# Patient Record
Sex: FEMALE | Race: WHITE | Employment: OTHER | URBAN - METROPOLITAN AREA
[De-identification: names, ages, dates, MRNs, and addresses within clinical notes are randomized per-mention and may not be internally consistent; named-entity substitution may affect disease eponyms.]

---

## 2017-01-06 ENCOUNTER — OFFICE VISIT (OUTPATIENT)
Dept: FAMILY MEDICINE CLINIC | Age: 82
End: 2017-01-06

## 2017-01-06 VITALS
WEIGHT: 171.4 LBS | HEIGHT: 66 IN | BODY MASS INDEX: 27.55 KG/M2 | OXYGEN SATURATION: 98 % | DIASTOLIC BLOOD PRESSURE: 80 MMHG | HEART RATE: 92 BPM | SYSTOLIC BLOOD PRESSURE: 120 MMHG

## 2017-01-06 DIAGNOSIS — Z00.00 WELL ADULT EXAM: Primary | ICD-10-CM

## 2017-01-06 DIAGNOSIS — E03.9 HYPOTHYROIDISM, UNSPECIFIED TYPE: ICD-10-CM

## 2017-01-06 DIAGNOSIS — H35.30 MACULAR DEGENERATION: ICD-10-CM

## 2017-01-06 DIAGNOSIS — R32 URINARY INCONTINENCE, UNSPECIFIED TYPE: ICD-10-CM

## 2017-01-06 DIAGNOSIS — M85.80 OSTEOPENIA: ICD-10-CM

## 2017-01-06 PROCEDURE — 3288F FALL RISK ASSESSMENT DOCD: CPT | Performed by: FAMILY MEDICINE

## 2017-01-06 PROCEDURE — 99397 PER PM REEVAL EST PAT 65+ YR: CPT | Performed by: FAMILY MEDICINE

## 2017-01-06 RX ORDER — LEVOTHYROXINE SODIUM 0.07 MG/1
75 TABLET ORAL DAILY
COMMUNITY
End: 2017-01-06 | Stop reason: SDUPTHER

## 2017-01-06 RX ORDER — DOCUSATE SODIUM 100 MG/1
100 CAPSULE, LIQUID FILLED ORAL DAILY
COMMUNITY

## 2017-01-06 RX ORDER — LEVOTHYROXINE SODIUM 0.07 MG/1
75 TABLET ORAL DAILY
Qty: 90 TABLET | Refills: 3 | Status: SHIPPED | OUTPATIENT
Start: 2017-01-06 | End: 2018-02-21 | Stop reason: SDUPTHER

## 2017-01-23 ENCOUNTER — HOSPITAL ENCOUNTER (OUTPATIENT)
Dept: OTHER | Age: 82
Discharge: HOME | End: 2017-01-23
Attending: FAMILY MEDICINE | Admitting: FAMILY MEDICINE

## 2017-01-23 LAB
A/G RATIO: 1.4 (ref 1.1–2.2)
ALBUMIN SERPL-MCNC: 3.7 G/DL (ref 3.4–5)
ALP BLD-CCNC: 72 U/L (ref 40–129)
ALT SERPL-CCNC: 11 U/L (ref 10–40)
ANION GAP SERPL CALCULATED.3IONS-SCNC: 14 MMOL/L (ref 3–16)
AST SERPL-CCNC: 18 U/L (ref 15–37)
BILIRUB SERPL-MCNC: 0.6 MG/DL (ref 0–1)
BUN BLDV-MCNC: 16 MG/DL (ref 7–20)
CALCIUM SERPL-MCNC: 9.1 MG/DL (ref 8.3–10.6)
CHLORIDE BLD-SCNC: 104 MMOL/L (ref 99–110)
CHOLESTEROL, TOTAL: 207 MG/DL (ref 0–199)
CO2: 24 MMOL/L (ref 21–32)
CREAT SERPL-MCNC: 0.8 MG/DL (ref 0.6–1.2)
GFR AFRICAN AMERICAN: >60
GFR NON-AFRICAN AMERICAN: >60
GLOBULIN: 2.7 G/DL
GLUCOSE BLD-MCNC: 86 MG/DL (ref 70–99)
HCT VFR BLD CALC: 40.6 % (ref 36–48)
HDLC SERPL-MCNC: 56 MG/DL (ref 40–60)
HEMOGLOBIN: 13.3 G/DL (ref 12–16)
LDL CHOLESTEROL CALCULATED: 129 MG/DL
MCH RBC QN AUTO: 28.9 PG (ref 26–34)
MCHC RBC AUTO-ENTMCNC: 32.7 G/DL (ref 31–36)
MCV RBC AUTO: 88.3 FL (ref 80–100)
PDW BLD-RTO: 14 % (ref 12.4–15.4)
PLATELET # BLD: 169 K/UL (ref 135–450)
PMV BLD AUTO: 9.3 FL (ref 5–10.5)
POTASSIUM SERPL-SCNC: 3.9 MMOL/L (ref 3.5–5.1)
RBC # BLD: 4.59 M/UL (ref 4–5.2)
SODIUM BLD-SCNC: 142 MMOL/L (ref 136–145)
TOTAL PROTEIN: 6.4 G/DL (ref 6.4–8.2)
TRIGL SERPL-MCNC: 108 MG/DL (ref 0–150)
TSH SERPL DL<=0.05 MIU/L-ACNC: 3.12 UIU/ML (ref 0.27–4.2)
VLDLC SERPL CALC-MCNC: 22 MG/DL
WBC # BLD: 4.4 K/UL (ref 4–11)

## 2017-06-07 ENCOUNTER — HOSPITAL ENCOUNTER (OUTPATIENT)
Dept: MAMMOGRAPHY | Age: 82
Discharge: HOME | End: 2017-06-07
Attending: OBSTETRICS & GYNECOLOGY | Admitting: OBSTETRICS & GYNECOLOGY

## 2017-06-07 DIAGNOSIS — Z12.31 VISIT FOR SCREENING MAMMOGRAM: ICD-10-CM

## 2018-02-13 ENCOUNTER — OFFICE VISIT (OUTPATIENT)
Dept: FAMILY MEDICINE CLINIC | Age: 83
End: 2018-02-13

## 2018-02-13 VITALS
DIASTOLIC BLOOD PRESSURE: 80 MMHG | TEMPERATURE: 97.6 F | HEART RATE: 108 BPM | SYSTOLIC BLOOD PRESSURE: 140 MMHG | OXYGEN SATURATION: 98 % | WEIGHT: 171.6 LBS | BODY MASS INDEX: 27.7 KG/M2

## 2018-02-13 DIAGNOSIS — J06.9 UPPER RESPIRATORY TRACT INFECTION, UNSPECIFIED TYPE: Primary | ICD-10-CM

## 2018-02-13 PROCEDURE — 99213 OFFICE O/P EST LOW 20 MIN: CPT | Performed by: PHYSICIAN ASSISTANT

## 2018-02-13 RX ORDER — CEPHALEXIN 500 MG/1
500 CAPSULE ORAL 3 TIMES DAILY
Qty: 30 CAPSULE | Refills: 0 | Status: SHIPPED | OUTPATIENT
Start: 2018-02-13 | End: 2019-02-21

## 2018-02-13 ASSESSMENT — ENCOUNTER SYMPTOMS
VOMITING: 0
TROUBLE SWALLOWING: 0
EYE PAIN: 0
COUGH: 1
WHEEZING: 0
SORE THROAT: 1
DIARRHEA: 0
RHINORRHEA: 1
SINUS PRESSURE: 0
SINUS PAIN: 0
SHORTNESS OF BREATH: 0
NAUSEA: 0

## 2018-02-13 NOTE — PROGRESS NOTES
visit:    Upper respiratory tract infection, unspecified type  -     cephALEXin (KEFLEX) 500 MG capsule; Take 1 capsule by mouth 3 times daily             Plan:      Treat symptomatically for a few more days and if not resolving, start keflex.

## 2018-02-21 ENCOUNTER — HOSPITAL ENCOUNTER (OUTPATIENT)
Dept: OTHER | Age: 83
Discharge: HOME | End: 2018-02-21
Attending: FAMILY MEDICINE | Admitting: FAMILY MEDICINE

## 2018-02-21 ENCOUNTER — OFFICE VISIT (OUTPATIENT)
Dept: FAMILY MEDICINE CLINIC | Age: 83
End: 2018-02-21

## 2018-02-21 VITALS
WEIGHT: 169.38 LBS | TEMPERATURE: 97.3 F | OXYGEN SATURATION: 98 % | DIASTOLIC BLOOD PRESSURE: 80 MMHG | HEART RATE: 78 BPM | BODY MASS INDEX: 27.22 KG/M2 | HEIGHT: 66 IN | SYSTOLIC BLOOD PRESSURE: 130 MMHG | RESPIRATION RATE: 12 BRPM

## 2018-02-21 DIAGNOSIS — J01.90 ACUTE BACTERIAL SINUSITIS: ICD-10-CM

## 2018-02-21 DIAGNOSIS — H35.30 MACULAR DEGENERATION: ICD-10-CM

## 2018-02-21 DIAGNOSIS — E03.9 HYPOTHYROIDISM, UNSPECIFIED TYPE: ICD-10-CM

## 2018-02-21 DIAGNOSIS — Z00.00 WELL ADULT EXAM: Primary | ICD-10-CM

## 2018-02-21 DIAGNOSIS — B96.89 ACUTE BACTERIAL SINUSITIS: ICD-10-CM

## 2018-02-21 LAB — TSH SERPL DL<=0.05 MIU/L-ACNC: 2.35 UIU/ML (ref 0.27–4.2)

## 2018-02-21 PROCEDURE — G0439 PPPS, SUBSEQ VISIT: HCPCS | Performed by: FAMILY MEDICINE

## 2018-02-21 RX ORDER — LEVOTHYROXINE SODIUM 0.07 MG/1
75 TABLET ORAL DAILY
Qty: 90 TABLET | Refills: 3 | Status: SHIPPED | OUTPATIENT
Start: 2018-02-21 | End: 2019-02-21 | Stop reason: SDUPTHER

## 2018-02-21 ASSESSMENT — PATIENT HEALTH QUESTIONNAIRE - PHQ9
SUM OF ALL RESPONSES TO PHQ QUESTIONS 1-9: 0
2. FEELING DOWN, DEPRESSED OR HOPELESS: 0
1. LITTLE INTEREST OR PLEASURE IN DOING THINGS: 0
SUM OF ALL RESPONSES TO PHQ9 QUESTIONS 1 & 2: 0

## 2018-07-02 ENCOUNTER — HOSPITAL ENCOUNTER (OUTPATIENT)
Dept: MAMMOGRAPHY | Age: 83
Discharge: HOME | End: 2018-07-02
Attending: FAMILY MEDICINE | Admitting: FAMILY MEDICINE

## 2018-07-02 DIAGNOSIS — Z12.39 BREAST CANCER SCREENING: ICD-10-CM

## 2019-02-21 ENCOUNTER — OFFICE VISIT (OUTPATIENT)
Dept: FAMILY MEDICINE CLINIC | Age: 84
End: 2019-02-21
Payer: COMMERCIAL

## 2019-02-21 VITALS
HEIGHT: 66 IN | OXYGEN SATURATION: 95 % | SYSTOLIC BLOOD PRESSURE: 136 MMHG | HEART RATE: 85 BPM | WEIGHT: 168.5 LBS | RESPIRATION RATE: 12 BRPM | DIASTOLIC BLOOD PRESSURE: 82 MMHG | BODY MASS INDEX: 27.08 KG/M2

## 2019-02-21 DIAGNOSIS — H35.30 MACULAR DEGENERATION OF BOTH EYES, UNSPECIFIED TYPE: ICD-10-CM

## 2019-02-21 DIAGNOSIS — M85.80 OSTEOPENIA, UNSPECIFIED LOCATION: ICD-10-CM

## 2019-02-21 DIAGNOSIS — E03.9 HYPOTHYROIDISM, UNSPECIFIED TYPE: ICD-10-CM

## 2019-02-21 DIAGNOSIS — Z00.00 WELL ADULT EXAM: Primary | ICD-10-CM

## 2019-02-21 PROCEDURE — G0439 PPPS, SUBSEQ VISIT: HCPCS | Performed by: FAMILY MEDICINE

## 2019-02-21 RX ORDER — LEVOTHYROXINE SODIUM 0.07 MG/1
75 TABLET ORAL DAILY
Qty: 90 TABLET | Refills: 3 | Status: SHIPPED | OUTPATIENT
Start: 2019-02-21 | End: 2020-02-26 | Stop reason: SDUPTHER

## 2019-02-21 ASSESSMENT — PATIENT HEALTH QUESTIONNAIRE - PHQ9
SUM OF ALL RESPONSES TO PHQ QUESTIONS 1-9: 0
2. FEELING DOWN, DEPRESSED OR HOPELESS: 0
SUM OF ALL RESPONSES TO PHQ QUESTIONS 1-9: 0
1. LITTLE INTEREST OR PLEASURE IN DOING THINGS: 0
SUM OF ALL RESPONSES TO PHQ9 QUESTIONS 1 & 2: 0

## 2019-03-02 ENCOUNTER — HOSPITAL ENCOUNTER (OUTPATIENT)
Age: 84
Discharge: HOME | End: 2019-03-02
Payer: COMMERCIAL

## 2019-03-02 LAB
A/G RATIO: 1.3 (ref 1.1–2.2)
ALBUMIN SERPL-MCNC: 3.9 G/DL (ref 3.4–5)
ALP BLD-CCNC: 77 U/L (ref 40–129)
ALT SERPL-CCNC: 15 U/L (ref 10–40)
ANION GAP SERPL CALCULATED.3IONS-SCNC: 12 MMOL/L (ref 3–16)
AST SERPL-CCNC: 21 U/L (ref 15–37)
BILIRUB SERPL-MCNC: 0.6 MG/DL (ref 0–1)
BUN BLDV-MCNC: 13 MG/DL (ref 7–20)
CALCIUM SERPL-MCNC: 9.3 MG/DL (ref 8.3–10.6)
CHLORIDE BLD-SCNC: 105 MMOL/L (ref 99–110)
CHOLESTEROL, TOTAL: 220 MG/DL (ref 0–199)
CO2: 26 MMOL/L (ref 21–32)
CREAT SERPL-MCNC: 0.7 MG/DL (ref 0.6–1.2)
GFR AFRICAN AMERICAN: >60
GFR NON-AFRICAN AMERICAN: >60
GLOBULIN: 3 G/DL
GLUCOSE BLD-MCNC: 99 MG/DL (ref 70–99)
HDLC SERPL-MCNC: 59 MG/DL (ref 40–60)
LDL CHOLESTEROL CALCULATED: 139 MG/DL
POTASSIUM SERPL-SCNC: 4.6 MMOL/L (ref 3.5–5.1)
SODIUM BLD-SCNC: 143 MMOL/L (ref 136–145)
TOTAL PROTEIN: 6.9 G/DL (ref 6.4–8.2)
TRIGL SERPL-MCNC: 112 MG/DL (ref 0–150)
TSH SERPL DL<=0.05 MIU/L-ACNC: 3.05 UIU/ML (ref 0.27–4.2)
VLDLC SERPL CALC-MCNC: 22 MG/DL

## 2019-03-02 PROCEDURE — 80061 LIPID PANEL: CPT

## 2019-03-02 PROCEDURE — 80053 COMPREHEN METABOLIC PANEL: CPT

## 2019-03-02 PROCEDURE — 36415 COLL VENOUS BLD VENIPUNCTURE: CPT

## 2019-03-02 PROCEDURE — 84443 ASSAY THYROID STIM HORMONE: CPT

## 2019-03-04 ENCOUNTER — TELEPHONE (OUTPATIENT)
Dept: FAMILY MEDICINE CLINIC | Age: 84
End: 2019-03-04

## 2019-08-19 ENCOUNTER — HOSPITAL ENCOUNTER (OUTPATIENT)
Dept: WOMENS IMAGING | Age: 84
Discharge: HOME | End: 2019-08-19
Payer: COMMERCIAL

## 2019-08-19 DIAGNOSIS — Z12.31 BREAST CANCER SCREENING BY MAMMOGRAM: ICD-10-CM

## 2019-08-19 PROCEDURE — 77067 SCR MAMMO BI INCL CAD: CPT

## 2020-02-26 RX ORDER — LEVOTHYROXINE SODIUM 0.07 MG/1
75 TABLET ORAL DAILY
Qty: 90 TABLET | Refills: 0 | Status: SHIPPED | OUTPATIENT
Start: 2020-02-26 | End: 2020-03-11 | Stop reason: SDUPTHER

## 2020-03-11 ENCOUNTER — OFFICE VISIT (OUTPATIENT)
Dept: FAMILY MEDICINE CLINIC | Age: 85
End: 2020-03-11
Payer: COMMERCIAL

## 2020-03-11 VITALS
HEIGHT: 66 IN | WEIGHT: 159.6 LBS | BODY MASS INDEX: 25.65 KG/M2 | DIASTOLIC BLOOD PRESSURE: 70 MMHG | OXYGEN SATURATION: 97 % | SYSTOLIC BLOOD PRESSURE: 124 MMHG | HEART RATE: 76 BPM

## 2020-03-11 PROCEDURE — G0439 PPPS, SUBSEQ VISIT: HCPCS | Performed by: FAMILY MEDICINE

## 2020-03-11 PROCEDURE — 99214 OFFICE O/P EST MOD 30 MIN: CPT | Performed by: FAMILY MEDICINE

## 2020-03-11 RX ORDER — BUSPIRONE HYDROCHLORIDE 5 MG/1
5 TABLET ORAL 3 TIMES DAILY PRN
Qty: 90 TABLET | Refills: 2 | Status: SHIPPED | OUTPATIENT
Start: 2020-03-11 | End: 2021-01-08

## 2020-03-11 RX ORDER — LEVOTHYROXINE SODIUM 0.07 MG/1
75 TABLET ORAL DAILY
Qty: 90 TABLET | Refills: 3 | Status: SHIPPED | OUTPATIENT
Start: 2020-03-11 | End: 2020-03-20 | Stop reason: SDUPTHER

## 2020-03-11 ASSESSMENT — PATIENT HEALTH QUESTIONNAIRE - PHQ9
1. LITTLE INTEREST OR PLEASURE IN DOING THINGS: 0
2. FEELING DOWN, DEPRESSED OR HOPELESS: 1
SUM OF ALL RESPONSES TO PHQ QUESTIONS 1-9: 1
SUM OF ALL RESPONSES TO PHQ QUESTIONS 1-9: 1
SUM OF ALL RESPONSES TO PHQ9 QUESTIONS 1 & 2: 1

## 2020-03-11 ASSESSMENT — LIFESTYLE VARIABLES: HOW OFTEN DO YOU HAVE A DRINK CONTAINING ALCOHOL: 0

## 2020-03-11 NOTE — PROGRESS NOTES
Medicare Annual Wellness Visit  Name: Sandra Farrell Date: 3/11/2020   MRN: [id number] Sex: Female   Age: 82 y.o. Ethnicity: Non-/Non    : 1935 Race: Chelle Gloria is here for Medicare AWV      CC: Patient resents for annual Medicare visit and follow-up of chronic health problems. S: Patient states she just has a lot of stress in her life at this time. Her sister has had progressive dementia and is requiring a lot of assistance on a daily basis. She states her and her brother were both sharing his responsibilities door brother's health decline recently. Patient recently has talked her brother and they are going to decide in the near future how to handle her sister. She herself has not been out with friends and eating as she has been in the past.  She is lost weight over the last year. She is requesting medication to use on a as needed basis for anxiety. She does continue her ophthalmology care for macular degeneration. Screenings for behavioral, psychosocial and functional/safety risks, and cognitive dysfunction are all negative except as indicated below. These results, as well as other patient data from the [facility] form, are documented in Flowsheets linked to this Encounter. No Known Allergies    Prior to Visit Medications    Medication Sig Taking?  Authorizing Provider   levothyroxine (SYNTHROID) 75 MCG tablet Take 1 tablet by mouth Daily Yes Chito Ward MD   aspirin 81 MG tablet Take 81 mg by mouth daily Yes Historical Provider, MD   docusate sodium (DULCOLAX) 100 MG capsule Take 100 mg by mouth daily Yes Historical Provider, MD   Ibuprofen-diphenhydrAMINE HCl (ADVIL PM) 200-25 MG CAPS Take 1 tablet by mouth nightly  Yes Historical Provider, MD       Past Medical History:   Diagnosis Date    Adhesive capsulitis of shoulder     Hypothyroidism     Osteoporosis, unspecified     Routine general medical examination at a health care facility normal bowel sounds, no masses or organomegaly  Extremities: no edema  Musculoskeletal: normal range of motion, no joint swelling, deformity or tenderness  Neurologic: no cranial nerve deficit, gait and coordination normal and speech normal    Patient's complete Health Risk Assessment and screening values have been reviewed and are found in Flowsheets. The following problems were reviewed today and where indicated follow up appointments were made and/or referrals ordered. Positive Risk Factor Screenings with Interventions:     General Health:  General  In general, how would you say your health is?: Very Good  In the past 7 days, have you experienced any of the following? New or Increased Pain, New or Increased Fatigue, Loneliness, Social Isolation, Stress or Anger?: (!) Stress  Do you get the social and emotional support that you need?: Yes  Do you have a Living Will?: (!) No  General Health Risk Interventions:  · Stress: patient's comments regarding reasons for stress and/or anger: Involving her sister with dementia. Patient already has a plan in place in conjunction with her brother to deal with this problem. Health Habits/Nutrition:  Health Habits/Nutrition  Do you exercise for at least 20 minutes 2-3 times per week?: Yes  Have you lost any weight without trying in the past 3 months?: (!) Yes  Do you eat fewer than 2 meals per day?: No  Have you seen a dentist within the past year?: Yes  Body mass index is 25.76 kg/m².   Health Habits/Nutrition Interventions:  · Nutritional issues:  nutritional supplements (Ensure) recommended to help prevent further weight loss    Safety:  Safety  Do you have working smoke detectors?: (!) No  Have all throw rugs been removed or fastened?: Yes  Do you have non-slip mats or surfaces in all bathtubs/showers?: Yes  Do all of your stairways have a railing or banister?: Yes  Are your doorways, halls and stairs free of clutter?: Yes  Do you always fasten your seatbelt when you are in a car?: (!) No(most of the time, sometimes no)  Safety Interventions:  · Home safety tips provided  · Patient declines any further evaluation/treatment for this issue    Personalized Preventive Plan   Current Health Maintenance Status  Immunization History   Administered Date(s) Administered    Influenza Vaccine, unspecified formulation 10/10/2016    Influenza Virus Vaccine 10/24/2007, 10/03/2014, 10/10/2016    Influenza Whole 10/24/2007, 10/03/2014    Influenza, High Dose (Fluzone 65 yrs and older) 09/29/2015, 09/21/2017, 09/21/2018, 10/16/2018, 09/24/2019    Influenza, Quadv, IM, (6 mo and older Fluzone, Flulaval, Fluarix and 3 yrs and older Afluria) 09/29/2015    PPD Test 10/24/2007    Pneumococcal Conjugate 13-valent (Prphokc80) 11/09/2016    Pneumococcal Polysaccharide ([id number]) 10/08/2009    Tdap (Boostrix, Adacel) 11/09/2016        Health Maintenance   Topic Date Due    Annual Wellness Visit (AWV)  05/29/2019    TSH testing  03/02/2020    Shingles Vaccine (1 of 2) 03/11/2021 (Originally 12/28/1987)    DTaP/Tdap/Td vaccine (2 - Td) 11/09/2026    DEXA (modify frequency per FRAX score)  Completed    Flu vaccine  Completed    Pneumococcal 65+ years Vaccine  Completed    Hepatitis A vaccine  Aged Out    Hepatitis B vaccine  Aged Out    Hib vaccine  Aged Out    Meningococcal (ACWY) vaccine  Aged Out     Recommendations for [employer] Due: see orders and patient instructions/AVS.  . Recommended screening schedule for the next 5-10 years is provided to the patient in written form: see Patient Chance Hopson was seen today for medicare awv. Diagnoses and all orders for this visit:    Routine general medical examination at a health care facility  -     Comprehensive Metabolic Panel; Future  -     Lipid Panel; Future    Hypothyroidism, unspecified type  -     TSH without Reflex; Future    Hypercholesteremia  -     Comprehensive Metabolic Panel;  Future  -     Lipid Panel; Future    Anxiety    Macular degeneration of both eyes, unspecified type    Other orders  -     levothyroxine (SYNTHROID) 75 MCG tablet; Take 1 tablet by mouth Daily  -     busPIRone (BUSPAR) 5 MG tablet; Take 1 tablet by mouth 3 times daily as needed (anxiety)        Pt appears stable & labs ordered. Adjustments of medication will be done after laboratory testing results available. In regards to her situational stress patient was prescribed BuSpar medication use on a as needed basis. I certainly encourage her to discuss with her brother long-term care plans in regards to her sister. Nutritionally she needs to start eating better and certainly encourage her to drink protein supplements. Continue with ophthalmology care for macular degeneration    Patient received counseling on the following healthy behaviors: nutrition and exercise     Patient given educational materials     Health maintenance updated    Discussed use, benefit, and side effects of prescribed medications. Barriers to medication compliance addressed. All patient questions answered. Pt voiced understanding. Patient needs RTC in one year for annual Medicare visit and RTC in 3 months if needed for the anxiety symptoms. Please note that this chart was generated using Dragon dictation software. Although every effort was made to ensure the accuracy of this automated transcription, some errors in transcription may have occurred.

## 2020-03-11 NOTE — PATIENT INSTRUCTIONS
Personalized Preventive Plan for Kalen Fail - 3/11/2020  Medicare offers a range of preventive health benefits. Some of the tests and screenings are paid in full while other may be subject to a deductible, co-insurance, and/or copay. Some of these benefits include a comprehensive review of your medical history including lifestyle, illnesses that may run in your family, and various assessments and screenings as appropriate. After reviewing your medical record and screening and assessments performed today your provider may have ordered immunizations, labs, imaging, and/or referrals for you. A list of these orders (if applicable) as well as your Preventive Care list are included within your After Visit Summary for your review. Other Preventive Recommendations:    · A preventive eye exam performed by an eye specialist is recommended every 1-2 years to screen for glaucoma; cataracts, macular degeneration, and other eye disorders. · A preventive dental visit is recommended every 6 months. · Try to get at least 150 minutes of exercise per week or 10,000 steps per day on a pedometer . · Order or download the FREE \"Exercise & Physical Activity: Your Everyday Guide\" from [employer]. Call 4-559.910.8276 or search [employer] online. · You need 2021-5573 mg of calcium and 2093-0368 IU of vitamin D per day. It is possible to meet your calcium requirement with diet alone, but a vitamin D supplement is usually necessary to meet this goal.  · When exposed to the sun, use a sunscreen that protects against both UVA and UVB radiation with an SPF of 30 or greater. Reapply every 2 to 3 hours or after sweating, drying off with a towel, or swimming. · Always wear a seat belt when traveling in a car. Always wear a helmet when riding a bicycle or motorcycle.

## 2020-03-13 PROBLEM — F41.9 ANXIETY: Status: ACTIVE | Noted: 2020-03-13

## 2020-03-13 PROBLEM — E78.00 HYPERCHOLESTEREMIA: Status: ACTIVE | Noted: 2020-03-13

## 2020-03-20 ENCOUNTER — HOSPITAL ENCOUNTER (OUTPATIENT)
Age: 85
Discharge: HOME | End: 2020-03-20
Payer: COMMERCIAL

## 2020-03-20 LAB
A/G RATIO: 1.5 (ref 1.1–2.2)
ALBUMIN SERPL-MCNC: 4 G/DL (ref 3.4–5)
ALP BLD-CCNC: 70 U/L (ref 40–129)
ALT SERPL-CCNC: 12 U/L (ref 10–40)
ANION GAP SERPL CALCULATED.3IONS-SCNC: 13 MMOL/L (ref 3–16)
AST SERPL-CCNC: 16 U/L (ref 15–37)
BILIRUB SERPL-MCNC: 0.7 MG/DL (ref 0–1)
BUN BLDV-MCNC: 15 MG/DL (ref 7–20)
CALCIUM SERPL-MCNC: 9.4 MG/DL (ref 8.3–10.6)
CHLORIDE BLD-SCNC: 107 MMOL/L (ref 99–110)
CHOLESTEROL, TOTAL: 208 MG/DL (ref 0–199)
CO2: 24 MMOL/L (ref 21–32)
CREAT SERPL-MCNC: 0.8 MG/DL (ref 0.6–1.2)
GFR AFRICAN AMERICAN: >60
GFR NON-AFRICAN AMERICAN: >60
GLOBULIN: 2.7 G/DL
GLUCOSE BLD-MCNC: 96 MG/DL (ref 70–99)
HDLC SERPL-MCNC: 59 MG/DL (ref 40–60)
LDL CHOLESTEROL CALCULATED: 127 MG/DL
POTASSIUM SERPL-SCNC: 4.1 MMOL/L (ref 3.5–5.1)
SODIUM BLD-SCNC: 144 MMOL/L (ref 136–145)
TOTAL PROTEIN: 6.7 G/DL (ref 6.4–8.2)
TRIGL SERPL-MCNC: 108 MG/DL (ref 0–150)
TSH SERPL DL<=0.05 MIU/L-ACNC: 4.34 UIU/ML (ref 0.27–4.2)
VLDLC SERPL CALC-MCNC: 22 MG/DL

## 2020-03-20 PROCEDURE — 84443 ASSAY THYROID STIM HORMONE: CPT

## 2020-03-20 PROCEDURE — 36415 COLL VENOUS BLD VENIPUNCTURE: CPT

## 2020-03-20 PROCEDURE — 80061 LIPID PANEL: CPT

## 2020-03-20 PROCEDURE — 80053 COMPREHEN METABOLIC PANEL: CPT

## 2020-03-20 RX ORDER — LEVOTHYROXINE SODIUM 0.1 MG/1
100 TABLET ORAL DAILY
Qty: 90 TABLET | Refills: 0 | Status: SHIPPED | OUTPATIENT
Start: 2020-03-20 | End: 2020-06-23 | Stop reason: SDUPTHER

## 2020-03-20 NOTE — TELEPHONE ENCOUNTER
----- Message from Luciana Barry MD sent at 3/20/2020 12:54 PM EDT -----  Increase thyroid medication to 100 mcg daily.   Recheck TSH in the next 2 months

## 2020-06-19 ENCOUNTER — HOSPITAL ENCOUNTER (OUTPATIENT)
Age: 85
Discharge: HOME | End: 2020-06-19
Payer: COMMERCIAL

## 2020-06-19 LAB — TSH SERPL DL<=0.05 MIU/L-ACNC: 1.27 UIU/ML (ref 0.27–4.2)

## 2020-06-19 PROCEDURE — 84443 ASSAY THYROID STIM HORMONE: CPT

## 2020-06-19 PROCEDURE — 36415 COLL VENOUS BLD VENIPUNCTURE: CPT

## 2020-06-23 RX ORDER — LEVOTHYROXINE SODIUM 0.1 MG/1
100 TABLET ORAL DAILY
Qty: 90 TABLET | Refills: 2 | Status: SHIPPED | OUTPATIENT
Start: 2020-06-23 | End: 2021-03-12

## 2020-06-23 NOTE — TELEPHONE ENCOUNTER
levothyroxine (SYNTHROID) 100 MCG tablet 90 tablet 0 3/20/2020     Pharmacy:  [facility] [address], [address]  [phone] - F [phone]

## 2020-09-15 ENCOUNTER — APPOINTMENT (OUTPATIENT)
Dept: GENERAL RADIOLOGY | Age: 85
End: 2020-09-15
Payer: COMMERCIAL

## 2020-09-15 ENCOUNTER — NURSE TRIAGE (OUTPATIENT)
Dept: OTHER | Facility: CLINIC | Age: 85
End: 2020-09-15

## 2020-09-15 ENCOUNTER — HOSPITAL ENCOUNTER (EMERGENCY)
Age: 85
Discharge: HOME | End: 2020-09-15
Payer: COMMERCIAL

## 2020-09-15 ENCOUNTER — APPOINTMENT (OUTPATIENT)
Dept: CT IMAGING | Age: 85
End: 2020-09-15
Payer: COMMERCIAL

## 2020-09-15 VITALS
RESPIRATION RATE: 21 BRPM | SYSTOLIC BLOOD PRESSURE: 149 MMHG | DIASTOLIC BLOOD PRESSURE: 52 MMHG | OXYGEN SATURATION: 96 % | TEMPERATURE: 98 F | WEIGHT: 160 LBS | HEART RATE: 64 BPM | HEIGHT: 67 IN | BODY MASS INDEX: 25.11 KG/M2

## 2020-09-15 PROCEDURE — 73030 X-RAY EXAM OF SHOULDER: CPT

## 2020-09-15 PROCEDURE — 99284 EMERGENCY DEPT VISIT MOD MDM: CPT

## 2020-09-15 PROCEDURE — 72125 CT NECK SPINE W/O DYE: CPT

## 2020-09-15 PROCEDURE — 6370000000 HC RX 637 (ALT 250 FOR IP)

## 2020-09-15 PROCEDURE — 70450 CT HEAD/BRAIN W/O DYE: CPT

## 2020-09-15 PROCEDURE — 6360000002 HC RX W HCPCS: Performed by: NURSE PRACTITIONER

## 2020-09-15 PROCEDURE — 12011 RPR F/E/E/N/L/M 2.5 CM/<: CPT

## 2020-09-15 PROCEDURE — 90715 TDAP VACCINE 7 YRS/> IM: CPT | Performed by: NURSE PRACTITIONER

## 2020-09-15 PROCEDURE — 90471 IMMUNIZATION ADMIN: CPT | Performed by: NURSE PRACTITIONER

## 2020-09-15 RX ADMIN — Medication 3 ML: at 13:18

## 2020-09-15 RX ADMIN — TETANUS TOXOID, REDUCED DIPHTHERIA TOXOID AND ACELLULAR PERTUSSIS VACCINE, ADSORBED 0.5 ML: 5; 2.5; 8; 8; 2.5 SUSPENSION INTRAMUSCULAR at 12:41

## 2020-09-15 ASSESSMENT — ENCOUNTER SYMPTOMS
CONSTIPATION: 0
VOMITING: 0
ABDOMINAL PAIN: 0
NAUSEA: 0
DIARRHEA: 0
RHINORRHEA: 0
SHORTNESS OF BREATH: 0
BLOOD IN STOOL: 0
SORE THROAT: 0

## 2020-09-15 NOTE — TELEPHONE ENCOUNTER
Reason for Disposition   Can't remember what happened (amnesia)    Answer Assessment - Initial Assessment Questions  1. MECHANISM: \"How did the injury happen? \" For falls, ask: \"What height did you fall from? \" and \"What surface did you fall against?\"       Pt fell down 8 steps   2. ONSET: \"When did the injury happen? \" (Minutes or hours ago)       7 am  3. NEUROLOGIC SYMPTOMS: \"Was there any loss of consciousness? \" \"Are there any other neurological symptoms? \"       *No Answer*  4. MENTAL STATUS: \"Does the person know who he is, who you are, and where he is? \"       *No Answer*  5. LOCATION: \"What part of the head was hit? \"       *No Answer*  6. SCALP APPEARANCE: \"What does the scalp look like? Is it bleeding now? \" If so, ask: \"Is it difficult to stop? \"       *No Answer*  7. SIZE: For cuts, bruises, or swelling, ask: \"How large is it? \" (e.g., inches or centimeters)       *No Answer*  8. PAIN: \"Is there any pain? \" If so, ask: \"How bad is it? \"  (e.g., Scale 1-10; or mild, moderate, severe)      *No Answer*  9. TETANUS: For any breaks in the skin, ask: \"When was the last tetanus booster? \"      *No Answer*  10. OTHER SYMPTOMS: \"Do you have any other symptoms? \" (e.g., neck pain, vomiting)        *No Answer*  11. PREGNANCY: \"Is there any chance you are pregnant? \" \"When was your last menstrual period? \"        *No Answer*    Protocols used: HEAD INJURY-ADULT-[place]    Advised pt to go to the ER for treatment and stitches.  Pt fell down 8 steps, pt has goose eye on cheek and laceration behind ear

## 2020-09-15 NOTE — ED PROVIDER NOTES
[address] [facility]        Pt Name: Warren Escobar  MRN: [id number]  [place] 1935  Date of evaluation: 9/15/2020  Provider: MARSHALL Hill - [place]  PCP: Laura Briscoe MD    This patient was not seen and evaluated by the attending physician No att. providers found. CHIEF COMPLAINT       Chief Complaint   Patient presents with    Fall     Pt. comes in today after she fell down the stairs. Pt. reports that she was carrying a box of cereal up the stairs and thinks that it was 6-8 steps. Pt. reports she is on a baby aspirin. HISTORY OF PRESENT ILLNESS   (Location/Symptom, Timing/Onset,Context/Setting, Quality, Duration, Modifying Factors, Severity)  Note limiting factors. Warren Escobar is a 82 y.o. female who presents emergency department with concern for evaluation after a fall. Patient fell down about 8 steps and has a contusion noted to the right orbit. No LOC. She reports that it was a mechanical fall. She is also concerned about a laceration around her right ear. She denies fever, rash, headaches, dizziness, chest pain, shortness of breath, cough, congestion, abdominal pain, nausea, vomiting, diarrhea, constipation, blood in the stool, or painful urination. No family at bedside. Nursing Notes triage note reviewed and agreed with or any disagreements were addressed  in the HPI. REVIEW OF SYSTEMS    (2-9 systems for level 4, 10 or more for level 5)     Review of Systems   Constitutional: Negative for chills and fever. HENT: Negative for postnasal drip, rhinorrhea and sore throat. Eyes: Negative for visual disturbance. Respiratory: Negative for shortness of breath. Cardiovascular: Negative for chest pain. Gastrointestinal: Negative for abdominal pain, blood in stool, constipation, diarrhea, nausea and vomiting. Genitourinary: Negative for dysuria, flank pain and hematuria.    Skin: Positive for wound. Negative for rash. Neurological: Negative for weakness and headaches. All other systems reviewed and are negative. Positives and Pertinent negatives as per HPI. Except as noted above in the ROS, all other systems were reviewed and negative. PAST MEDICAL HISTORY     Past Medical History:   Diagnosis Date    Adhesive capsulitis of shoulder     Hypothyroidism     Osteoporosis, unspecified     Routine general medical examination at a health care facility     Seborrheic dermatitis, unspecified     Unspecified disorder of skin and subcutaneous tissue          SURGICAL HISTORY       Past Surgical History:   Procedure Laterality Date    CATARACT REMOVAL      left    CATARACT REMOVAL      right    COLONOSCOPY      OTHER SURGICAL HISTORY      anal fissure         CURRENT MEDICATIONS       Previous Medications    ASPIRIN 81 MG TABLET    Take 81 mg by mouth daily    BUSPIRONE (BUSPAR) 5 MG TABLET    Take 1 tablet by mouth 3 times daily as needed (anxiety)    DOCUSATE SODIUM (DULCOLAX) 100 MG CAPSULE    Take 100 mg by mouth daily    IBUPROFEN-DIPHENHYDRAMINE HCL (ADVIL PM) 200-25 MG CAPS    Take 1 tablet by mouth nightly     LEVOTHYROXINE (SYNTHROID) 100 MCG TABLET    Take 1 tablet by mouth Daily         ALLERGIES     Patient has no known allergies. FAMILY HISTORY       Family History   Problem Relation Age of Onset    Hypertension Sister     Diabetes Sister     Stroke Father 76    High Blood Pressure Father     Heart Disease Mother [de-identified]    Cancer Paternal Aunt         breast          SOCIAL HISTORY       Social History     Socioeconomic History    Marital status:       Spouse name: None    Number of children: None    Years of education: None    Highest education level: None   Occupational History    None   Social Needs    Financial resource strain: None    Food insecurity     Worry: None     Inability: None    Transportation needs     Medical: None     Non-medical: None Tobacco Use    Smoking status: Never Smoker    Smokeless tobacco: Never Used   Substance and Sexual Activity    Alcohol use: Yes     Comment: occasional    Drug use: No    Sexual activity: None   Lifestyle    Physical activity     Days per week: None     Minutes per session: None    Stress: None   Relationships    Social connections     Talks on phone: None     Gets together: None     Attends Anabaptism service: None     Active member of club or organization: None     Attends meetings of clubs or organizations: None     Relationship status: None    Intimate partner violence     Fear of current or ex partner: None     Emotionally abused: None     Physically abused: None     Forced sexual activity: None   Other Topics Concern    None   Social History Narrative    None       SCREENINGS             PHYSICAL EXAM  (up to 7 for level 4, 8 or more for level 5)     ED Triage Vitals [09/15/20 1144]   BP Temp Temp Source Pulse Resp SpO2 Height Weight   (!) 179/84 98 °F (36.7 °C) Oral 88 18 96 % 5' 7\" (1.702 m) 160 lb (72.6 kg)       Physical Exam  Vitals signs and nursing note reviewed. Constitutional:       Appearance: She is well-developed. She is not diaphoretic. HENT:      Head: Normocephalic. Contusion and laceration present. Eyes:      General: No scleral icterus. Right eye: No discharge. Left eye: No discharge. Extraocular Movements: Extraocular movements intact. Right eye: Normal extraocular motion and no nystagmus. Left eye: Normal extraocular motion and no nystagmus. Pupils: Pupils are equal, round, and reactive to light. Neck:      Musculoskeletal: Full passive range of motion without pain, normal range of motion and neck supple. Normal range of motion. No neck rigidity. Meningeal: Brudzinski's sign and Kernig's sign absent. Cardiovascular:      Rate and Rhythm: Normal rate and regular rhythm. Heart sounds: Normal heart sounds. No murmur.  No friction rub. No gallop. Pulmonary:      Effort: Pulmonary effort is normal. No respiratory distress. Breath sounds: Normal breath sounds. No stridor. No wheezing or rales. Chest:      Chest wall: No tenderness. Abdominal:      General: Bowel sounds are normal. There is no distension. Palpations: Abdomen is soft. There is no mass. Tenderness: There is no abdominal tenderness. There is no guarding or rebound. Musculoskeletal: Normal range of motion. General: No tenderness. Right hand: Normal sensation noted. Normal strength noted. Left hand: Normal sensation noted. Normal strength noted. Skin:     General: Skin is warm and dry. Coloration: Skin is not pale. Neurological:      General: No focal deficit present. Mental Status: She is alert and oriented to person, place, and time. She is not disoriented. GCS: GCS eye subscore is 4. GCS verbal subscore is 5. GCS motor subscore is 6. Cranial Nerves: Cranial nerves are intact. Sensory: No sensory deficit. Motor: Motor function is intact. No abnormal muscle tone. Coordination: Coordination is intact. Coordination normal.      Deep Tendon Reflexes: Reflexes are normal and symmetric. Psychiatric:         Behavior: Behavior normal.         DIAGNOSTIC RESULTS   LABS:    Labs Reviewed - No data to display    All other labs werewithin normal range or not returned as of this dictation. EKG: All EKG's are interpreted by the Emergency Department Physician who either signs or Co-signs this chart in the absence of acardiologist.  Please see their note for interpretation of EKG. RADIOLOGY:   Interpretation per the Radiologist below, if available at the time of this note:    CT Cervical Spine WO Contrast   Final Result   No acute abnormality of the cervical spine. CT Head WO Contrast   Final Result   1. No acute intracranial abnormality.          XR SHOULDER RIGHT (MIN 2 VIEWS)   Final Result   No acute findings           No results found. PROCEDURES   Unless otherwise noted below, none     Lac Repair    Date/Time: 9/15/2020 1:53 PM  Performed by: MARSHALL Alfredo CNP  Authorized by: MARSHALL Alfredo CNP     Consent:     Consent obtained:  Verbal    Consent given by:  Patient    Risks discussed:  Infection, need for additional repair, nerve damage, poor wound healing, poor cosmetic result, pain, retained foreign body, tendon damage and vascular damage    Alternatives discussed:  No treatment and referral  Anesthesia (see MAR for exact dosages): Anesthesia method:  Topical application    Topical anesthetic:  LET  Laceration details:     Location:  Ear    Ear location:  R ear    Length (cm):  1  Repair type:     Repair type:  Complex  Pre-procedure details:     Preparation:  Imaging obtained to evaluate for foreign bodies  Exploration:     Hemostasis achieved with:  Direct pressure and LET    Wound exploration: entire depth of wound probed and visualized      Wound extent: areolar tissue violated      Wound extent: no fascia violation noted, no foreign bodies/material noted, no muscle damage noted, no nerve damage noted, no tendon damage noted, no underlying fracture noted and no vascular damage noted    Treatment:     Area cleansed with:  Hibiclens    Amount of cleaning:  Standard    Irrigation solution:  Sterile water  Skin repair:     Repair method:  Sutures    Suture size:  4-0    Suture material:  Nylon    Suture technique:  Simple interrupted    Number of sutures:  1  Approximation:     Approximation:  Close  Post-procedure details:     Dressing:  Open (no dressing)    Patient tolerance of procedure: Tolerated well, no immediate complications        CRITICAL CARE TIME     There was a high probability of life-threatening clinical deterioration in the patient's condition requiring my urgent intervention.  The total critical care time spent while evaluating and treating this patient was at least 0 minutes. This excludes time spent doing separately billable procedures. This includes time at the bedside, data interpretation, medication management, obtaining critical history from collateral sources if the patient is unable to provide it directly, and physician consultation. Specifics of interventions taken and potentially life-threatening diagnostic considerations are listed in the medical decision making. CONSULTS:  None      EMERGENCY DEPARTMENT COURSE and DIFFERENTIAL DIAGNOSIS/MDM:   Vitals:    Vitals:    09/15/20 1310 09/15/20 1330 09/15/20 1400 09/15/20 1430   BP: (!) 157/53 139/69 (!) 149/52    Pulse: 63 62 63 64   Resp: 25 22 20 21   Temp:       TempSrc:       SpO2: 99% 98% 97% 96%   Weight:       Height:           Jake Gonzalez was given the following medications:  Medications   Tetanus-Diphth-Acell Pertussis (BOOSTRIX) injection 0.5 mL (0.5 mLs Intramuscular Given 9/15/20 1241)   lidocaine-EPINEPHrine-tetracaine (LET) topical solution 3 mL syringe (3 mLs Topical Given 9/15/20 1318)       Jake Gonzalez was evaluated in the emergency department with concern for evaluation after a fall. She did fall down a couple steps and did not injure her head. No LOC. CT imaging of the head and cervical spine are negative. Laceration noted to the right ear. Repaired per the procedure note above. She tolerated this well with no immediate complications. Neurovascularly intact distal to the injury. This lowers my suspicion for nerve or vascular injury. No fracture or bony abnormality noted. In addition there is full range of motion and sensation with no tendon injury noted. I estimate there is LOW risk for COMPARTMENT SYNDROME, TENDON OR NEUROVASCULAR INJURY, OR FOREIGN BODY, thus I consider the discharge disposition reasonable. Also, there is no evidence or peritonitis, sepsis, or toxicity. Wound care instructions were provided.   My suspicion is low for serious injury including fracture, dislocation, compartment syndrome, or intracranial hemorrhage. The injury sounds consistent with mechanical fall. My suspicion is low for syncopy, arrhythmia, TIA, stroke, seizure, abuse, or PE. Merlin All is stable in the ER and safe to follow as an outpatient. The patient is discharged on the following medications. They were counseled on how to take the newly prescribed medications:  New Prescriptions    No medications on file    . Instructed to follow-up with: MD Barbara Brothers  [phone]    Schedule an appointment as soon as possible for a visit in 1 week  For a recheck, For suture removal in 7-10 days    Return to the ER for new or worsening symptoms. I evaluated the patient. The physician was available for consultation as needed. The patient and / or the family were informed of the results of any tests, a time was given to answer questions, a plan was proposed and they agreed with plan. FINAL IMPRESSION      1. Closed head injury, initial encounter    2. Fall on steps, initial encounter    3. Complex laceration of right ear, initial encounter    4.  Contusion of right orbit, initial encounter          DISPOSITION/PLAN   DISPOSITION Decision To Discharge 09/15/2020 02:20:03 PM        DISCONTINUED MEDICATIONS:  Discontinued Medications    No medications on file                (Please note that portions of this note were completed with a voice recognition program.  Efforts were made to edit the dictations but occasionally words are mis-transcribed.)    Cecille Lefort, APRN - CNP (electronically signed)        Cecille Lefort, APRN - CNP  09/15/20 MARSHALL Jimenez CNP  10/06/20 1143

## 2020-09-21 ENCOUNTER — OFFICE VISIT (OUTPATIENT)
Dept: FAMILY MEDICINE CLINIC | Age: 85
End: 2020-09-21
Payer: COMMERCIAL

## 2020-09-21 VITALS
OXYGEN SATURATION: 98 % | RESPIRATION RATE: 12 BRPM | BODY MASS INDEX: 26.02 KG/M2 | SYSTOLIC BLOOD PRESSURE: 126 MMHG | TEMPERATURE: 97.1 F | HEART RATE: 79 BPM | DIASTOLIC BLOOD PRESSURE: 70 MMHG | WEIGHT: 166.13 LBS

## 2020-09-21 PROCEDURE — 99214 OFFICE O/P EST MOD 30 MIN: CPT | Performed by: FAMILY MEDICINE

## 2020-09-21 NOTE — PROGRESS NOTES
Subjective:      Patient ID: Ana Fuentes is a 82 y.o. female. CC: Patient presents emergency room follow-up    HPI pt is here for a ER follow up. Pt was seen there on 9/15 due to a fall. Patient states she was simply walking up her basement steps carrying some cereal and suddenly she slipped and fell down 8 steps. She did not have any loss of consciousness. Pt has a laceration to her right ear and needs stiches removed. In the emergency room she had a CT scan done of her head and neck x-rays which did not show any fractures. Pt states she is feeling much better. Patient is not even take any Tylenol for discomfort. Review of Systems  Patient Active Problem List   Diagnosis    Hypothyroidism    Osteopenia    Urinary incontinence    Macular degeneration    Hypercholesteremia    Anxiety       Outpatient Medications Marked as Taking for the 9/21/20 encounter (Office Visit) with Marianna Leventhal, MD   Medication Sig Dispense Refill    levothyroxine (SYNTHROID) 100 MCG tablet Take 1 tablet by mouth Daily 90 tablet 2    busPIRone (BUSPAR) 5 MG tablet Take 1 tablet by mouth 3 times daily as needed (anxiety) 90 tablet 2    aspirin 81 MG tablet Take 81 mg by mouth daily      docusate sodium (DULCOLAX) 100 MG capsule Take 100 mg by mouth daily      Ibuprofen-diphenhydrAMINE HCl (ADVIL PM) 200-25 MG CAPS Take 1 tablet by mouth nightly          No Known Allergies    Social History     Tobacco Use    Smoking status: Never Smoker    Smokeless tobacco: Never Used   Substance Use Topics    Alcohol use: Yes     Comment: occasional       /70 (Site: Left Upper Arm, Position: Sitting, Cuff Size: Medium Adult)   Pulse 79   Temp 97.1 °F (36.2 °C) (Tympanic)   Resp 12   Wt 166 lb 2 oz (75.4 kg)   SpO2 98%   BMI 26.02 kg/m²     Objective:   Physical Exam  Vitals signs and nursing note reviewed. Constitutional:       General: She is not in acute distress. Appearance: She is well-developed.    HENT: Head: Laceration (Above the right ear with a laceration and one suture in place) present. No raccoon eyes. Jaw: There is normal jaw occlusion. No tenderness, pain on movement or malocclusion. Right Ear: Tympanic membrane and ear canal normal.      Left Ear: Tympanic membrane and ear canal normal.      Mouth/Throat:      Mouth: Mucous membranes are moist.      Pharynx: Oropharynx is clear. Neck:      Musculoskeletal: Normal range of motion. Musculoskeletal:      Left hand: She exhibits normal range of motion, no tenderness and no swelling. Lymphadenopathy:      Cervical: No cervical adenopathy. Skin:     General: Skin is warm. Findings: Bruising and laceration present. No rash. Comments: Laceration of the right face as noted above. Left index finger with a superficial laceration/abrasion on the dorsal aspect   Neurological:      General: No focal deficit present. Mental Status: She is alert and oriented to person, place, and time. Cranial Nerves: No cranial nerve deficit. Motor: No weakness. Gait: Gait normal.   Psychiatric:         Behavior: Behavior is cooperative. Assessment:      Max Gipson was seen today for follow-up from hospital.    Diagnoses and all orders for this visit:    Laceration of right ear, initial encounter    Contusion of face, initial encounter    Abrasion of left index finger, initial encounter            Plan:      Hospital information reviewed with patient      Sutures removed from the right ear    Continue with symptomatic treatment    Watch for signs of infection    RTC at regular point in time    Please note that this chart was generated using Dragon dictation software. Although every effort was made to ensure the accuracy of this automated transcription, some errors in transcription may have occurred.

## 2020-10-02 ENCOUNTER — TELEPHONE (OUTPATIENT)
Dept: FAMILY MEDICINE CLINIC | Age: 85
End: 2020-10-02

## 2020-10-02 NOTE — TELEPHONE ENCOUNTER
----- Message from Jose Goldman sent at 10/2/2020  9:27 AM EDT -----  Subject: Exposure to Contagious Disease    QUESTIONS  Which Disease is the patient concerned about? Other - Covid  When was the patient exposed? 2020-09-21  Where was the patient exposed? Other - Emergency room   daughter was with her  Is the patient experiencing any symptoms? No  Has there been recent travel? Yes  If Yes   where? To the emergency room  Additional Information for Provider? Was in the emergency room because she   had fallen two weeks ago. Daughter was with her and has since tested   positive for Covid and was told yesterday.   ---------------------------------------------------------------------------  --------------  CALL BACK INFO  What is the best way for the office to contact you? OK to leave message on   voicemail  Preferred Call Back Phone Number?  [phone]

## 2020-11-23 ENCOUNTER — OFFICE VISIT (OUTPATIENT)
Dept: PRIMARY CARE CLINIC | Age: 85
End: 2020-11-23
Payer: COMMERCIAL

## 2020-11-23 ENCOUNTER — TELEPHONE (OUTPATIENT)
Dept: FAMILY MEDICINE CLINIC | Age: 85
End: 2020-11-23

## 2020-11-23 PROCEDURE — 99211 OFF/OP EST MAY X REQ PHY/QHP: CPT | Performed by: NURSE PRACTITIONER

## 2020-11-23 NOTE — PATIENT INSTRUCTIONS

## 2020-11-23 NOTE — TELEPHONE ENCOUNTER
Patient's cough started yesterday but thinks it is from her using Clorox spray from cleaning. She doesn't think she has been exposed to covid but her daughter told her to call Dr Catracho Cheng. Patient's Daughter had covid 3 weeks ago (she was not around her) but she has been around her granddaughter who had it weeks ago and just finished her quarantine. 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       Please advise

## 2020-11-23 NOTE — PROGRESS NOTES
Corinna Philippe received a viral test for COVID-19. They were educated on isolation and quarantine as appropriate. For any symptoms, they were directed to seek care from their PCP, given contact information to establish with a doctor, directed to an urgent care or the emergency room.

## 2020-11-24 LAB — SARS-COV-2, NAA: DETECTED

## 2020-11-25 ENCOUNTER — TELEPHONE (OUTPATIENT)
Dept: FAMILY MEDICINE CLINIC | Age: 85
End: 2020-11-25

## 2020-11-25 NOTE — RESULT ENCOUNTER NOTE
Your test came back detected/positive for Covid-19. What happens if I have a positive test?  If you have symptoms:  Isolate until all three of these things are true: 1) your symptoms are better, 2) it has been 10 days since you first felt sick, and 3) you have had no fever for at least 24 hours without using medicine that lowers fever. Drink plenty of fluids and eat when you can. You may take medicine for pain or fever if you need to. Rest as much as you can. If you do not have symptoms:  Stay home for 10 days after the date you were tested. If you develop symptoms during those 10 days, stay home until all three of these things are true: 1) your symptoms are better, 2) it has been 10 days since you first felt sick, and 3) you have had no fever for at least 24 hours without using medicine that lowers fever. Follow care instructions from your doctor or other healthcare provider. Seek emergency medical care immediately if you have trouble breathing, persistent pain or pressure in the chest, new confusion, inability to wake or stay awake, or bluish lips or face. Someone from the health department (case investigator or contact tracer) may reach out to you to check on your health and ask about other people you have been around or where you've spent time while you may have been able to spread COVID-19 to others. This person's role is strictly to map the virus to help identify people who may have been exposed to the virus and prevent its spread. The local health department will also provide guidance on how to stay safely at home to avoid spreading illness. Detected results can happen for months and you are not considered contagious. No retest is necessary.

## 2020-12-22 ENCOUNTER — OFFICE VISIT (OUTPATIENT)
Dept: FAMILY MEDICINE CLINIC | Age: 85
End: 2020-12-22
Payer: COMMERCIAL

## 2020-12-22 ENCOUNTER — APPOINTMENT (OUTPATIENT)
Dept: GENERAL RADIOLOGY | Age: 85
DRG: 177 | End: 2020-12-22
Payer: COMMERCIAL

## 2020-12-22 ENCOUNTER — NURSE TRIAGE (OUTPATIENT)
Dept: OTHER | Facility: CLINIC | Age: 85
End: 2020-12-22

## 2020-12-22 ENCOUNTER — APPOINTMENT (OUTPATIENT)
Dept: CT IMAGING | Age: 85
DRG: 177 | End: 2020-12-22
Payer: COMMERCIAL

## 2020-12-22 ENCOUNTER — HOSPITAL ENCOUNTER (INPATIENT)
Age: 85
LOS: 9 days | Discharge: HOME HEALTH | DRG: 177 | End: 2020-12-31
Attending: EMERGENCY MEDICINE | Admitting: INTERNAL MEDICINE
Payer: COMMERCIAL

## 2020-12-22 VITALS — HEART RATE: 98 BPM | OXYGEN SATURATION: 92 % | TEMPERATURE: 97 F

## 2020-12-22 DIAGNOSIS — J96.01 ACUTE RESPIRATORY FAILURE WITH HYPOXIA (HCC): ICD-10-CM

## 2020-12-22 DIAGNOSIS — J12.82 PNEUMONIA DUE TO COVID-19 VIRUS: Primary | ICD-10-CM

## 2020-12-22 DIAGNOSIS — U07.1 PNEUMONIA DUE TO COVID-19 VIRUS: Primary | ICD-10-CM

## 2020-12-22 PROBLEM — R06.02 SHORTNESS OF BREATH: Status: ACTIVE | Noted: 2020-12-22

## 2020-12-22 LAB
A/G RATIO: 1 (ref 1.1–2.2)
ALBUMIN SERPL-MCNC: 3.5 G/DL (ref 3.4–5)
ALP BLD-CCNC: 80 U/L (ref 40–129)
ALT SERPL-CCNC: 7 U/L (ref 10–40)
ANION GAP SERPL CALCULATED.3IONS-SCNC: 10 MMOL/L (ref 3–16)
AST SERPL-CCNC: 18 U/L (ref 15–37)
BASOPHILS ABSOLUTE: 0 K/UL (ref 0–0.2)
BASOPHILS RELATIVE PERCENT: 0.6 %
BILIRUB SERPL-MCNC: 0.5 MG/DL (ref 0–1)
BUN BLDV-MCNC: 13 MG/DL (ref 7–20)
CALCIUM SERPL-MCNC: 9.6 MG/DL (ref 8.3–10.6)
CHLORIDE BLD-SCNC: 100 MMOL/L (ref 99–110)
CO2: 25 MMOL/L (ref 21–32)
CREAT SERPL-MCNC: 0.7 MG/DL (ref 0.6–1.2)
EOSINOPHILS ABSOLUTE: 0.2 K/UL (ref 0–0.6)
EOSINOPHILS RELATIVE PERCENT: 3.4 %
GFR AFRICAN AMERICAN: >60
GFR NON-AFRICAN AMERICAN: >60
GLOBULIN: 3.6 G/DL
GLUCOSE BLD-MCNC: 130 MG/DL (ref 70–99)
HCT VFR BLD CALC: 40.1 % (ref 36–48)
HEMOGLOBIN: 13.1 G/DL (ref 12–16)
LYMPHOCYTES ABSOLUTE: 0.7 K/UL (ref 1–5.1)
LYMPHOCYTES RELATIVE PERCENT: 12.1 %
MCH RBC QN AUTO: 29.3 PG (ref 26–34)
MCHC RBC AUTO-ENTMCNC: 32.7 G/DL (ref 31–36)
MCV RBC AUTO: 89.4 FL (ref 80–100)
MONOCYTES ABSOLUTE: 0.6 K/UL (ref 0–1.3)
MONOCYTES RELATIVE PERCENT: 9.2 %
NEUTROPHILS ABSOLUTE: 4.5 K/UL (ref 1.7–7.7)
NEUTROPHILS RELATIVE PERCENT: 74.7 %
PDW BLD-RTO: 14.1 % (ref 12.4–15.4)
PLATELET # BLD: 213 K/UL (ref 135–450)
PMV BLD AUTO: 8.3 FL (ref 5–10.5)
POTASSIUM SERPL-SCNC: 4.4 MMOL/L (ref 3.5–5.1)
PRO-BNP: 477 PG/ML (ref 0–449)
RBC # BLD: 4.49 M/UL (ref 4–5.2)
SODIUM BLD-SCNC: 135 MMOL/L (ref 136–145)
TOTAL PROTEIN: 7.1 G/DL (ref 6.4–8.2)
TROPONIN: <0.01 NG/ML
WBC # BLD: 6 K/UL (ref 4–11)

## 2020-12-22 PROCEDURE — 36415 COLL VENOUS BLD VENIPUNCTURE: CPT

## 2020-12-22 PROCEDURE — 6360000004 HC RX CONTRAST MEDICATION: Performed by: INTERNAL MEDICINE

## 2020-12-22 PROCEDURE — 80053 COMPREHEN METABOLIC PANEL: CPT

## 2020-12-22 PROCEDURE — U0003 INFECTIOUS AGENT DETECTION BY NUCLEIC ACID (DNA OR RNA); SEVERE ACUTE RESPIRATORY SYNDROME CORONAVIRUS 2 (SARS-COV-2) (CORONAVIRUS DISEASE [COVID-19]), AMPLIFIED PROBE TECHNIQUE, MAKING USE OF HIGH THROUGHPUT TECHNOLOGIES AS DESCRIBED BY CMS-2020-01-R: HCPCS

## 2020-12-22 PROCEDURE — 71045 X-RAY EXAM CHEST 1 VIEW: CPT

## 2020-12-22 PROCEDURE — 6360000002 HC RX W HCPCS: Performed by: PHYSICIAN ASSISTANT

## 2020-12-22 PROCEDURE — 83880 ASSAY OF NATRIURETIC PEPTIDE: CPT

## 2020-12-22 PROCEDURE — 85025 COMPLETE CBC W/AUTO DIFF WBC: CPT

## 2020-12-22 PROCEDURE — 71260 CT THORAX DX C+: CPT

## 2020-12-22 PROCEDURE — 99283 EMERGENCY DEPT VISIT LOW MDM: CPT

## 2020-12-22 PROCEDURE — 2580000003 HC RX 258: Performed by: PHYSICIAN ASSISTANT

## 2020-12-22 PROCEDURE — 99213 OFFICE O/P EST LOW 20 MIN: CPT | Performed by: FAMILY MEDICINE

## 2020-12-22 PROCEDURE — 84484 ASSAY OF TROPONIN QUANT: CPT

## 2020-12-22 PROCEDURE — 1200000000 HC SEMI PRIVATE

## 2020-12-22 PROCEDURE — 87040 BLOOD CULTURE FOR BACTERIA: CPT

## 2020-12-22 PROCEDURE — 93005 ELECTROCARDIOGRAM TRACING: CPT | Performed by: EMERGENCY MEDICINE

## 2020-12-22 RX ORDER — DEXAMETHASONE SODIUM PHOSPHATE 10 MG/ML
6 INJECTION, SOLUTION INTRAMUSCULAR; INTRAVENOUS ONCE
Status: DISCONTINUED | OUTPATIENT
Start: 2020-12-22 | End: 2020-12-22

## 2020-12-22 RX ADMIN — IOPAMIDOL 75 ML: 755 INJECTION, SOLUTION INTRAVENOUS at 22:40

## 2020-12-22 RX ADMIN — AZITHROMYCIN MONOHYDRATE 500 MG: 500 INJECTION, POWDER, LYOPHILIZED, FOR SOLUTION INTRAVENOUS at 22:27

## 2020-12-22 RX ADMIN — Medication 1 G: at 22:26

## 2020-12-22 ASSESSMENT — ENCOUNTER SYMPTOMS
DIARRHEA: 0
WHEEZING: 0
COUGH: 1
VOMITING: 0
ABDOMINAL PAIN: 0
NAUSEA: 0
RHINORRHEA: 0
EYES NEGATIVE: 1
GASTROINTESTINAL NEGATIVE: 1
SHORTNESS OF BREATH: 1

## 2020-12-22 NOTE — PROGRESS NOTES
2020  [address] [facility] [place] (:  1935)    Allergies: No Known Allergies  (review in Epic)      FLU/RESPIRATORY/COVID-19 CLINIC EVALUATION    HPI:   Chief Complaint   Patient presents with    Cough    Shortness of Breath        SYMPTOMS:    INSTRUCTIONS:  \"[x]\" Indicates a positive item  \"[]\" Indicates a negative item      [] Denies Fever, Cough, SOB, Loss of Taste or Smell, Body Aches, Diarrhea      Symptom duration, days:  [] 1   [] 2   [] 3   [] 4 - 7   [] 8 - 10   [] 11 - 13   [x] >14    [] Fevers    [] Symptom (not measured)  [] Measured (Result:  degrees)  [] Chills  [x] Cough [x] Dry [] Productive - when takes deep breath   []Loss of Taste  [] Loss of Smell  []Decreased Appetite  [] Coughing up blood  }  [] Chest Congestion  [] Nasal Congestion  [] Runny  Nose  [] Sneezing  [x] Feeling short of breath   [x]Sometimes (with activity)    [] Frequently    [] All the time     [] Chest pain     [] Headaches  []Tolerable  [] Severe     [x] Fatigue  [] Sore throat  [x] Muscle aches  [] Nausea  [] Vomiting  []Unable to keep fluids down     [] Diarrhea  [] Mild  []Severe         [] OTHER SYMPTOMS: Dx with Covid in the week of . Started to feel better, but last week she started feeling worse. No ear pain or sore throat. Symptom course:   [x] Worsening     [] Stable     [] Improving      RISK FACTORS:1INSTRUCTIONS:  \"[x]\" Indicates a positive item. Negative  for risk factors if not checked.     [] Close contact with a lab confirmed COVID-19 patient within 14 days of symptom onset  [] History of travel from affected geographical areas within 14 days of symptom onset        PHYSICAL EXAMINATION:    Vitals:    20 1547   Pulse: 98   Temp: 97 °F (36.1 °C)   TempSrc: Tympanic   SpO2: 92%      Sats 90-92%    [x] Alert  [x] Oriented to person/place/time    [x] No apparent distress   [] Toxic appearing  [] Face flushed appearing     [x] Normal Mood  [] Anxious appearing      [] Sclera clear [x] Pinna, TMs,  Canals normal bilaterally  [] TM Red  [] Right [] Left [] Bilateral  [] TM Bulging [] Right [] Left []  Billateral    [x] Oropharynx [x] Clear [] Red [] Exudate [] Swollen    [x] No adenopathy [] Adenopathy __________    [x] Lungs clear with good movement and effort  [x] Breathing appears normal     [x] Speaks in complete sentences  [] Appears tachypneic   [] Wheezing           [] Rhonchi   [] Decreased    [] CV RRR  [x] No Murmur  [] Murmur  [] Irregular  [x] Tachycardic    [] OTHER:  1}      TESTS ORDERED:    [] POCT FLU  [] POCT STREP  [] COVID-19 Test sent  [] Appointment made at testing clinic for patient to get a COVID test.       TEST RESULTS:    POCT FLU test:  [] Positive  [] Negative  POCT STREP test:  [] Positive  [] Negative    ASSESSMENT:  [] Allergic Rhinitis  [] Asthma Exacerbation  [] Bronchitis  [] COPD Exacerbation  [] Gastroenteritis  [] Influenza  [] Sinusitis  [] Strep Throat [] Sore Throat  [] Viral URI   [] Possible COVID-19   [] Exposure to COVID -19  [x] Positive for COVID  [] Screening for Viral Disease (COVID test no sx)        Olive was seen today for cough and shortness of breath. Diagnoses and all orders for this visit:    COVID-19    Other orders  -     Droplet Plus Isolation - (Use only for COVID-19);  Standing  -     Droplet Plus Isolation - (Use only for COVID-19)                PLAN:    [x] Discharge home with written instructions for:  [] Flu management  [] Strep throat management  [] Viral respiratory illness management  [] Sinusitis management  [] Bronchitis Management  [] Possible COVID-19 infection with self-quarantine and management of symptoms  [] Follow-up with primary care physician or emergency department if worsens  [x] Referred to emergency department for evaluation Scribe attestation: Abel Ta ([facility]), am scribing for and in the presence of Marilou Devries MD. Electronically signed by Leonardo Gutierres ([address] [place] [address]) on 12/22/20 at 3:51 PM EST         Portions of Note per  BARRY Durham with corrections and edits per Marilou Devries MD.  I agree with entirety of note and was present and performed history and physical.  I also confirm that the note above accurately reflects all work, treatment, procedures, and medical decision making performed by me, Marilou Devries MD

## 2020-12-23 ENCOUNTER — TELEPHONE (OUTPATIENT)
Dept: FAMILY MEDICINE CLINIC | Age: 85
End: 2020-12-23

## 2020-12-23 LAB
ALBUMIN SERPL-MCNC: 3.5 G/DL (ref 3.4–5)
ANION GAP SERPL CALCULATED.3IONS-SCNC: 10 MMOL/L (ref 3–16)
BASOPHILS ABSOLUTE: 0 K/UL (ref 0–0.2)
BASOPHILS RELATIVE PERCENT: 0.4 %
BUN BLDV-MCNC: 10 MG/DL (ref 7–20)
CALCIUM SERPL-MCNC: 9.4 MG/DL (ref 8.3–10.6)
CHLORIDE BLD-SCNC: 104 MMOL/L (ref 99–110)
CO2: 24 MMOL/L (ref 21–32)
CREAT SERPL-MCNC: 0.5 MG/DL (ref 0.6–1.2)
D DIMER: 994 NG/ML DDU (ref 0–229)
EKG ATRIAL RATE: 99 BPM
EKG DIAGNOSIS: NORMAL
EKG P-R INTERVAL: 154 MS
EKG Q-T INTERVAL: 332 MS
EKG QRS DURATION: 72 MS
EKG QTC CALCULATION (BAZETT): 426 MS
EKG R AXIS: -7 DEGREES
EKG T AXIS: 45 DEGREES
EKG VENTRICULAR RATE: 99 BPM
EOSINOPHILS ABSOLUTE: 0 K/UL (ref 0–0.6)
EOSINOPHILS RELATIVE PERCENT: 0.2 %
GFR AFRICAN AMERICAN: >60
GFR NON-AFRICAN AMERICAN: >60
GLUCOSE BLD-MCNC: 158 MG/DL (ref 70–99)
HCT VFR BLD CALC: 40.1 % (ref 36–48)
HEMOGLOBIN: 13.1 G/DL (ref 12–16)
LYMPHOCYTES ABSOLUTE: 0.6 K/UL (ref 1–5.1)
LYMPHOCYTES RELATIVE PERCENT: 15.2 %
MAGNESIUM: 2.1 MG/DL (ref 1.8–2.4)
MCH RBC QN AUTO: 28.8 PG (ref 26–34)
MCHC RBC AUTO-ENTMCNC: 32.7 G/DL (ref 31–36)
MCV RBC AUTO: 88.2 FL (ref 80–100)
MONOCYTES ABSOLUTE: 0.2 K/UL (ref 0–1.3)
MONOCYTES RELATIVE PERCENT: 4.4 %
NEUTROPHILS ABSOLUTE: 3.4 K/UL (ref 1.7–7.7)
NEUTROPHILS RELATIVE PERCENT: 79.8 %
PDW BLD-RTO: 14.1 % (ref 12.4–15.4)
PHOSPHORUS: 3.7 MG/DL (ref 2.5–4.9)
PLATELET # BLD: 233 K/UL (ref 135–450)
PMV BLD AUTO: 8.3 FL (ref 5–10.5)
POTASSIUM SERPL-SCNC: 4.5 MMOL/L (ref 3.5–5.1)
PROCALCITONIN: 0.07 NG/ML (ref 0–0.15)
PROCALCITONIN: 0.07 NG/ML (ref 0–0.15)
RBC # BLD: 4.54 M/UL (ref 4–5.2)
SARS-COV-2, PCR: DETECTED
SODIUM BLD-SCNC: 138 MMOL/L (ref 136–145)
WBC # BLD: 4.2 K/UL (ref 4–11)

## 2020-12-23 PROCEDURE — 85379 FIBRIN DEGRADATION QUANT: CPT

## 2020-12-23 PROCEDURE — 80069 RENAL FUNCTION PANEL: CPT

## 2020-12-23 PROCEDURE — 6370000000 HC RX 637 (ALT 250 FOR IP): Performed by: INTERNAL MEDICINE

## 2020-12-23 PROCEDURE — 83735 ASSAY OF MAGNESIUM: CPT

## 2020-12-23 PROCEDURE — 6360000002 HC RX W HCPCS: Performed by: INTERNAL MEDICINE

## 2020-12-23 PROCEDURE — 1200000000 HC SEMI PRIVATE

## 2020-12-23 PROCEDURE — 94761 N-INVAS EAR/PLS OXIMETRY MLT: CPT

## 2020-12-23 PROCEDURE — 87449 NOS EACH ORGANISM AG IA: CPT

## 2020-12-23 PROCEDURE — 84145 PROCALCITONIN (PCT): CPT

## 2020-12-23 PROCEDURE — 93010 ELECTROCARDIOGRAM REPORT: CPT | Performed by: INTERNAL MEDICINE

## 2020-12-23 PROCEDURE — 92526 ORAL FUNCTION THERAPY: CPT

## 2020-12-23 PROCEDURE — 92610 EVALUATE SWALLOWING FUNCTION: CPT

## 2020-12-23 PROCEDURE — 36415 COLL VENOUS BLD VENIPUNCTURE: CPT

## 2020-12-23 PROCEDURE — 2700000000 HC OXYGEN THERAPY PER DAY

## 2020-12-23 PROCEDURE — 2580000003 HC RX 258: Performed by: INTERNAL MEDICINE

## 2020-12-23 PROCEDURE — 85025 COMPLETE CBC W/AUTO DIFF WBC: CPT

## 2020-12-23 RX ORDER — LEVOTHYROXINE SODIUM 0.1 MG/1
100 TABLET ORAL DAILY
Status: DISCONTINUED | OUTPATIENT
Start: 2020-12-23 | End: 2020-12-31 | Stop reason: HOSPADM

## 2020-12-23 RX ORDER — BUSPIRONE HYDROCHLORIDE 5 MG/1
5 TABLET ORAL 3 TIMES DAILY PRN
Status: DISCONTINUED | OUTPATIENT
Start: 2020-12-23 | End: 2020-12-31 | Stop reason: HOSPADM

## 2020-12-23 RX ORDER — ALBUTEROL SULFATE 90 UG/1
2 AEROSOL, METERED RESPIRATORY (INHALATION) EVERY 6 HOURS PRN
Status: DISCONTINUED | OUTPATIENT
Start: 2020-12-23 | End: 2020-12-31 | Stop reason: HOSPADM

## 2020-12-23 RX ORDER — DEXAMETHASONE SODIUM PHOSPHATE 10 MG/ML
6 INJECTION, SOLUTION INTRAMUSCULAR; INTRAVENOUS EVERY 24 HOURS
Status: DISCONTINUED | OUTPATIENT
Start: 2020-12-23 | End: 2020-12-25

## 2020-12-23 RX ORDER — POLYETHYLENE GLYCOL 3350 17 G/17G
17 POWDER, FOR SOLUTION ORAL DAILY PRN
Status: DISCONTINUED | OUTPATIENT
Start: 2020-12-23 | End: 2020-12-31 | Stop reason: HOSPADM

## 2020-12-23 RX ORDER — SODIUM CHLORIDE 0.9 % (FLUSH) 0.9 %
10 SYRINGE (ML) INJECTION EVERY 12 HOURS SCHEDULED
Status: DISCONTINUED | OUTPATIENT
Start: 2020-12-23 | End: 2020-12-31 | Stop reason: HOSPADM

## 2020-12-23 RX ORDER — PROMETHAZINE HYDROCHLORIDE 25 MG/1
12.5 TABLET ORAL EVERY 6 HOURS PRN
Status: DISCONTINUED | OUTPATIENT
Start: 2020-12-23 | End: 2020-12-31 | Stop reason: HOSPADM

## 2020-12-23 RX ORDER — ACETAMINOPHEN 650 MG/1
650 SUPPOSITORY RECTAL EVERY 6 HOURS PRN
Status: DISCONTINUED | OUTPATIENT
Start: 2020-12-23 | End: 2020-12-31 | Stop reason: HOSPADM

## 2020-12-23 RX ORDER — SODIUM CHLORIDE 0.9 % (FLUSH) 0.9 %
10 SYRINGE (ML) INJECTION PRN
Status: DISCONTINUED | OUTPATIENT
Start: 2020-12-23 | End: 2020-12-31 | Stop reason: HOSPADM

## 2020-12-23 RX ORDER — ONDANSETRON 2 MG/ML
4 INJECTION INTRAMUSCULAR; INTRAVENOUS EVERY 6 HOURS PRN
Status: DISCONTINUED | OUTPATIENT
Start: 2020-12-23 | End: 2020-12-31 | Stop reason: HOSPADM

## 2020-12-23 RX ORDER — DOCUSATE SODIUM 100 MG/1
100 CAPSULE, LIQUID FILLED ORAL DAILY
Status: DISCONTINUED | OUTPATIENT
Start: 2020-12-23 | End: 2020-12-31 | Stop reason: HOSPADM

## 2020-12-23 RX ORDER — ACETAMINOPHEN 325 MG/1
650 TABLET ORAL EVERY 6 HOURS PRN
Status: DISCONTINUED | OUTPATIENT
Start: 2020-12-23 | End: 2020-12-31 | Stop reason: HOSPADM

## 2020-12-23 RX ADMIN — Medication 1 G: at 21:29

## 2020-12-23 RX ADMIN — Medication 10 ML: at 10:25

## 2020-12-23 RX ADMIN — ENOXAPARIN SODIUM 30 MG: 40 INJECTION SUBCUTANEOUS at 21:29

## 2020-12-23 RX ADMIN — DEXAMETHASONE SODIUM PHOSPHATE 6 MG: 10 INJECTION, SOLUTION INTRAMUSCULAR; INTRAVENOUS at 10:07

## 2020-12-23 RX ADMIN — DOCUSATE SODIUM 100 MG: 100 CAPSULE ORAL at 08:52

## 2020-12-23 RX ADMIN — Medication 10 ML: at 21:40

## 2020-12-23 RX ADMIN — LEVOTHYROXINE SODIUM 100 MCG: 0.1 TABLET ORAL at 06:19

## 2020-12-23 RX ADMIN — AZITHROMYCIN MONOHYDRATE 500 MG: 500 INJECTION, POWDER, LYOPHILIZED, FOR SOLUTION INTRAVENOUS at 21:36

## 2020-12-23 RX ADMIN — ENOXAPARIN SODIUM 30 MG: 40 INJECTION SUBCUTANEOUS at 08:53

## 2020-12-23 NOTE — PROGRESS NOTES
4 Eyes Skin Assessment     The patient is being assess for  Admission    I agree that 2 RN's have performed a thorough Head to Toe Skin Assessment on the patient. ALL assessment sites listed below have been assessed. Areas assessed by both nurses:   [x]   Head, Face, and Ears   [x]   Shoulders, Back, and Chest  [x]   Arms, Elbows, and Hands   [x]   Coccyx, Sacrum, and IschIum  [x]   Legs, Feet, and Heels        Does the Patient have Skin Breakdown?   No         Star Prevention initiated:  Yes   Wound Care Orders initiated:  NA      [facility] nurse consulted for Pressure Injury (Stage 3,4, Unstageable, DTI, NWPT, and Complex wounds), New and Established Ostomies:  NA      Nurse 1 eSignature: Electronically signed by Kavin Vazquez RN on 12/23/20 at 5:55 AM EST    **SHARE this note so that the co-signing nurse is able to place an eSignature**    Nurse 2 eSignature: Electronically signed by Ronit Parker on 12/23/20 at 5:58 AM EST

## 2020-12-23 NOTE — ED NOTES
Bed: 15  Expected date:   Expected time:   Means of arrival:   Comments:  otilio Dove, RN  12/22/20 2105

## 2020-12-23 NOTE — PROGRESS NOTES
Pt positioned upright in bed upon arrival.  Pt agreeable to various texture trials to evaluate overall swallow function. Pt exhibits mild oropharyngeal dysphagia characterized by prolonged, but effective mastication and slightly delayed swallow initiation. Thin liquids tolerated with no overt clinical s/s of aspiration/penetration. Regular textures revealed prolonged, but eventually effective mastication with adequate oral clearance due to nature of dentition. Pt with improve mastication timing with soft solids. Recommend downgrade to a Dysphagia III Soft & Bite-Sized vs dental soft due to nature of dentition with thin liquids at this time. Pt verbalized understanding and agreement. Dietary Recommendations:  Dysphagia III Soft & Bite-Sized with thin liquids; Meds as tolerated    Strategies: 90 degree positioning with all p.o. intake; small bites/sips; alternate textures through meal; reduce rate of intake    Treatment/Goals: Speech therapy for dysphagia tx 1-2x follow-up to ensure diet tolerance    ST.) Pt will tolerate recommended diet without s/s of aspiration   2.) If clinical symptoms of penetration/aspiration continue to be noted,Pt will tolerate MBS to r/o aspiration and determine appropriate diet/liquid level.      Oral motor Exam:  Dentition: Upper natural/lower dentures; pt not wearing dentures on bottom during assessment as she does not wear them to eat at home  Labial/Facial: grossly WFL  Lingual: grossly WFL  Voice: grossly WFL    Oral Phase:   Prolonged/Reduced mastication  Reduced A-P propulsion  Apparent premature bolus loss to pharynx    Pharyngeal Phase:  Apparent pharyngeal pooling  Delayed swallow initiation      Patient/Family Education:Education, results and recommendations given to the Pt and nurse, who verbalized understanding    Timed Code Treatment:0 minutes    Total Treatment Time:23 minutes Discharge Recommendations: Speech Therapy for Speech/Dysphagia treatment at discharge. If patient discharges prior to next session this note will serve as a discharge summary. Signature:     Tushar PIÑA  CCC-SLP S.P. [id number]  Speech-Language Pathologist

## 2020-12-23 NOTE — H&P
HOSPITALISTS HISTORY AND PHYSICAL    2020 10:22 PM    Patient Information:  Reed Rueda is a 82 y.o. female [id number]  PCP:  Rocio Omalley MD (Tel: [phone] )    Chief complaint:    Chief Complaint   Patient presents with    Shortness of Breath     SENT IN BY MD FOR LOW 02. 84% ON RA DURING TRIAGE. HAD COVID IN NOVEMBER. THINKS SISTER  FROM IT. 100% ON 2L VIA NASAL CANNULA        History of Present Illness: Barbara Bernal is a 82 y.o. female presents to ER for body aches and shortness of breath. She states started feeling ill a couple days prior to [place] with body aches, sore throat, cough, and shortness of breath. She tested positive for COVID at that time. After about 10 days she started feeling better and went to stay with her sister who was ill. She cared for her sister for about 2 weeks while sister was on Hospice. Her sister passed away last week and patient reports the day after her sister  she started feeling ill again. She reports she felt weak and was short of breath. She denies any cough or fevers. She is experiencing chest discomfort with deep inspiration. She saw her primary physician today and was sent to ER because SpO2 were 84% on RA. History obtained from patient       REVIEW OF SYSTEMS:   Review of Systems   Constitutional: Positive for activity change and fatigue. Negative for fever. HENT: Negative. Eyes: Negative. Respiratory: Positive for cough and shortness of breath. Cardiovascular: Negative. Gastrointestinal: Negative. Endocrine: Negative. Genitourinary: Negative. Musculoskeletal: Positive for myalgias. Skin: Negative. Neurological: Negative. Hematological: Negative. Psychiatric/Behavioral: Negative.         Past Medical History: has a past medical history of Adhesive capsulitis of shoulder, Hypothyroidism, Osteoporosis, unspecified, Routine general medical examination at a health care facility, Seborrheic dermatitis, unspecified, and Unspecified disorder of skin and subcutaneous tissue. Past Surgical History:   has a past surgical history that includes Cataract removal; Cataract removal; other surgical history; and Colonoscopy. Medications:  No current facility-administered medications on file prior to encounter. Current Outpatient Medications on File Prior to Encounter   Medication Sig Dispense Refill    levothyroxine (SYNTHROID) 100 MCG tablet Take 1 tablet by mouth Daily 90 tablet 2    busPIRone (BUSPAR) 5 MG tablet Take 1 tablet by mouth 3 times daily as needed (anxiety) 90 tablet 2    aspirin 81 MG tablet Take 81 mg by mouth daily      docusate sodium (DULCOLAX) 100 MG capsule Take 100 mg by mouth daily      Ibuprofen-diphenhydrAMINE HCl (ADVIL PM) 200-25 MG CAPS Take 1 tablet by mouth nightly          Allergies:  No Known Allergies     Social History:  Patient Lives alone   reports that she has never smoked. She has never used smokeless tobacco. She reports current alcohol use. She reports that she does not use drugs. Family History:  family history includes Cancer in her paternal aunt; Diabetes in her sister; Heart Disease (age of onset: [de-identified]) in her mother; High Blood Pressure in her father; Hypertension in her sister; Stroke (age of onset: 76) in her father. ,     Physical Exam:  BP (!) 146/66   Pulse 100   Temp 97.1 °F (36.2 °C) (Temporal)   Resp 20   Ht 5' 7\" (1.702 m)   Wt 160 lb (72.6 kg)   SpO2 100%   BMI 25.06 kg/m²   Physical Exam  Vitals signs and nursing note reviewed. Constitutional:       Appearance: Normal appearance. She is not ill-appearing. HENT:      Head: Normocephalic and atraumatic.       Nose: Nose normal.      Mouth/Throat:      Mouth: Mucous membranes are moist.   Eyes: Pupils: Pupils are equal, round, and reactive to light. Neck:      Musculoskeletal: Normal range of motion. Cardiovascular:      Rate and Rhythm: Normal rate and regular rhythm. Pulses: Normal pulses. Heart sounds: No murmur. Pulmonary:      Effort: Pulmonary effort is normal. No respiratory distress. Breath sounds: Wheezing and rales present. Abdominal:      General: Abdomen is flat. Bowel sounds are normal. There is no distension. Palpations: Abdomen is soft. Tenderness: There is no abdominal tenderness. Musculoskeletal: Normal range of motion. Right lower leg: No edema. Left lower leg: No edema. Skin:     General: Skin is warm and dry. Neurological:      General: No focal deficit present. Mental Status: She is alert and oriented to person, place, and time. Gait: Gait normal.   Psychiatric:         Mood and Affect: Mood normal.         Behavior: Behavior normal.         Thought Content: Thought content normal.         Judgment: Judgment normal.         Labs:  CBC:   Lab Results   Component Value Date    WBC 6.0 12/22/2020    RBC 4.49 12/22/2020    HGB 13.1 12/22/2020    HCT 40.1 12/22/2020    MCV 89.4 12/22/2020    MCH 29.3 12/22/2020    MCHC 32.7 12/22/2020    RDW 14.1 12/22/2020     12/22/2020    MPV 8.3 12/22/2020     BMP:    Lab Results   Component Value Date     12/22/2020    K 4.4 12/22/2020     12/22/2020    CO2 25 12/22/2020    BUN 13 12/22/2020    CREATININE 0.7 12/22/2020    CALCIUM 9.6 12/22/2020    GFRAA >60 12/22/2020    GFRAA >60 11/20/2012    LABGLOM >60 12/22/2020    GLUCOSE 130 12/22/2020     XR CHEST PORTABLE   Final Result   Patchy areas of airspace disease within both lungs, most compatible with   pneumonia. Given the features, atypical pneumonia would be favored.          CT CHEST PULMONARY EMBOLISM W CONTRAST    (Results Pending)     Chest Xray:   EKG:    I visualized CXR images and EKG strips    Problem List Active Problems:    Shortness of breath  Resolved Problems:    * No resolved hospital problems. *        Assessment/Plan:   1. Acute Respiratory Failure with Hypoxia secondary to Pneumonia   Pt will be admitted to medical floor, continue oxygen. Will give IV antibiotics. Suspect she recovered from [place] and now has bacterial pneumonia. Check  pro calcitonin. CTA of chest given hypoxia and pleuritic pain. Blood cultures,    Oxygen,   Urine strep and legionella. Recheck labs in am.     2. Hypothyroid   Continue home meds        DVT prophylaxis Lovenox  Code status Full   Diet Regular   IV access peripheral     Admit as inpatient. I anticipate hospitalization spanning more than two midnights for investigation and treatment of the above medically necessary diagnoses. Please note that some part of this chart was generated using Dragon dictation software. Although every effort was made to ensure the accuracy of this automated transcription, some errors in transcription may have occurred inadvertently. If you may need any clarification, please do not hesitate to contact me through [facility].        MARSHALL Quinn CNP    12/22/2020 10:22 PM

## 2020-12-23 NOTE — ACP (ADVANCE CARE PLANNING)
Advanced Care Planning Note.     Purpose of Encounter: Advanced care planning in light of hospitalization  Parties In Attendance: Patient,    Decisional Capacity: Yes  Subjective: Patient  understand that this conversation is to address long term care goal  Objective: o2 1.5l  Goals of Care Determination: Patient would pursue CPR intubation PEG tube and dialysis if required no tracheostomy or long-term ventilation  Code Status: full code  Time spent on Advanced care Plannin minutes  Advanced Care Planning Documents: documented patient's wishes, would like daughter Paolo Muhammad to make medication decisions if unable to make decisions    Luis Manuel Sheets MD  2020 11:00 AM

## 2020-12-23 NOTE — ED NOTES
Report called to 5T, RN verbalized understanding and denied any need for further information, patient visible on tele monitor on unit, patient to be transported to unit at this time      Arnie Mccrary RN  12/23/20 0101

## 2020-12-23 NOTE — ED PROVIDER NOTES
As physician-in-triage, I performed a medical screening history and physical exam on [facility]. I also cared for and evaluated the patient in conjunction with the ED Advanced Practice Provider. All diagnostic, treatment, and disposition decisions were made by myself in conjunction with the advanced practice provider. For all further details of the patient's emergency department visit, please see the advanced practice provider's documentation. Patient presents the ER for evaluation of underlying hypoxia she is afebrile, she was COVID-19 diagnosis on 20. Her sister just  of COVID-23 within the last week. She had marked increase in dyspnea and cough in the last 48 hours. She is afebrile, she has no hemoptysis positive increasing hypoxia and shortness of breath. No hematemesis. Normal mental status. Chest x-ray shows bilateral infiltrates, patient's white count is normal she still has lymphopenia she will undergo CT of the chest to rule out PE procalcitonin is pending. CT of the chest will be performed to rule out PE. Differential diagnosis includes protracted COVID-19 course versus bacterial pneumonia subacute. Positive hypoxia. Supplemental oxygen and treatment proceeding noted disease.         Impression: COVID-19, hypoxia, pneumonia     Analilia Vaughan MD   2514

## 2020-12-23 NOTE — PROGRESS NOTES
[facility]    12/23/2020 10:54 AM        Name: Matt Dennis .               Admitted: 12/22/2020  Primary Care Provider: Beulah Guerra MD (Tel: [phone])            Subjective:    Patient seen in bed feels better than yesterday no chest pain no nausea no vomiting    Reviewed interval ancillary notes    Current Medications      busPIRone (BUSPAR) tablet 5 mg, TID PRN      docusate sodium (COLACE) capsule 100 mg, Daily      levothyroxine (SYNTHROID) tablet 100 mcg, Daily      sodium chloride flush 0.9 % injection 10 mL, 2 times per day      sodium chloride flush 0.9 % injection 10 mL, PRN      promethazine (PHENERGAN) tablet 12.5 mg, Q6H PRN    Or      ondansetron (ZOFRAN) injection 4 mg, Q6H PRN      polyethylene glycol (GLYCOLAX) packet 17 g, Daily PRN      acetaminophen (TYLENOL) tablet 650 mg, Q6H PRN    Or      acetaminophen (TYLENOL) suppository 650 mg, Q6H PRN      azithromycin (ZITHROMAX) 500 mg in D5W 250ml Vial Mate, Q24H    And      cefTRIAXone (ROCEPHIN) 1 g in sterile water 10 mL IV syringe, Q24H      albuterol sulfate  (90 Base) MCG/ACT inhaler 2 puff, Q6H PRN      enoxaparin (LOVENOX) injection 30 mg, BID      dexamethasone (PF) (DECADRON) injection 6 mg, Q24H        Objective:  /79   Pulse 77   Temp 97.6 °F (36.4 °C) (Oral)   Resp 18   Ht 5' 7\" (1.702 m)   Wt 149 lb 3.2 oz (67.7 kg)   SpO2 94%   BMI 23.37 kg/m²     Intake/Output Summary (Last 24 hours) at 12/23/2020 1054  Last data filed at 12/23/2020 0855  Gross per 24 hour   Intake 460 ml   Output 680 ml   Net -220 ml      Wt Readings from Last 3 Encounters:   12/23/20 149 lb 3.2 oz (67.7 kg)   09/21/20 166 lb 2 oz (75.4 kg)   09/15/20 160 lb (72.6 kg)       Patient examined from window as covid 19 pna  aaox3 not tachypnic  No accesory muscle usage able to talk in full sentances no gross focal deficts Labs and Tests:  CBC:   Recent Labs     12/22/20  1644 12/23/20  0701   WBC 6.0 4.2   HGB 13.1 13.1    233     BMP:    Recent Labs     12/22/20  1644 12/23/20  0701   * 138   K 4.4 4.5    104   CO2 25 24   BUN 13 10   CREATININE 0.7 0.5*   GLUCOSE 130* 158*     Hepatic:   Recent Labs     12/22/20  1644   AST 18   ALT 7*   BILITOT 0.5   ALKPHOS 80     CT CHEST PULMONARY EMBOLISM W CONTRAST   Final Result   No evidence of pulmonary embolism. Scattered areas of patchy and ground-glass opacities are noted throughout the   bilateral lungs. These are nonspecific but could indicate an atypical/viral   pneumonia, inclusive of COVID-19. Mild anterior compression of the T5 vertebral body with about 25% anterior   height loss, this is age indeterminate but is probably chronic in nature. Small hiatal hernia. XR CHEST PORTABLE   Final Result   Patchy areas of airspace disease within both lungs, most compatible with   pneumonia. Given the features, atypical pneumonia would be favored. Recent imaging reviewed    Problem List  Active Problems:    Shortness of breath  Resolved Problems:    * No resolved hospital problems.  *       Assessment & Plan:   Acute Hypoxic respiratory failure secondary to COVID-19 pneumonia and possible bacterial pneumonia  -procalcitonin, cultures pending, continue atbx  - continue decadron  - wean o2 as toelrated    Hypothyroidism: home meds    Diet: DIET GENERAL;  Code:Full Code  DVT PPXlovenox  Disposition home hopefully in 2-3 days      Hector Duarte MD   12/23/2020 10:54 AM

## 2020-12-23 NOTE — CARE COORDINATION
Chart reviewed for d/c planning. Pt is from home and alert and oriented. Had Covid in November, Covid test still pending at this time. Pt currently on 1.5 Liters of oxygen. No needs anticipated at this time. CM will monitor for d/c planning.     Yeyo Gamez RN, BSN  [phone]

## 2020-12-23 NOTE — ED PROVIDER NOTES
[facility]        Pt Name: Negro Devries  MRN: [id number]  [place] 1935  Date of evaluation: 2020  Provider: Ryan Heredia PA-C  PCP: Brittni Sanchez MD     I have seen and evaluated this patient with my supervising physician Oneyda Youssef MD.    [facility]       Chief Complaint   Patient presents with    Shortness of Breath     SENT IN BY MD FOR LOW 02. 84% ON RA DURING TRIAGE. HAD COVID IN NOVEMBER. THINKS SISTER  FROM IT. 100% ON 2L VIA NASAL CANNULA       HISTORY OF PRESENT ILLNESS   (Location, Timing/Onset, Context/Setting, Quality, Duration, Modifying Factors, Severity, Associated Signs and Symptoms)  Note limiting factors. Negro Devries is a 82 y.o. female with recent diagnosis of COVID-19 the Tuesday before  presents for evaluation of increasing symptoms including generalized weakness, fatigue and shortness of breath. Patient has no history of CHF, COPD or prior oxygen requirement. She states that symptoms started to get worse about 7 days ago. She denies fevers or chills. States the cough seems to have improved. She denies abdominal pain nausea vomiting or diarrhea. No urinary complaints. States that her Covid has gone through her entire family and her sister just passed away last week from this. She states that everybody else seems to be getting better but she has not is concerned for this. She has no other complaints or concerns at this time. Nursing Notes were all reviewed and agreed with or any disagreements were addressed in the HPI. REVIEW OF SYSTEMS    (2-9 systems for level 4, 10 or more for level 5)     Review of Systems   Constitutional: Positive for fatigue. Negative for appetite change, chills and fever. HENT: Negative for congestion and rhinorrhea. Respiratory: Positive for cough and shortness of breath. Negative for wheezing. Cardiovascular: Negative for chest pain. Gastrointestinal: Negative for abdominal pain, diarrhea, nausea and vomiting. Genitourinary: Negative for difficulty urinating, dysuria and hematuria. Musculoskeletal: Negative for neck pain and neck stiffness. Skin: Negative for rash. Neurological: Positive for weakness. Negative for headaches. Positives and Pertinent negatives as per HPI. Except as noted above in the ROS, all other systems were reviewed and negative. PAST MEDICAL HISTORY     Past Medical History:   Diagnosis Date    Adhesive capsulitis of shoulder     Hypothyroidism     Osteoporosis, unspecified     Routine general medical examination at a health care facility     Seborrheic dermatitis, unspecified     Unspecified disorder of skin and subcutaneous tissue          SURGICAL HISTORY     Past Surgical History:   Procedure Laterality Date    CATARACT REMOVAL      left    CATARACT REMOVAL      right    COLONOSCOPY      OTHER SURGICAL HISTORY      anal fissure         CURRENTMEDICATIONS       Previous Medications    ASPIRIN 81 MG TABLET    Take 81 mg by mouth daily    BUSPIRONE (BUSPAR) 5 MG TABLET    Take 1 tablet by mouth 3 times daily as needed (anxiety)    DOCUSATE SODIUM (DULCOLAX) 100 MG CAPSULE    Take 100 mg by mouth daily    IBUPROFEN-DIPHENHYDRAMINE HCL (ADVIL PM) 200-25 MG CAPS    Take 1 tablet by mouth nightly     LEVOTHYROXINE (SYNTHROID) 100 MCG TABLET    Take 1 tablet by mouth Daily         ALLERGIES     Patient has no known allergies.     FAMILYHISTORY       Family History   Problem Relation Age of Onset    Hypertension Sister     Diabetes Sister     Stroke Father 76    High Blood Pressure Father     Heart Disease Mother [de-identified]    Cancer Paternal Aunt         breast          SOCIAL HISTORY       Social History     Tobacco Use    Smoking status: Never Smoker    Smokeless tobacco: Never Used   Substance Use Topics    Alcohol use: Yes     Comment: occasional  Drug use: No       SCREENINGS             PHYSICAL EXAM    (up to 7 for level 4, 8 or more for level 5)     ED Triage Vitals   BP Temp Temp Source Pulse Resp SpO2 Height Weight   12/22/20 1608 12/22/20 1608 12/22/20 1608 12/22/20 1608 12/22/20 1608 12/22/20 1607 12/22/20 1608 12/22/20 1608   (!) 146/66 97.1 °F (36.2 °C) Temporal 100 20 (!) 84 % 5' 7\" (1.702 m) 160 lb (72.6 kg)       Physical Exam  Vitals signs and nursing note reviewed. Constitutional:       General: She is not in acute distress. Appearance: She is well-developed. She is not ill-appearing, toxic-appearing or diaphoretic. HENT:      Head: Normocephalic and atraumatic. Right Ear: External ear normal.      Left Ear: External ear normal.      Nose: Nose normal.      Mouth/Throat:      Mouth: Mucous membranes are moist.      Pharynx: Oropharynx is clear. No oropharyngeal exudate or posterior oropharyngeal erythema. Eyes:      General:         Right eye: No discharge. Left eye: No discharge. Extraocular Movements: Extraocular movements intact. Conjunctiva/sclera: Conjunctivae normal.      Pupils: Pupils are equal, round, and reactive to light. Neck:      Musculoskeletal: Normal range of motion and neck supple. Cardiovascular:      Rate and Rhythm: Normal rate and regular rhythm. Heart sounds: Normal heart sounds. Pulmonary:      Effort: Pulmonary effort is normal. No respiratory distress. Breath sounds: Normal breath sounds. Chest:      Chest wall: No tenderness. Abdominal:      General: There is no distension. Palpations: Abdomen is soft. Tenderness: There is no abdominal tenderness. Musculoskeletal: Normal range of motion. Skin:     General: Skin is warm and dry. Neurological:      Mental Status: She is alert and oriented to person, place, and time.    Psychiatric:         Behavior: Behavior normal.         DIAGNOSTIC RESULTS   LABS:    Labs Reviewed Result Date: 2020  EXAMINATION: ONE XRAY VIEW OF THE CHEST 2020 4:09 pm COMPARISON: None. HISTORY: ORDERING SYSTEM PROVIDED HISTORY: SOB TECHNOLOGIST PROVIDED HISTORY: Reason for exam:->SOB Reason for Exam: Shortness of Breath (SENT IN BY MD FOR LOW 02. 84% ON RA DURING TRIAGE. HAD COVID IN NOVEMBER. THINKS SISTER  FROM IT. 100% ON 2L VIA NASAL CANNULA) Acuity: Unknown Type of Exam: Unknown FINDINGS: Patchy areas of airspace disease are identified within both lungs, with both an interstitial and alveolar quality, seen in a peribronchovascular distribution, as well as within the periphery of the lungs. The cardial pericardial silhouette is unremarkable in appearance. No pneumothorax is seen. No free air. No acute bony abnormality. Patchy areas of airspace disease within both lungs, most compatible with pneumonia. Given the features, atypical pneumonia would be favored.            PROCEDURES   Unless otherwise noted below, none     Procedures    CRITICAL CARE TIME   N/A    CONSULTS:  IP CONSULT TO HOSPITALIST      EMERGENCY DEPARTMENT COURSE and DIFFERENTIAL DIAGNOSIS/MDM:   Vitals:    Vitals:    20 1607 20 1608   BP:  (!) 146/66   Pulse:  100   Resp:  20   Temp:  97.1 °F (36.2 °C)   TempSrc:  Temporal   SpO2: (!) 84% 100%   Weight:  160 lb (72.6 kg)   Height:  5' 7\" (1.702 m)       Patient was given the following medications:  Medications   dexamethasone (PF) (DECADRON) injection 6 mg (has no administration in time range)   azithromycin (ZITHROMAX) 500 mg in D5W 250ml Vial Mate (has no administration in time range)   cefTRIAXone (ROCEPHIN) 1 g in sterile water 10 mL IV syringe (has no administration in time range) Patient presents for evaluation of weakness and shortness of breath with recent diagnosis of Covid. On exam, she is resting comfortably in bed in no acute distress and nontoxic. She is hypoxic 84% on room air, placed on 2 L nasal cannula oxygen. Vitals otherwise stable and she is afebrile. Lungs are pretty clear with no obvious wheezing rales or rhonchi. Chest is nontender and abdomen is benign. Please see attending note for EKG interpretation. CBC and CMP are unremarkable. No leukocytosis. She is lymphopenic. Troponin is negative. . Chest x-ray shows patchy areas of airspace disease compatible with pneumonia. She was given dose of Decadron as well as first dose of azithromycin and Rocephin in the ED. procalcitonin and D-dimer pending. Patient be admitted for further evaluation and management of bilateral pneumonia likely secondary to COVID-19 though cannot rule out bacterial, no process at this time. She has new oxygen requirement. Will defer anticoagulation to hospitalist.  He will reason for the patient at this time. Patient informed and agreeable. She is stable for admission. Critical Care  There was a high probability of life-threatening clinical deterioration in the patient's condition requiring my urgent intervention. Total critical care time with the patient was 35 minutes excluding separately reportable procedures. Critical care required due to patients presentation, comorbidities and concern for acute life-threatening disease process, respiratory failure with hypoxia, Covid and decompensation. FINAL IMPRESSION      1. Pneumonia due to COVID-19 virus    2. Acute respiratory failure with hypoxia [facility])          DISPOSITION/PLAN   DISPOSITION Decision To Admit 12/22/2020 09:39:36 PM      PATIENT REFERREDTO:  No follow-up provider specified.     DISCHARGE MEDICATIONS:  New Prescriptions    No medications on file       DISCONTINUED MEDICATIONS: Discontinued Medications    No medications on file              (Please note that portions of this note were completed with a voice recognition program.  Efforts were made to edit the dictations but occasionally words are mis-transcribed.)    Kg Short PA-C (electronically signed)           [place], [place]  12/22/20 8618

## 2020-12-24 LAB
ALBUMIN SERPL-MCNC: 3.3 G/DL (ref 3.4–5)
ANION GAP SERPL CALCULATED.3IONS-SCNC: 11 MMOL/L (ref 3–16)
BASOPHILS ABSOLUTE: 0 K/UL (ref 0–0.2)
BASOPHILS RELATIVE PERCENT: 0.4 %
BUN BLDV-MCNC: 15 MG/DL (ref 7–20)
CALCIUM SERPL-MCNC: 9.4 MG/DL (ref 8.3–10.6)
CHLORIDE BLD-SCNC: 105 MMOL/L (ref 99–110)
CO2: 22 MMOL/L (ref 21–32)
CREAT SERPL-MCNC: 0.5 MG/DL (ref 0.6–1.2)
EOSINOPHILS ABSOLUTE: 0.1 K/UL (ref 0–0.6)
EOSINOPHILS RELATIVE PERCENT: 0.8 %
GFR AFRICAN AMERICAN: >60
GFR NON-AFRICAN AMERICAN: >60
GLUCOSE BLD-MCNC: 118 MG/DL (ref 70–99)
HCT VFR BLD CALC: 40.6 % (ref 36–48)
HEMOGLOBIN: 13.3 G/DL (ref 12–16)
L. PNEUMOPHILA SEROGP 1 UR AG: NORMAL
LYMPHOCYTES ABSOLUTE: 1.4 K/UL (ref 1–5.1)
LYMPHOCYTES RELATIVE PERCENT: 16.4 %
MAGNESIUM: 2.2 MG/DL (ref 1.8–2.4)
MCH RBC QN AUTO: 29.2 PG (ref 26–34)
MCHC RBC AUTO-ENTMCNC: 32.8 G/DL (ref 31–36)
MCV RBC AUTO: 88.9 FL (ref 80–100)
MONOCYTES ABSOLUTE: 0.8 K/UL (ref 0–1.3)
MONOCYTES RELATIVE PERCENT: 8.7 %
NEUTROPHILS ABSOLUTE: 6.5 K/UL (ref 1.7–7.7)
NEUTROPHILS RELATIVE PERCENT: 73.7 %
PDW BLD-RTO: 14.1 % (ref 12.4–15.4)
PHOSPHORUS: 3.1 MG/DL (ref 2.5–4.9)
PLATELET # BLD: 254 K/UL (ref 135–450)
PMV BLD AUTO: 8.4 FL (ref 5–10.5)
POTASSIUM SERPL-SCNC: 4 MMOL/L (ref 3.5–5.1)
RBC # BLD: 4.57 M/UL (ref 4–5.2)
SODIUM BLD-SCNC: 138 MMOL/L (ref 136–145)
STREP PNEUMONIAE ANTIGEN, URINE: NORMAL
WBC # BLD: 8.8 K/UL (ref 4–11)

## 2020-12-24 PROCEDURE — 2580000003 HC RX 258: Performed by: INTERNAL MEDICINE

## 2020-12-24 PROCEDURE — 93005 ELECTROCARDIOGRAM TRACING: CPT | Performed by: INTERNAL MEDICINE

## 2020-12-24 PROCEDURE — 80069 RENAL FUNCTION PANEL: CPT

## 2020-12-24 PROCEDURE — 1200000000 HC SEMI PRIVATE

## 2020-12-24 PROCEDURE — 94761 N-INVAS EAR/PLS OXIMETRY MLT: CPT

## 2020-12-24 PROCEDURE — 6370000000 HC RX 637 (ALT 250 FOR IP): Performed by: INTERNAL MEDICINE

## 2020-12-24 PROCEDURE — 36415 COLL VENOUS BLD VENIPUNCTURE: CPT

## 2020-12-24 PROCEDURE — 83735 ASSAY OF MAGNESIUM: CPT

## 2020-12-24 PROCEDURE — 85025 COMPLETE CBC W/AUTO DIFF WBC: CPT

## 2020-12-24 PROCEDURE — 6360000002 HC RX W HCPCS: Performed by: INTERNAL MEDICINE

## 2020-12-24 RX ADMIN — ENOXAPARIN SODIUM 30 MG: 40 INJECTION SUBCUTANEOUS at 21:30

## 2020-12-24 RX ADMIN — Medication 10 ML: at 21:31

## 2020-12-24 RX ADMIN — ENOXAPARIN SODIUM 30 MG: 40 INJECTION SUBCUTANEOUS at 08:08

## 2020-12-24 RX ADMIN — Medication 10 ML: at 08:09

## 2020-12-24 RX ADMIN — DOCUSATE SODIUM 100 MG: 100 CAPSULE ORAL at 08:08

## 2020-12-24 RX ADMIN — LEVOTHYROXINE SODIUM 100 MCG: 0.1 TABLET ORAL at 05:54

## 2020-12-24 RX ADMIN — DEXAMETHASONE SODIUM PHOSPHATE 6 MG: 10 INJECTION, SOLUTION INTRAMUSCULAR; INTRAVENOUS at 08:08

## 2020-12-24 ASSESSMENT — PAIN SCALES - GENERAL
PAINLEVEL_OUTOF10: 0
PAINLEVEL_OUTOF10: 0

## 2020-12-24 NOTE — PROGRESS NOTES
[facility]    12/24/2020 9:44 AM        Name: Alden Jimenez .               Admitted: 12/22/2020  Primary Care Provider: Cristi Sands MD (Tel: [phone])            Subjective:    Patient lying in bed states shortness of breath about the same drops down to about 85% on room air denies any chest pain nausea vomiting    Reviewed interval ancillary notes    Current Medications      busPIRone (BUSPAR) tablet 5 mg, TID PRN      docusate sodium (COLACE) capsule 100 mg, Daily      levothyroxine (SYNTHROID) tablet 100 mcg, Daily      sodium chloride flush 0.9 % injection 10 mL, 2 times per day      sodium chloride flush 0.9 % injection 10 mL, PRN      promethazine (PHENERGAN) tablet 12.5 mg, Q6H PRN    Or      ondansetron (ZOFRAN) injection 4 mg, Q6H PRN      polyethylene glycol (GLYCOLAX) packet 17 g, Daily PRN      acetaminophen (TYLENOL) tablet 650 mg, Q6H PRN    Or      acetaminophen (TYLENOL) suppository 650 mg, Q6H PRN      albuterol sulfate  (90 Base) MCG/ACT inhaler 2 puff, Q6H PRN      enoxaparin (LOVENOX) injection 30 mg, BID      dexamethasone (PF) (DECADRON) injection 6 mg, Q24H        Objective:  BP (!) 148/90   Pulse 82   Temp 97.6 °F (36.4 °C) (Oral)   Resp 18   Ht 5' 7\" (1.702 m)   Wt 149 lb 3.2 oz (67.7 kg)   SpO2 (!) 84%   BMI 23.37 kg/m²     Intake/Output Summary (Last 24 hours) at 12/24/2020 0944  Last data filed at 12/24/2020 0084  Gross per 24 hour   Intake 640 ml   Output    Net 640 ml      Wt Readings from Last 3 Encounters:   12/23/20 149 lb 3.2 oz (67.7 kg)   09/21/20 166 lb 2 oz (75.4 kg)   09/15/20 160 lb (72.6 kg)       Patient examined from window as covid 19 pna  aaox3 not tachypnic  No accesory muscle usage able to talk in full sentances no gross focal deficts      Labs and Tests:  CBC:   Recent Labs     12/22/20  1644 12/23/20  0701 12/24/20  8481 WBC 6.0 4.2 8.8   HGB 13.1 13.1 13.3    233 254     BMP:    Recent Labs     12/22/20  1644 12/23/20  0701 12/24/20  0508   * 138 138   K 4.4 4.5 4.0    104 105   CO2 25 24 22   BUN 13 10 15   CREATININE 0.7 0.5* 0.5*   GLUCOSE 130* 158* 118*     Hepatic:   Recent Labs     12/22/20  1644   AST 18   ALT 7*   BILITOT 0.5   ALKPHOS 80     CT CHEST PULMONARY EMBOLISM W CONTRAST   Final Result   No evidence of pulmonary embolism. Scattered areas of patchy and ground-glass opacities are noted throughout the   bilateral lungs. These are nonspecific but could indicate an atypical/viral   pneumonia, inclusive of COVID-19. Mild anterior compression of the T5 vertebral body with about 25% anterior   height loss, this is age indeterminate but is probably chronic in nature. Small hiatal hernia. XR CHEST PORTABLE   Final Result   Patchy areas of airspace disease within both lungs, most compatible with   pneumonia. Given the features, atypical pneumonia would be favored. Recent imaging reviewed    Problem List  Active Problems:    Shortness of breath  Resolved Problems:    * No resolved hospital problems. *       Assessment & Plan:   Acute Hypoxic respiratory failure secondary to COVID-19 pneumonia and possible bacterial pneumonia  -Calcitonin normal culture results were negative except for one blood culture with gram-positive cocci likely be contaminated given no fever normal white blood cell count we will hold off on further antibiotics for now follow-up culture  - continue decadron  - wean o2 as toelrated    Hypothyroidism: home meds    Diet: DIET DYSPHAGIA SOFT AND BITE-SIZED;   Code:Full Code  DVT PPXlovenox  Disposition home hopefully in 2-3 days      Negro Britt MD   12/24/2020 9:44 AM

## 2020-12-24 NOTE — PROGRESS NOTES
Message sent to hosp.   FYI; Microbiology called with this result about patient blood cultures  Gram stain Anaerobic bottle:   Gram positive cocci in clusters   resembling Staphylococcus   Information to follow

## 2020-12-24 NOTE — PROGRESS NOTES
Physician Progress Note      Dilia Urrutia  [facility] #:                  [id number]  :                       1935  ADMIT DATE:       2020 9:05 PM  [address] [place] DATE:  RESPONDING  PROVIDER #:        Paulino Tejeda MD          QUERY TEXT:    Patient admitted with Covid-19. Noted documentation of acute respiratory   failure in progress notes. Please indicate one of the following and document   in the medical record: The medical record reflects the following:  Risk Factors: Covid-19 Pneumonia  Clinical Indicators: Patient was placed on 2 LNC for saturations of 93%,   previously 84% on room air. Respirations 16-22, with one count of RR 27. Nursing assessment of unlabored, normal breathing. Patient was positive for   cough and shortness of breath in the emergency department. Per ED assessment:   Rspirations 16-22, with one count of RR 27. Nursing assessm  Treatment: oxygen at 1.5 LNC, ABT    Acute Respiratory Failure Clinical Indicators per 3M MS-DRG Training Guide and   Quick Reference Guide:  pO2 < 60 mmHg or SpO2 (pulse oximetry) < 91% breathing room air. pCO2 > 50 and   pH < 7.35  P/F ratio (pO2 / FIO2) < 300  pO2 decrease or pCO2 increase by 10 mmHg from baseline (if known)  Supplemental oxygen of 40% or more  Presence of respiratory distress, tachypnea, dyspnea, shortness of breath,   wheezing  Unable to speak in complete sentences  Use of accessory muscles to breathe  Extreme anxiety and feeling of impending doom  Tripod position  Confusion/altered mental status/obtunded  Options provided:  -- Acute Respiratory Failure currently as evidenced by, Please document   evidence.   -- Currently resolved Acute Respiratory Failure was evidenced by, Please   document evidence  -- Acute Respiratory Failure ruled out after study  -- Other - I will add my own diagnosis  -- Disagree - Not applicable / Not valid  -- Disagree - Clinically unable to determine / Unknown -- Refer to Clinical Documentation Reviewer    PROVIDER RESPONSE TEXT:    This patient is in acute respiratory failure as evidenced by hypoxia on room   air of 84    Query created by: Sharita Montilla on 12/24/2020 7:01 AM      Electronically signed by:  Kourtney Warner MD 12/24/2020 7:22 AM

## 2020-12-25 ENCOUNTER — APPOINTMENT (OUTPATIENT)
Dept: GENERAL RADIOLOGY | Age: 85
DRG: 177 | End: 2020-12-25
Payer: COMMERCIAL

## 2020-12-25 ENCOUNTER — APPOINTMENT (OUTPATIENT)
Dept: CT IMAGING | Age: 85
DRG: 177 | End: 2020-12-25
Payer: COMMERCIAL

## 2020-12-25 LAB
ALBUMIN SERPL-MCNC: 3.2 G/DL (ref 3.4–5)
ANION GAP SERPL CALCULATED.3IONS-SCNC: 8 MMOL/L (ref 3–16)
BASOPHILS ABSOLUTE: 0 K/UL (ref 0–0.2)
BASOPHILS RELATIVE PERCENT: 0.6 %
BUN BLDV-MCNC: 16 MG/DL (ref 7–20)
CALCIUM SERPL-MCNC: 9.4 MG/DL (ref 8.3–10.6)
CHLORIDE BLD-SCNC: 103 MMOL/L (ref 99–110)
CO2: 27 MMOL/L (ref 21–32)
CREAT SERPL-MCNC: 0.6 MG/DL (ref 0.6–1.2)
EKG ATRIAL RATE: 64 BPM
EKG DIAGNOSIS: NORMAL
EKG P AXIS: 37 DEGREES
EKG P-R INTERVAL: 156 MS
EKG Q-T INTERVAL: 420 MS
EKG QRS DURATION: 80 MS
EKG QTC CALCULATION (BAZETT): 433 MS
EKG R AXIS: -14 DEGREES
EKG T AXIS: 38 DEGREES
EKG VENTRICULAR RATE: 64 BPM
EOSINOPHILS ABSOLUTE: 0.3 K/UL (ref 0–0.6)
EOSINOPHILS RELATIVE PERCENT: 3.5 %
GFR AFRICAN AMERICAN: >60
GFR NON-AFRICAN AMERICAN: >60
GLUCOSE BLD-MCNC: 112 MG/DL (ref 70–99)
GLUCOSE BLD-MCNC: 96 MG/DL (ref 70–99)
HCT VFR BLD CALC: 41.6 % (ref 36–48)
HEMOGLOBIN: 13.3 G/DL (ref 12–16)
LYMPHOCYTES ABSOLUTE: 1.5 K/UL (ref 1–5.1)
LYMPHOCYTES RELATIVE PERCENT: 20.5 %
MAGNESIUM: 2.1 MG/DL (ref 1.8–2.4)
MCH RBC QN AUTO: 28.4 PG (ref 26–34)
MCHC RBC AUTO-ENTMCNC: 32 G/DL (ref 31–36)
MCV RBC AUTO: 88.9 FL (ref 80–100)
MONOCYTES ABSOLUTE: 0.8 K/UL (ref 0–1.3)
MONOCYTES RELATIVE PERCENT: 11.3 %
NEUTROPHILS ABSOLUTE: 4.7 K/UL (ref 1.7–7.7)
NEUTROPHILS RELATIVE PERCENT: 64.1 %
PDW BLD-RTO: 14.2 % (ref 12.4–15.4)
PERFORMED ON: ABNORMAL
PHOSPHORUS: 3.2 MG/DL (ref 2.5–4.9)
PLATELET # BLD: 254 K/UL (ref 135–450)
PMV BLD AUTO: 8.9 FL (ref 5–10.5)
POTASSIUM SERPL-SCNC: 3.9 MMOL/L (ref 3.5–5.1)
RBC # BLD: 4.69 M/UL (ref 4–5.2)
SODIUM BLD-SCNC: 138 MMOL/L (ref 136–145)
WBC # BLD: 7.4 K/UL (ref 4–11)

## 2020-12-25 PROCEDURE — 70450 CT HEAD/BRAIN W/O DYE: CPT

## 2020-12-25 PROCEDURE — 73030 X-RAY EXAM OF SHOULDER: CPT

## 2020-12-25 PROCEDURE — 94761 N-INVAS EAR/PLS OXIMETRY MLT: CPT

## 2020-12-25 PROCEDURE — 93010 ELECTROCARDIOGRAM REPORT: CPT | Performed by: INTERNAL MEDICINE

## 2020-12-25 PROCEDURE — 85025 COMPLETE CBC W/AUTO DIFF WBC: CPT

## 2020-12-25 PROCEDURE — 83735 ASSAY OF MAGNESIUM: CPT

## 2020-12-25 PROCEDURE — 1200000000 HC SEMI PRIVATE

## 2020-12-25 PROCEDURE — 6370000000 HC RX 637 (ALT 250 FOR IP): Performed by: INTERNAL MEDICINE

## 2020-12-25 PROCEDURE — 6360000002 HC RX W HCPCS: Performed by: INTERNAL MEDICINE

## 2020-12-25 PROCEDURE — 36415 COLL VENOUS BLD VENIPUNCTURE: CPT

## 2020-12-25 PROCEDURE — 2700000000 HC OXYGEN THERAPY PER DAY

## 2020-12-25 PROCEDURE — 2580000003 HC RX 258: Performed by: INTERNAL MEDICINE

## 2020-12-25 PROCEDURE — 80069 RENAL FUNCTION PANEL: CPT

## 2020-12-25 RX ORDER — DEXAMETHASONE SODIUM PHOSPHATE 10 MG/ML
10 INJECTION, SOLUTION INTRAMUSCULAR; INTRAVENOUS EVERY 24 HOURS
Status: DISCONTINUED | OUTPATIENT
Start: 2020-12-26 | End: 2020-12-26

## 2020-12-25 RX ORDER — KETOROLAC TROMETHAMINE 30 MG/ML
15 INJECTION, SOLUTION INTRAMUSCULAR; INTRAVENOUS ONCE
Status: COMPLETED | OUTPATIENT
Start: 2020-12-25 | End: 2020-12-25

## 2020-12-25 RX ADMIN — Medication 10 ML: at 10:02

## 2020-12-25 RX ADMIN — ACETAMINOPHEN 650 MG: 325 TABLET ORAL at 11:33

## 2020-12-25 RX ADMIN — KETOROLAC TROMETHAMINE 15 MG: 30 INJECTION, SOLUTION INTRAMUSCULAR at 17:12

## 2020-12-25 RX ADMIN — LEVOTHYROXINE SODIUM 100 MCG: 0.1 TABLET ORAL at 05:22

## 2020-12-25 RX ADMIN — DOCUSATE SODIUM 100 MG: 100 CAPSULE ORAL at 10:01

## 2020-12-25 RX ADMIN — Medication 10 ML: at 20:28

## 2020-12-25 RX ADMIN — ENOXAPARIN SODIUM 70 MG: 80 INJECTION SUBCUTANEOUS at 20:28

## 2020-12-25 ASSESSMENT — PAIN SCALES - GENERAL
PAINLEVEL_OUTOF10: 6
PAINLEVEL_OUTOF10: 0
PAINLEVEL_OUTOF10: 0

## 2020-12-25 NOTE — PROGRESS NOTES
Shift assessment complete. Vital signs obtained. AM medications administered per MAR. Pt denies pain and is resting comfortably in bed. Pt expresses no further needs at this time. Call light in reach. Will continue to monitor.

## 2020-12-25 NOTE — PROGRESS NOTES
Pt was in bathroom washing up when she states she got weak, fell back and hit her head on the wall behind her. PCA witnessed. Pt is c/o chest and shoulder pain. Vital signs obtained and pt repositioned back into bed. CT ordered. MD notified.

## 2020-12-25 NOTE — PROGRESS NOTES
Patient ambulated to the bathroom and assisted back into bed. Patient's oxygen saturation decreased to 88% on 1.5L. Increased oxygen to 3L, patient now at saturations >90%. Call light within reach, will monitor.

## 2020-12-25 NOTE — PROGRESS NOTES
Patient's head to toe assessment completed at this time. VSS. Patient's respirations are even and unlabored. Patient currently on 1.5L oxygen to maintain saturations >90%. All nightly medications given per MAR. Patient is alert and oriented x4. Patient denies pain, no other need expressed. Patient is SBA to bathroom. Will continue to monitor. Fall precautions in place: bed locked and in lowest position, bed alarm on, 2/4 side rails raised, non-slip socks on, call light and overhead table within reach, patient knows when to appropriately call out for help.

## 2020-12-25 NOTE — PROGRESS NOTES
[facility]    12/25/2020 11:55 AM        Name: Cole Gunn .               Admitted: 12/22/2020  Primary Care Provider: Cooper York MD (Tel: [phone])            Subjective:    Patient lying in bed shortness of breath about the same but states feels stronger today oxygen requirement has gone up to 3 L    Reviewed interval ancillary notes    Current Medications      [START ON 12/26/2020] dexamethasone (PF) (DECADRON) injection 10 mg, Q24H      enoxaparin (LOVENOX) injection 70 mg, BID      busPIRone (BUSPAR) tablet 5 mg, TID PRN      docusate sodium (COLACE) capsule 100 mg, Daily      levothyroxine (SYNTHROID) tablet 100 mcg, Daily      sodium chloride flush 0.9 % injection 10 mL, 2 times per day      sodium chloride flush 0.9 % injection 10 mL, PRN      promethazine (PHENERGAN) tablet 12.5 mg, Q6H PRN    Or      ondansetron (ZOFRAN) injection 4 mg, Q6H PRN      polyethylene glycol (GLYCOLAX) packet 17 g, Daily PRN      acetaminophen (TYLENOL) tablet 650 mg, Q6H PRN    Or      acetaminophen (TYLENOL) suppository 650 mg, Q6H PRN      albuterol sulfate  (90 Base) MCG/ACT inhaler 2 puff, Q6H PRN        Objective:  /72   Pulse 73   Temp 97.5 °F (36.4 °C) (Axillary)   Resp 16   Ht 5' 7\" (1.702 m)   Wt 151 lb 1 oz (68.5 kg)   SpO2 98%   BMI 23.66 kg/m²     Intake/Output Summary (Last 24 hours) at 12/25/2020 1155  Last data filed at 12/25/2020 1000  Gross per 24 hour   Intake 240 ml   Output    Net 240 ml      Wt Readings from Last 3 Encounters:   12/25/20 151 lb 1 oz (68.5 kg)   09/21/20 166 lb 2 oz (75.4 kg)   09/15/20 160 lb (72.6 kg)       Patient examined from window as covid 19 pna  aaox3 not tachypnic  No accesory muscle usage able to talk in full sentances no gross focal deficts      Labs and Tests:  CBC:   Recent Labs     12/23/20  0701 12/24/20  0508 12/25/20  0604 WBC 4.2 8.8 7.4   HGB 13.1 13.3 13.3    254 254     BMP:    Recent Labs     12/23/20  0701 12/24/20  0508 12/25/20  0604    138 138   K 4.5 4.0 3.9    105 103   CO2 24 22 27   BUN 10 15 16   CREATININE 0.5* 0.5* 0.6   GLUCOSE 158* 118* 96     Hepatic:   Recent Labs     12/22/20  1644   AST 18   ALT 7*   BILITOT 0.5   ALKPHOS 80     CT HEAD WO CONTRAST   Final Result   No acute intracranial abnormality. CT CHEST PULMONARY EMBOLISM W CONTRAST   Final Result   No evidence of pulmonary embolism. Scattered areas of patchy and ground-glass opacities are noted throughout the   bilateral lungs. These are nonspecific but could indicate an atypical/viral   pneumonia, inclusive of COVID-19. Mild anterior compression of the T5 vertebral body with about 25% anterior   height loss, this is age indeterminate but is probably chronic in nature. Small hiatal hernia. XR CHEST PORTABLE   Final Result   Patchy areas of airspace disease within both lungs, most compatible with   pneumonia. Given the features, atypical pneumonia would be favored. XR SHOULDER RIGHT (MIN 2 VIEWS)    (Results Pending)   XR SHOULDER LEFT (MIN 2 VIEWS)    (Results Pending)       Recent imaging reviewed    Problem List  Active Problems:    Shortness of breath  Resolved Problems:    * No resolved hospital problems. *       Assessment & Plan:   Acute Hypoxic respiratory failure secondary to COVID-19 pneumonia and possible bacterial pneumonia  -fu culture  - increase decadron  - wean o2 as toelrated    Hypothyroidism: home meds    Diet: DIET DYSPHAGIA SOFT AND BITE-SIZED;   Code:Full Code  DVT PPXlovenox  Disposition home hopefully in 4- 5days days      Venkatesh Garcia MD   12/25/2020 11:55 AM

## 2020-12-25 NOTE — PROGRESS NOTES
Spoke with pt's daughter Jr Dorado, gave update on pt status and POC. All questions and concerns addressed.

## 2020-12-25 NOTE — PROGRESS NOTES
Morning VSS. AM medications given per MAR. Patient on 3L NC supplemental oxygen to maintain saturations above 90%. Patient ambulated to bathroom, tried to have BM. Patient now resting comfortably in bed. Patient denies pain at this time, no other needs expressed. Call light within reach, will monitor. Trash and linens taken out at this time.

## 2020-12-26 LAB
ALBUMIN SERPL-MCNC: 3 G/DL (ref 3.4–5)
ANION GAP SERPL CALCULATED.3IONS-SCNC: 7 MMOL/L (ref 3–16)
BASOPHILS ABSOLUTE: 0 K/UL (ref 0–0.2)
BASOPHILS RELATIVE PERCENT: 0.6 %
BLOOD CULTURE, ROUTINE: ABNORMAL
BUN BLDV-MCNC: 14 MG/DL (ref 7–20)
C-REACTIVE PROTEIN: 36.4 MG/L (ref 0–5.1)
CALCIUM SERPL-MCNC: 9.2 MG/DL (ref 8.3–10.6)
CHLORIDE BLD-SCNC: 103 MMOL/L (ref 99–110)
CO2: 27 MMOL/L (ref 21–32)
CREAT SERPL-MCNC: 0.5 MG/DL (ref 0.6–1.2)
CULTURE, BLOOD 2: NORMAL
D DIMER: 4431 NG/ML DDU (ref 0–229)
D DIMER: 4917 NG/ML DDU (ref 0–229)
EOSINOPHILS ABSOLUTE: 0.6 K/UL (ref 0–0.6)
EOSINOPHILS RELATIVE PERCENT: 8.6 %
GFR AFRICAN AMERICAN: >60
GFR NON-AFRICAN AMERICAN: >60
GLUCOSE BLD-MCNC: 112 MG/DL (ref 70–99)
HCT VFR BLD CALC: 40.1 % (ref 36–48)
HEMOGLOBIN: 13.1 G/DL (ref 12–16)
LYMPHOCYTES ABSOLUTE: 0.8 K/UL (ref 1–5.1)
LYMPHOCYTES RELATIVE PERCENT: 10.6 %
MAGNESIUM: 2.3 MG/DL (ref 1.8–2.4)
MCH RBC QN AUTO: 29 PG (ref 26–34)
MCHC RBC AUTO-ENTMCNC: 32.6 G/DL (ref 31–36)
MCV RBC AUTO: 88.8 FL (ref 80–100)
MONOCYTES ABSOLUTE: 0.6 K/UL (ref 0–1.3)
MONOCYTES RELATIVE PERCENT: 8 %
NEUTROPHILS ABSOLUTE: 5.3 K/UL (ref 1.7–7.7)
NEUTROPHILS RELATIVE PERCENT: 72.2 %
ORGANISM: ABNORMAL
PDW BLD-RTO: 14.2 % (ref 12.4–15.4)
PHOSPHORUS: 3.3 MG/DL (ref 2.5–4.9)
PLATELET # BLD: 207 K/UL (ref 135–450)
PMV BLD AUTO: 8 FL (ref 5–10.5)
POTASSIUM SERPL-SCNC: 4.4 MMOL/L (ref 3.5–5.1)
RBC # BLD: 4.51 M/UL (ref 4–5.2)
SODIUM BLD-SCNC: 137 MMOL/L (ref 136–145)
WBC # BLD: 7.3 K/UL (ref 4–11)

## 2020-12-26 PROCEDURE — 86140 C-REACTIVE PROTEIN: CPT

## 2020-12-26 PROCEDURE — 2580000003 HC RX 258: Performed by: INTERNAL MEDICINE

## 2020-12-26 PROCEDURE — 85379 FIBRIN DEGRADATION QUANT: CPT

## 2020-12-26 PROCEDURE — 80069 RENAL FUNCTION PANEL: CPT

## 2020-12-26 PROCEDURE — 83735 ASSAY OF MAGNESIUM: CPT

## 2020-12-26 PROCEDURE — 6370000000 HC RX 637 (ALT 250 FOR IP): Performed by: INTERNAL MEDICINE

## 2020-12-26 PROCEDURE — 85025 COMPLETE CBC W/AUTO DIFF WBC: CPT

## 2020-12-26 PROCEDURE — 1200000000 HC SEMI PRIVATE

## 2020-12-26 PROCEDURE — 36415 COLL VENOUS BLD VENIPUNCTURE: CPT

## 2020-12-26 PROCEDURE — 6360000002 HC RX W HCPCS: Performed by: INTERNAL MEDICINE

## 2020-12-26 RX ORDER — MORPHINE SULFATE 2 MG/ML
2 INJECTION, SOLUTION INTRAMUSCULAR; INTRAVENOUS EVERY 4 HOURS PRN
Status: DISCONTINUED | OUTPATIENT
Start: 2020-12-26 | End: 2020-12-31 | Stop reason: HOSPADM

## 2020-12-26 RX ORDER — DEXAMETHASONE SODIUM PHOSPHATE 10 MG/ML
10 INJECTION, SOLUTION INTRAMUSCULAR; INTRAVENOUS ONCE
Status: COMPLETED | OUTPATIENT
Start: 2020-12-26 | End: 2020-12-26

## 2020-12-26 RX ADMIN — DEXAMETHASONE SODIUM PHOSPHATE 10 MG: 10 INJECTION, SOLUTION INTRAMUSCULAR; INTRAVENOUS at 10:46

## 2020-12-26 RX ADMIN — LEVOTHYROXINE SODIUM 100 MCG: 0.1 TABLET ORAL at 06:28

## 2020-12-26 RX ADMIN — Medication 10 ML: at 20:54

## 2020-12-26 RX ADMIN — DEXAMETHASONE SODIUM PHOSPHATE 10 MG: 10 INJECTION, SOLUTION INTRAMUSCULAR; INTRAVENOUS at 13:41

## 2020-12-26 RX ADMIN — ACETAMINOPHEN 650 MG: 325 TABLET ORAL at 00:06

## 2020-12-26 RX ADMIN — ENOXAPARIN SODIUM 70 MG: 80 INJECTION SUBCUTANEOUS at 20:53

## 2020-12-26 RX ADMIN — ACETAMINOPHEN 650 MG: 325 TABLET ORAL at 06:28

## 2020-12-26 RX ADMIN — BISACODYL 5 MG: 5 TABLET, COATED ORAL at 20:53

## 2020-12-26 RX ADMIN — ACETAMINOPHEN 650 MG: 325 TABLET ORAL at 20:53

## 2020-12-26 RX ADMIN — Medication 10 ML: at 10:47

## 2020-12-26 RX ADMIN — BUSPIRONE HYDROCHLORIDE 5 MG: 5 TABLET ORAL at 20:53

## 2020-12-26 RX ADMIN — BUSPIRONE HYDROCHLORIDE 5 MG: 5 TABLET ORAL at 00:06

## 2020-12-26 RX ADMIN — ENOXAPARIN SODIUM 70 MG: 80 INJECTION SUBCUTANEOUS at 10:46

## 2020-12-26 RX ADMIN — DOCUSATE SODIUM 100 MG: 100 CAPSULE ORAL at 10:46

## 2020-12-26 ASSESSMENT — PAIN SCALES - GENERAL
PAINLEVEL_OUTOF10: 4
PAINLEVEL_OUTOF10: 6
PAINLEVEL_OUTOF10: 5

## 2020-12-26 ASSESSMENT — PAIN DESCRIPTION - PAIN TYPE
TYPE: ACUTE PAIN
TYPE: ACUTE PAIN

## 2020-12-26 NOTE — PROGRESS NOTES
Shift assessment completed. Pt alert & oriented, able to communicate needs. Vital sign stable. Scheduled medication given. Does not appear to be in distress.  Bed in low position. Call light within reach. Will continue to monitor.

## 2020-12-26 NOTE — PROGRESS NOTES
6 beats of Vtach seen on telemetry. Dr. Chaka Cunningham on unit and reviewed tele, no new orders, will monitor. Pt c/o chest pain from after the fall, heating pad applied. Morphine ordered, pt refused.

## 2020-12-26 NOTE — PROGRESS NOTES
[facility]    12/26/2020 11:17 AM        Name: Maggie Cash .               Admitted: 12/22/2020  Primary Care Provider: Sergio Scott MD (Tel: [phone])            Subjective:    Shortness of breath about the same feels a little bit weaker today no nausea vomiting or diarrhea  Reviewed interval ancillary notes    Current Medications      morphine (PF) injection 2 mg, Q4H PRN      [START ON 12/27/2020] dexamethasone (DECADRON) 20 mg in sodium chloride 0.9 % IVPB, Q24H      dexamethasone (PF) (DECADRON) injection 10 mg, Once      enoxaparin (LOVENOX) injection 70 mg, BID      bisacodyl (DULCOLAX) EC tablet 5 mg, Daily PRN      busPIRone (BUSPAR) tablet 5 mg, TID PRN      docusate sodium (COLACE) capsule 100 mg, Daily      levothyroxine (SYNTHROID) tablet 100 mcg, Daily      sodium chloride flush 0.9 % injection 10 mL, 2 times per day      sodium chloride flush 0.9 % injection 10 mL, PRN      promethazine (PHENERGAN) tablet 12.5 mg, Q6H PRN    Or      ondansetron (ZOFRAN) injection 4 mg, Q6H PRN      polyethylene glycol (GLYCOLAX) packet 17 g, Daily PRN      acetaminophen (TYLENOL) tablet 650 mg, Q6H PRN    Or      acetaminophen (TYLENOL) suppository 650 mg, Q6H PRN      albuterol sulfate  (90 Base) MCG/ACT inhaler 2 puff, Q6H PRN        Objective:  /75   Pulse 90   Temp 97.8 °F (36.6 °C) (Oral)   Resp 18   Ht 5' 7\" (1.702 m)   Wt 155 lb 3.2 oz (70.4 kg)   SpO2 93%   BMI 24.31 kg/m²     Intake/Output Summary (Last 24 hours) at 12/26/2020 1117  Last data filed at 12/26/2020 0645  Gross per 24 hour   Intake    Output 600 ml   Net -600 ml      Wt Readings from Last 3 Encounters:   12/26/20 155 lb 3.2 oz (70.4 kg)   09/21/20 166 lb 2 oz (75.4 kg)   09/15/20 160 lb (72.6 kg)       Patient examined from window as covid 19 pna aaox3 not tachypnic  No accesory muscle usage able to talk in full sentances no gross focal deficts      Labs and Tests:  CBC:   Recent Labs     12/24/20  0508 12/25/20  0604 12/26/20  0625   WBC 8.8 7.4 7.3   HGB 13.3 13.3 13.1    254 207     BMP:    Recent Labs     12/24/20  0508 12/25/20  0604 12/26/20  0625    138 137   K 4.0 3.9 4.4    103 103   CO2 22 27 27   BUN 15 16 14   CREATININE 0.5* 0.6 0.5*   GLUCOSE 118* 96 112*     Hepatic:   No results for input(s): AST, ALT, ALB, BILITOT, ALKPHOS in the last 72 hours. XR SHOULDER RIGHT (MIN 2 VIEWS)   Final Result   Negative for fracture         XR SHOULDER LEFT (MIN 2 VIEWS)   Final Result   No acute osseous abnormality of the left shoulder. CT HEAD WO CONTRAST   Final Result   No acute intracranial abnormality. CT CHEST PULMONARY EMBOLISM W CONTRAST   Final Result   No evidence of pulmonary embolism. Scattered areas of patchy and ground-glass opacities are noted throughout the   bilateral lungs. These are nonspecific but could indicate an atypical/viral   pneumonia, inclusive of COVID-19. Mild anterior compression of the T5 vertebral body with about 25% anterior   height loss, this is age indeterminate but is probably chronic in nature. Small hiatal hernia. XR CHEST PORTABLE   Final Result   Patchy areas of airspace disease within both lungs, most compatible with   pneumonia. Given the features, atypical pneumonia would be favored. Recent imaging reviewed    Problem List  Active Problems:    Shortness of breath  Resolved Problems:    * No resolved hospital problems. *       Assessment & Plan:   Acute Hypoxic respiratory failure secondary to COVID-19 pneumonia and possible bacterial pneumonia  - increase decadron to 20mg,   - ddimer elevated, increase lovenox to 1mg/kg bid  - wean o2 as toelrated    Hypothyroidism: home meds    Diet: DIET DYSPHAGIA SOFT AND BITE-SIZED;   Code:Full Code DVT PPXlovenox  Disposition home hopefully in 4- 5days days      Taft Peabody, MD   12/26/2020 11:17 AM

## 2020-12-27 LAB
ALBUMIN SERPL-MCNC: 3.2 G/DL (ref 3.4–5)
ANION GAP SERPL CALCULATED.3IONS-SCNC: 8 MMOL/L (ref 3–16)
BASOPHILS ABSOLUTE: 0.1 K/UL (ref 0–0.2)
BASOPHILS RELATIVE PERCENT: 1 %
BUN BLDV-MCNC: 13 MG/DL (ref 7–20)
C-REACTIVE PROTEIN: 50.9 MG/L (ref 0–5.1)
CALCIUM SERPL-MCNC: 9.2 MG/DL (ref 8.3–10.6)
CHLORIDE BLD-SCNC: 103 MMOL/L (ref 99–110)
CO2: 28 MMOL/L (ref 21–32)
CREAT SERPL-MCNC: 0.6 MG/DL (ref 0.6–1.2)
D DIMER: 2132 NG/ML DDU (ref 0–229)
EOSINOPHILS ABSOLUTE: 0.1 K/UL (ref 0–0.6)
EOSINOPHILS RELATIVE PERCENT: 1 %
GFR AFRICAN AMERICAN: >60
GFR NON-AFRICAN AMERICAN: >60
GLUCOSE BLD-MCNC: 176 MG/DL (ref 70–99)
HCT VFR BLD CALC: 40.9 % (ref 36–48)
HEMOGLOBIN: 13.3 G/DL (ref 12–16)
LYMPHOCYTES ABSOLUTE: 1.2 K/UL (ref 1–5.1)
LYMPHOCYTES RELATIVE PERCENT: 11 %
MAGNESIUM: 2.1 MG/DL (ref 1.8–2.4)
MCH RBC QN AUTO: 28.7 PG (ref 26–34)
MCHC RBC AUTO-ENTMCNC: 32.5 G/DL (ref 31–36)
MCV RBC AUTO: 88.2 FL (ref 80–100)
MONOCYTES ABSOLUTE: 0.3 K/UL (ref 0–1.3)
MONOCYTES RELATIVE PERCENT: 3 %
NEUTROPHILS ABSOLUTE: 9.2 K/UL (ref 1.7–7.7)
NEUTROPHILS RELATIVE PERCENT: 84 %
PDW BLD-RTO: 14 % (ref 12.4–15.4)
PHOSPHORUS: 2.7 MG/DL (ref 2.5–4.9)
PLATELET # BLD: 240 K/UL (ref 135–450)
PLATELET SLIDE REVIEW: ADEQUATE
PMV BLD AUTO: 8.4 FL (ref 5–10.5)
POTASSIUM SERPL-SCNC: 4 MMOL/L (ref 3.5–5.1)
RBC # BLD: 4.64 M/UL (ref 4–5.2)
RBC # BLD: NORMAL 10*6/UL
SLIDE REVIEW: ABNORMAL
SODIUM BLD-SCNC: 139 MMOL/L (ref 136–145)
WBC # BLD: 11 K/UL (ref 4–11)

## 2020-12-27 PROCEDURE — 83735 ASSAY OF MAGNESIUM: CPT

## 2020-12-27 PROCEDURE — 85379 FIBRIN DEGRADATION QUANT: CPT

## 2020-12-27 PROCEDURE — 2580000003 HC RX 258: Performed by: INTERNAL MEDICINE

## 2020-12-27 PROCEDURE — 94761 N-INVAS EAR/PLS OXIMETRY MLT: CPT

## 2020-12-27 PROCEDURE — 6370000000 HC RX 637 (ALT 250 FOR IP): Performed by: INTERNAL MEDICINE

## 2020-12-27 PROCEDURE — 2700000000 HC OXYGEN THERAPY PER DAY

## 2020-12-27 PROCEDURE — 6360000002 HC RX W HCPCS: Performed by: INTERNAL MEDICINE

## 2020-12-27 PROCEDURE — 80069 RENAL FUNCTION PANEL: CPT

## 2020-12-27 PROCEDURE — 85025 COMPLETE CBC W/AUTO DIFF WBC: CPT

## 2020-12-27 PROCEDURE — 1200000000 HC SEMI PRIVATE

## 2020-12-27 PROCEDURE — 36415 COLL VENOUS BLD VENIPUNCTURE: CPT

## 2020-12-27 PROCEDURE — 86140 C-REACTIVE PROTEIN: CPT

## 2020-12-27 RX ADMIN — ENOXAPARIN SODIUM 70 MG: 80 INJECTION SUBCUTANEOUS at 20:07

## 2020-12-27 RX ADMIN — Medication 10 ML: at 20:08

## 2020-12-27 RX ADMIN — Medication 10 ML: at 11:25

## 2020-12-27 RX ADMIN — LEVOTHYROXINE SODIUM 100 MCG: 0.1 TABLET ORAL at 05:47

## 2020-12-27 RX ADMIN — DOCUSATE SODIUM 100 MG: 100 CAPSULE ORAL at 09:57

## 2020-12-27 RX ADMIN — DEXAMETHASONE SODIUM PHOSPHATE 20 MG: 4 INJECTION, SOLUTION INTRAMUSCULAR; INTRAVENOUS at 11:25

## 2020-12-27 RX ADMIN — ENOXAPARIN SODIUM 70 MG: 80 INJECTION SUBCUTANEOUS at 09:57

## 2020-12-27 ASSESSMENT — PAIN SCALES - GENERAL
PAINLEVEL_OUTOF10: 0
PAINLEVEL_OUTOF10: 0
PAINLEVEL_OUTOF10: 3

## 2020-12-27 NOTE — PROGRESS NOTES
Shift assessment completed. Vital sign stable,  Pt alert & oriented. HS meds given per MAR. Does not appear to be in distress.  Bed in low position.  Call light within reach.

## 2020-12-27 NOTE — PLAN OF CARE
Problem: Falls - Risk of:  Goal: Will remain free from falls  Description: Will remain free from falls  Outcome: Ongoing  Goal: Absence of physical injury  Description: Absence of physical injury  Outcome: Ongoing     Problem: Airway Clearance - Ineffective  Goal: Achieve or maintain patent airway  Outcome: Ongoing     Problem: Gas Exchange - Impaired  Goal: Absence of hypoxia  Outcome: Ongoing  Goal: Promote optimal lung function  Outcome: Ongoing     Problem: Pain:  Goal: Pain level will decrease  Description: Pain level will decrease  Outcome: Ongoing  Goal: Control of acute pain  Description: Control of acute pain  Outcome: Ongoing  Goal: Control of chronic pain  Description: Control of chronic pain  Outcome: Ongoing

## 2020-12-27 NOTE — PROGRESS NOTES
Pt up to restroom continent of large stool. Pt weak while washing hands and became weak and could not follow commands. Nail beds are cyanotic  Nurses on unit assisted this nurse to sitting pt down on bedside commode. Pt became more alert and aware. Vitals obtained pt required 10L high flow to sat about 88. Pt assisted into bed. VSS resting in bed neuro checks per nursing judgement WNL. Call light in reach alarm on for pt safety.

## 2020-12-27 NOTE — PROGRESS NOTES
[facility]    12/27/2020 2:20 PM        Name: Alexa Roberts .               Admitted: 12/22/2020  Primary Care Provider: Maryanne Sharma MD (Tel: [phone])            Subjective:    Patient with sob this morning, resolved and back down to 3L states feels much better today then yesterday   Reviewed interval ancillary notes    Current Medications      morphine (PF) injection 2 mg, Q4H PRN      dexamethasone (DECADRON) 20 mg in sodium chloride 0.9 % IVPB, Q24H      enoxaparin (LOVENOX) injection 70 mg, BID      bisacodyl (DULCOLAX) EC tablet 5 mg, Daily PRN      busPIRone (BUSPAR) tablet 5 mg, TID PRN      docusate sodium (COLACE) capsule 100 mg, Daily      levothyroxine (SYNTHROID) tablet 100 mcg, Daily      sodium chloride flush 0.9 % injection 10 mL, 2 times per day      sodium chloride flush 0.9 % injection 10 mL, PRN      promethazine (PHENERGAN) tablet 12.5 mg, Q6H PRN    Or      ondansetron (ZOFRAN) injection 4 mg, Q6H PRN      polyethylene glycol (GLYCOLAX) packet 17 g, Daily PRN      acetaminophen (TYLENOL) tablet 650 mg, Q6H PRN    Or      acetaminophen (TYLENOL) suppository 650 mg, Q6H PRN      albuterol sulfate  (90 Base) MCG/ACT inhaler 2 puff, Q6H PRN        Objective:  /69   Pulse 82   Temp 97.8 °F (36.6 °C) (Oral)   Resp 16   Ht 5' 7\" (1.702 m)   Wt 156 lb 11.2 oz (71.1 kg)   SpO2 95%   BMI 24.54 kg/m²     Intake/Output Summary (Last 24 hours) at 12/27/2020 1420  Last data filed at 12/27/2020 1031  Gross per 24 hour   Intake 480 ml   Output 350 ml   Net 130 ml      Wt Readings from Last 3 Encounters:   12/27/20 156 lb 11.2 oz (71.1 kg)   09/21/20 166 lb 2 oz (75.4 kg)   09/15/20 160 lb (72.6 kg)       Patient examined from window as covid 19 pna  aaox3 not tachypnic  No accesory muscle usage able to talk in full sentances no gross focal deficts      Labs and Tests: CBC:   Recent Labs     12/25/20  0604 12/26/20  0625 12/27/20  0845   WBC 7.4 7.3 11.0   HGB 13.3 13.1 13.3    207 240     BMP:    Recent Labs     12/25/20  0604 12/26/20  0625 12/27/20  0845    137 139   K 3.9 4.4 4.0    103 103   CO2 27 27 28   BUN 16 14 13   CREATININE 0.6 0.5* 0.6   GLUCOSE 96 112* 176*     Hepatic:   No results for input(s): AST, ALT, ALB, BILITOT, ALKPHOS in the last 72 hours. XR SHOULDER RIGHT (MIN 2 VIEWS)   Final Result   Negative for fracture         XR SHOULDER LEFT (MIN 2 VIEWS)   Final Result   No acute osseous abnormality of the left shoulder. CT HEAD WO CONTRAST   Final Result   No acute intracranial abnormality. CT CHEST PULMONARY EMBOLISM W CONTRAST   Final Result   No evidence of pulmonary embolism. Scattered areas of patchy and ground-glass opacities are noted throughout the   bilateral lungs. These are nonspecific but could indicate an atypical/viral   pneumonia, inclusive of COVID-19. Mild anterior compression of the T5 vertebral body with about 25% anterior   height loss, this is age indeterminate but is probably chronic in nature. Small hiatal hernia. XR CHEST PORTABLE   Final Result   Patchy areas of airspace disease within both lungs, most compatible with   pneumonia. Given the features, atypical pneumonia would be favored. Recent imaging reviewed    Problem List  Active Problems:    Shortness of breath  Resolved Problems:    * No resolved hospital problems. *       Assessment & Plan:   Acute Hypoxic respiratory failure secondary to COVID-19 pneumonia , less likely bacterial component work up negative  - increase decadron to 20mg,   - lovenox to 1mg/kg bid ddimer trending down  - wean o2 as toelrated    Hypothyroidism: home meds    Diet: DIET DYSPHAGIA SOFT AND BITE-SIZED;   Code:Full Code  DVT PPXlovenox  Disposition home hopefully in 3-4days days      Brandon Prince MD   12/27/2020 2:20 PM

## 2020-12-28 LAB
ALBUMIN SERPL-MCNC: 3.1 G/DL (ref 3.4–5)
ANION GAP SERPL CALCULATED.3IONS-SCNC: 7 MMOL/L (ref 3–16)
BASOPHILS ABSOLUTE: 0 K/UL (ref 0–0.2)
BASOPHILS RELATIVE PERCENT: 0 %
BUN BLDV-MCNC: 18 MG/DL (ref 7–20)
C-REACTIVE PROTEIN: 20.9 MG/L (ref 0–5.1)
CALCIUM SERPL-MCNC: 9.4 MG/DL (ref 8.3–10.6)
CHLORIDE BLD-SCNC: 103 MMOL/L (ref 99–110)
CO2: 27 MMOL/L (ref 21–32)
CREAT SERPL-MCNC: 0.5 MG/DL (ref 0.6–1.2)
D DIMER: 722 NG/ML DDU (ref 0–229)
EOSINOPHILS ABSOLUTE: 0.1 K/UL (ref 0–0.6)
EOSINOPHILS RELATIVE PERCENT: 1 %
GFR AFRICAN AMERICAN: >60
GFR NON-AFRICAN AMERICAN: >60
GLUCOSE BLD-MCNC: 109 MG/DL (ref 70–99)
HCT VFR BLD CALC: 37.2 % (ref 36–48)
HEMOGLOBIN: 12.2 G/DL (ref 12–16)
LYMPHOCYTES ABSOLUTE: 1.5 K/UL (ref 1–5.1)
LYMPHOCYTES RELATIVE PERCENT: 16 %
MAGNESIUM: 2.1 MG/DL (ref 1.8–2.4)
MCH RBC QN AUTO: 28.7 PG (ref 26–34)
MCHC RBC AUTO-ENTMCNC: 32.7 G/DL (ref 31–36)
MCV RBC AUTO: 87.7 FL (ref 80–100)
MONOCYTES ABSOLUTE: 0.6 K/UL (ref 0–1.3)
MONOCYTES RELATIVE PERCENT: 7 %
NEUTROPHILS ABSOLUTE: 7 K/UL (ref 1.7–7.7)
NEUTROPHILS RELATIVE PERCENT: 76 %
PDW BLD-RTO: 14.2 % (ref 12.4–15.4)
PHOSPHORUS: 3.6 MG/DL (ref 2.5–4.9)
PLATELET # BLD: 244 K/UL (ref 135–450)
PLATELET SLIDE REVIEW: ADEQUATE
PMV BLD AUTO: 8.6 FL (ref 5–10.5)
POTASSIUM SERPL-SCNC: 4.5 MMOL/L (ref 3.5–5.1)
RBC # BLD: 4.24 M/UL (ref 4–5.2)
SLIDE REVIEW: NORMAL
SODIUM BLD-SCNC: 137 MMOL/L (ref 136–145)
WBC # BLD: 9.2 K/UL (ref 4–11)

## 2020-12-28 PROCEDURE — 80069 RENAL FUNCTION PANEL: CPT

## 2020-12-28 PROCEDURE — 2580000003 HC RX 258: Performed by: INTERNAL MEDICINE

## 2020-12-28 PROCEDURE — 85379 FIBRIN DEGRADATION QUANT: CPT

## 2020-12-28 PROCEDURE — 83735 ASSAY OF MAGNESIUM: CPT

## 2020-12-28 PROCEDURE — 85025 COMPLETE CBC W/AUTO DIFF WBC: CPT

## 2020-12-28 PROCEDURE — 36415 COLL VENOUS BLD VENIPUNCTURE: CPT

## 2020-12-28 PROCEDURE — 86140 C-REACTIVE PROTEIN: CPT

## 2020-12-28 PROCEDURE — 6370000000 HC RX 637 (ALT 250 FOR IP): Performed by: NURSE PRACTITIONER

## 2020-12-28 PROCEDURE — 6360000002 HC RX W HCPCS: Performed by: INTERNAL MEDICINE

## 2020-12-28 PROCEDURE — 1200000000 HC SEMI PRIVATE

## 2020-12-28 PROCEDURE — 6370000000 HC RX 637 (ALT 250 FOR IP): Performed by: INTERNAL MEDICINE

## 2020-12-28 RX ORDER — LANOLIN ALCOHOL/MO/W.PET/CERES
3 CREAM (GRAM) TOPICAL NIGHTLY PRN
Status: COMPLETED | OUTPATIENT
Start: 2020-12-28 | End: 2020-12-28

## 2020-12-28 RX ORDER — DEXAMETHASONE SODIUM PHOSPHATE 10 MG/ML
10 INJECTION, SOLUTION INTRAMUSCULAR; INTRAVENOUS EVERY 24 HOURS
Status: DISCONTINUED | OUTPATIENT
Start: 2020-12-28 | End: 2020-12-30

## 2020-12-28 RX ADMIN — Medication 10 ML: at 08:09

## 2020-12-28 RX ADMIN — DOCUSATE SODIUM 100 MG: 100 CAPSULE ORAL at 08:09

## 2020-12-28 RX ADMIN — ENOXAPARIN SODIUM 70 MG: 80 INJECTION SUBCUTANEOUS at 21:40

## 2020-12-28 RX ADMIN — Medication 10 ML: at 21:40

## 2020-12-28 RX ADMIN — LEVOTHYROXINE SODIUM 100 MCG: 0.1 TABLET ORAL at 05:43

## 2020-12-28 RX ADMIN — Medication 3 MG: at 23:11

## 2020-12-28 RX ADMIN — ENOXAPARIN SODIUM 70 MG: 80 INJECTION SUBCUTANEOUS at 08:09

## 2020-12-28 RX ADMIN — DEXAMETHASONE SODIUM PHOSPHATE 10 MG: 10 INJECTION, SOLUTION INTRAMUSCULAR; INTRAVENOUS at 11:38

## 2020-12-28 RX ADMIN — Medication 3 MG: at 01:06

## 2020-12-28 NOTE — PROGRESS NOTES
Message sent to hosp. Patient admitted with shortness of breath, Patient is requesting medication for sleep but has no order, Can she get one.  Thank you

## 2020-12-28 NOTE — PLAN OF CARE
Problem: Falls - Risk of:  Goal: Will remain free from falls  Description: Will remain free from falls  Outcome: Ongoing  Goal: Absence of physical injury  Description: Absence of physical injury  Outcome: Ongoing     Problem: Airway Clearance - Ineffective  Goal: Achieve or maintain patent airway  Outcome: Ongoing     Problem: Gas Exchange - Impaired  Goal: Absence of hypoxia  Outcome: Ongoing  Goal: Promote optimal lung function  Outcome: Ongoing     Problem: Breathing Pattern - Ineffective  Goal: Ability to achieve and maintain a regular respiratory rate  Outcome: Ongoing     Problem:  Body Temperature -  Risk of, Imbalanced  Goal: Ability to maintain a body temperature within defined limits  Outcome: Ongoing  Goal: Will regain or maintain usual level of consciousness  Outcome: Ongoing  Goal: Complications related to the disease process, condition or treatment will be avoided or minimized  Outcome: Ongoing     Problem: Isolation Precautions - Risk of Spread of Infection  Goal: Prevent transmission of infection  Outcome: Ongoing     Problem: Nutrition Deficits  Goal: Optimize nutrtional status  Outcome: Ongoing     Problem: Risk for Fluid Volume Deficit  Goal: Maintain normal heart rhythm  Outcome: Ongoing  Goal: Maintain absence of muscle cramping  Outcome: Ongoing  Goal: Maintain normal serum potassium, sodium, calcium, phosphorus, and pH  Outcome: Ongoing     Problem: Loneliness or Risk for Loneliness  Goal: Demonstrate positive use of time alone when socialization is not possible  Outcome: Ongoing     Problem: Fatigue  Goal: Verbalize increase energy and improved vitality  Outcome: Ongoing     Problem: Patient Education: Go to Patient Education Activity  Goal: Patient/Family Education  Outcome: Ongoing     Problem: Pain:  Goal: Pain level will decrease  Description: Pain level will decrease  Outcome: Ongoing  Goal: Control of acute pain  Description: Control of acute pain  Outcome: Ongoing Goal: Control of chronic pain  Description: Control of chronic pain  Outcome: Ongoing

## 2020-12-28 NOTE — PROGRESS NOTES
Routine vitals stable. Scheduled medications given. No complaints of pain or SOB. Pt denies any further needs at this time. Bed alarm on. Call light within reach. Will continue to monitor.

## 2020-12-28 NOTE — CARE COORDINATION
Cm reviewed chart for d/c planning. Covid was positive on 12/23. Pt currently on 2 Liters of oxygen per n/c. On IV Decadron. No therapy ordered at this time. Will monitor.      Erika Rahman RN, BSN  [phone]

## 2020-12-29 LAB
ALBUMIN SERPL-MCNC: 3 G/DL (ref 3.4–5)
ANION GAP SERPL CALCULATED.3IONS-SCNC: 8 MMOL/L (ref 3–16)
BANDED NEUTROPHILS RELATIVE PERCENT: 1 % (ref 0–7)
BASOPHILS ABSOLUTE: 0 K/UL (ref 0–0.2)
BASOPHILS RELATIVE PERCENT: 0 %
BUN BLDV-MCNC: 15 MG/DL (ref 7–20)
C-REACTIVE PROTEIN: 10.1 MG/L (ref 0–5.1)
CALCIUM SERPL-MCNC: 9 MG/DL (ref 8.3–10.6)
CHLORIDE BLD-SCNC: 103 MMOL/L (ref 99–110)
CO2: 26 MMOL/L (ref 21–32)
CREAT SERPL-MCNC: <0.5 MG/DL (ref 0.6–1.2)
D DIMER: 749 NG/ML DDU (ref 0–229)
EOSINOPHILS ABSOLUTE: 0.3 K/UL (ref 0–0.6)
EOSINOPHILS RELATIVE PERCENT: 3 %
GFR AFRICAN AMERICAN: >60
GFR NON-AFRICAN AMERICAN: >60
GLUCOSE BLD-MCNC: 95 MG/DL (ref 70–99)
HCT VFR BLD CALC: 38.4 % (ref 36–48)
HEMOGLOBIN: 12.7 G/DL (ref 12–16)
LYMPHOCYTES ABSOLUTE: 2.2 K/UL (ref 1–5.1)
LYMPHOCYTES RELATIVE PERCENT: 25 %
MAGNESIUM: 2.1 MG/DL (ref 1.8–2.4)
MCH RBC QN AUTO: 29.2 PG (ref 26–34)
MCHC RBC AUTO-ENTMCNC: 33.1 G/DL (ref 31–36)
MCV RBC AUTO: 88.2 FL (ref 80–100)
METAMYELOCYTES RELATIVE PERCENT: 2 %
MONOCYTES ABSOLUTE: 0.4 K/UL (ref 0–1.3)
MONOCYTES RELATIVE PERCENT: 4 %
NEUTROPHILS ABSOLUTE: 6 K/UL (ref 1.7–7.7)
NEUTROPHILS RELATIVE PERCENT: 65 %
PDW BLD-RTO: 14.2 % (ref 12.4–15.4)
PHOSPHORUS: 3.6 MG/DL (ref 2.5–4.9)
PLATELET # BLD: 223 K/UL (ref 135–450)
PLATELET SLIDE REVIEW: ADEQUATE
PMV BLD AUTO: 8.3 FL (ref 5–10.5)
POTASSIUM SERPL-SCNC: 4.5 MMOL/L (ref 3.5–5.1)
RBC # BLD: 4.35 M/UL (ref 4–5.2)
RBC # BLD: NORMAL 10*6/UL
SLIDE REVIEW: ABNORMAL
SODIUM BLD-SCNC: 137 MMOL/L (ref 136–145)
WBC # BLD: 8.8 K/UL (ref 4–11)

## 2020-12-29 PROCEDURE — 6360000002 HC RX W HCPCS: Performed by: INTERNAL MEDICINE

## 2020-12-29 PROCEDURE — 2580000003 HC RX 258: Performed by: INTERNAL MEDICINE

## 2020-12-29 PROCEDURE — 86140 C-REACTIVE PROTEIN: CPT

## 2020-12-29 PROCEDURE — 36415 COLL VENOUS BLD VENIPUNCTURE: CPT

## 2020-12-29 PROCEDURE — 80069 RENAL FUNCTION PANEL: CPT

## 2020-12-29 PROCEDURE — 85379 FIBRIN DEGRADATION QUANT: CPT

## 2020-12-29 PROCEDURE — 1200000000 HC SEMI PRIVATE

## 2020-12-29 PROCEDURE — 85025 COMPLETE CBC W/AUTO DIFF WBC: CPT

## 2020-12-29 PROCEDURE — 6370000000 HC RX 637 (ALT 250 FOR IP): Performed by: INTERNAL MEDICINE

## 2020-12-29 PROCEDURE — 94761 N-INVAS EAR/PLS OXIMETRY MLT: CPT

## 2020-12-29 PROCEDURE — 6370000000 HC RX 637 (ALT 250 FOR IP): Performed by: PHYSICIAN ASSISTANT

## 2020-12-29 PROCEDURE — 83735 ASSAY OF MAGNESIUM: CPT

## 2020-12-29 PROCEDURE — 92526 ORAL FUNCTION THERAPY: CPT

## 2020-12-29 RX ORDER — LANOLIN ALCOHOL/MO/W.PET/CERES
3 CREAM (GRAM) TOPICAL NIGHTLY PRN
Status: DISCONTINUED | OUTPATIENT
Start: 2020-12-29 | End: 2020-12-31 | Stop reason: HOSPADM

## 2020-12-29 RX ADMIN — DOCUSATE SODIUM 100 MG: 100 CAPSULE ORAL at 08:29

## 2020-12-29 RX ADMIN — LEVOTHYROXINE SODIUM 100 MCG: 0.1 TABLET ORAL at 05:42

## 2020-12-29 RX ADMIN — DEXAMETHASONE SODIUM PHOSPHATE 10 MG: 10 INJECTION, SOLUTION INTRAMUSCULAR; INTRAVENOUS at 11:37

## 2020-12-29 RX ADMIN — ENOXAPARIN SODIUM 70 MG: 80 INJECTION SUBCUTANEOUS at 20:18

## 2020-12-29 RX ADMIN — Medication 10 ML: at 20:18

## 2020-12-29 RX ADMIN — Medication 3 MG: at 23:50

## 2020-12-29 RX ADMIN — ENOXAPARIN SODIUM 70 MG: 80 INJECTION SUBCUTANEOUS at 08:29

## 2020-12-29 RX ADMIN — Medication 10 ML: at 11:37

## 2020-12-29 ASSESSMENT — PAIN SCALES - GENERAL
PAINLEVEL_OUTOF10: 0
PAINLEVEL_OUTOF10: 0

## 2020-12-29 NOTE — PROGRESS NOTES
[facility]    12/29/2020 9:19 AM        Name: Michael Monique .               Admitted: 12/22/2020  Primary Care Provider: Marvel Manning MD (Tel: [phone])            Subjective:    Feeling better shortness of breath is improving however with any exertion desats to low 80s no chest pian or nausewa  Current Medications      dexamethasone (PF) (DECADRON) injection 10 mg, Q24H      morphine (PF) injection 2 mg, Q4H PRN      enoxaparin (LOVENOX) injection 70 mg, BID      bisacodyl (DULCOLAX) EC tablet 5 mg, Daily PRN      busPIRone (BUSPAR) tablet 5 mg, TID PRN      docusate sodium (COLACE) capsule 100 mg, Daily      levothyroxine (SYNTHROID) tablet 100 mcg, Daily      sodium chloride flush 0.9 % injection 10 mL, 2 times per day      sodium chloride flush 0.9 % injection 10 mL, PRN      promethazine (PHENERGAN) tablet 12.5 mg, Q6H PRN    Or      ondansetron (ZOFRAN) injection 4 mg, Q6H PRN      polyethylene glycol (GLYCOLAX) packet 17 g, Daily PRN      acetaminophen (TYLENOL) tablet 650 mg, Q6H PRN    Or      acetaminophen (TYLENOL) suppository 650 mg, Q6H PRN      albuterol sulfate  (90 Base) MCG/ACT inhaler 2 puff, Q6H PRN        Objective:  BP (!) 151/71   Pulse 76   Temp 97.9 °F (36.6 °C) (Oral)   Resp 16   Ht 5' 7\" (1.702 m)   Wt 152 lb (68.9 kg)   SpO2 90%   BMI 23.81 kg/m²     Intake/Output Summary (Last 24 hours) at 12/29/2020 0919  Last data filed at 12/29/2020 0519  Gross per 24 hour   Intake 240 ml   Output 1330 ml   Net -1090 ml      Wt Readings from Last 3 Encounters:   12/29/20 152 lb (68.9 kg)   09/21/20 166 lb 2 oz (75.4 kg)   09/15/20 160 lb (72.6 kg)       Patient examined from window as covid 19 pna  aaox3 not tachypnic  No accesory muscle usage able to talk in full sentances no gross focal deficts      Labs and Tests:  CBC:   Recent Labs     12/27/20  0845 12/28/20

## 2020-12-29 NOTE — PROGRESS NOTES
Nutrition Assessment     Type and Reason for Visit: Initial(LOS assessment)    Nutrition Recommendations/Plan:   No nutrition related recommendations at this time     Nutrition Assessment:  Pt assessed for nutrition risk. Pt consuming % of meals on dysphagia soft and bite-sized diet. Deemed to be at low nutrition risk at this time. Will continue to monitor for changes in status. Malnutrition Assessment:  Malnutrition Status: No malnutrition    Nutrition Related Findings: Non-pitting BLE edema. Current Nutrition Therapies:    DIET DYSPHAGIA SOFT AND BITE-SIZED;     Anthropometric Measures:  · Height: 5' 7\" (170.2 cm)  · Current Body Wt: 152 lb (68.9 kg)   · BMI: 23.8    Nutrition Diagnosis:   No nutrition diagnosis at this time    Nutrition Interventions:   Food and/or Nutrient Delivery:  Continue Current Diet  Nutrition Education/Counseling:  Education not indicated   Coordination of Nutrition Care:  Continue to monitor while inpatient    Goals:  Pt will continue to consume at least 50% of meals       Nutrition Monitoring and Evaluation:   Behavioral-Environmental Outcomes:  None Identified   Food/Nutrient Intake Outcomes:  Food and Nutrient Intake  Physical Signs/Symptoms Outcomes:  None Identified     Discharge Planning:    No discharge needs at this time     Electronically signed by Antelmo Haley RD, PENNIE on 12/29/20 at 12:33 PM EST    Contact: [phone]

## 2020-12-29 NOTE — PROGRESS NOTES
Anaid Ramsey 89  PT/OT evaluations on hold today. Pt had a near syncopal episode with nursing this morning while toileting. Pt had a fall earlier this admission due to syncope. Pt is completing AROM of UEs and LEs in the bed. PT educated pt on lying on her side or prone if possible. PT/OT will follow-up with this pt as the schedule allows. Thanks.   Rach Jarvis, PT DPT [id number]   Mario Escobar, OTR/L [id number]

## 2020-12-29 NOTE — PROGRESS NOTES
Speech Language Pathology  Dysphagia Treatment Note    Name:  Raghavendra Boone  :   1935  Medical Diagnosis:  Shortness of breath [R06.02]  Treatment Diagnosis: Oropharyngeal Dysphagia  Pain level: no c/o pain    Current Diet Level: Dysphagia III Soft and Bite sized, thin liquids  Tolerance of Current Diet Level: Per RN/Pt, grossly tolerating current diet without clinical s/s of aspiration    Assessment of Texture Tolerance:  -Impressions: Pt seen upright in bed, pleasant and agreeable to PO trials. RN ok'd entry. Pt seen on 3L O2 at 95%. Pt reports eating saltine crackers and fruit with no difficulty. Pt given PO trials of peaches and presents with adequate mastication, oral clearance, a-p bolus propulsion and initiation of swallow. No throat clearing/coughing/choking noted. Vitals remained stable and pt's vocal quality remained clear. Edu pt re: safe swallow strategies (e.g., small bites/sips, upright for all PO, slow rate). Pt v/u and reports cutting up foods and softening meats in crockpot at home. Recommending upgrade to regular solids and continue thin liquids and meds, as tolerated. Pt v/u and in agreement. Diet and Treatment Recommendations: Regular solids with thin liquids; Meds as tolerated     Strategies: 90 degree positioning with all p.o. intake; small bites/sips; alternate textures through meal; reduce rate of intake     Treatment/Goals: Speech therapy for dysphagia tx 1-2x follow-up to ensure diet tolerance     ST.) Pt will tolerate recommended diet without s/s of aspiration (ongoing 2020)  2.) If clinical symptoms of penetration/aspiration continue to be noted,Pt will tolerate MBS to r/o aspiration and determine appropriate diet/liquid level. (ongoing 2020)      Plan:  Continued daily Dysphagia treatment with goals per plan of care.     Patient/Family Education:Education given to the Pt and nurse, who verbalized understanding

## 2020-12-29 NOTE — PROGRESS NOTES
[facility]    12/29/2020 7:55 AM        Name: Meliton Sharp .               Admitted: 12/22/2020  Primary Care Provider: Beti Simmons MD (Tel: [phone])            Subjective:    Feeling much better down to 2L o2 no chest pain or nausea  Current Medications      dexamethasone (PF) (DECADRON) injection 10 mg, Q24H      morphine (PF) injection 2 mg, Q4H PRN      enoxaparin (LOVENOX) injection 70 mg, BID      bisacodyl (DULCOLAX) EC tablet 5 mg, Daily PRN      busPIRone (BUSPAR) tablet 5 mg, TID PRN      docusate sodium (COLACE) capsule 100 mg, Daily      levothyroxine (SYNTHROID) tablet 100 mcg, Daily      sodium chloride flush 0.9 % injection 10 mL, 2 times per day      sodium chloride flush 0.9 % injection 10 mL, PRN      promethazine (PHENERGAN) tablet 12.5 mg, Q6H PRN    Or      ondansetron (ZOFRAN) injection 4 mg, Q6H PRN      polyethylene glycol (GLYCOLAX) packet 17 g, Daily PRN      acetaminophen (TYLENOL) tablet 650 mg, Q6H PRN    Or      acetaminophen (TYLENOL) suppository 650 mg, Q6H PRN      albuterol sulfate  (90 Base) MCG/ACT inhaler 2 puff, Q6H PRN        Objective:  BP (!) 151/71   Pulse 76   Temp 97.9 °F (36.6 °C) (Oral)   Resp 16   Ht 5' 7\" (1.702 m)   Wt 152 lb (68.9 kg)   SpO2 91%   BMI 23.81 kg/m²     Intake/Output Summary (Last 24 hours) at 12/29/2020 0755  Last data filed at 12/29/2020 0519  Gross per 24 hour   Intake 480 ml   Output 1330 ml   Net -850 ml      Wt Readings from Last 3 Encounters:   12/29/20 152 lb (68.9 kg)   09/21/20 166 lb 2 oz (75.4 kg)   09/15/20 160 lb (72.6 kg)       Patient examined from window as covid 19 pna  aaox3 not tachypnic  No accesory muscle usage able to talk in full sentances no gross focal deficts      Labs and Tests:  CBC:   Recent Labs     12/27/20  0845 12/28/20  0521 12/29/20  0437   WBC 11.0 9.2 8.8   HGB 13.3 12.2 12.7

## 2020-12-29 NOTE — ADT AUTH CERT
Utilization Reviews       Pneumonia - Care Day 8 (12/29/2020) by Dionicio Enrique RN       Review Status Review Entered   Completed 12/29/2020 11:10      Criteria Review      Care Day: 8 Care Date: 12/29/2020 Level of Care: Inpatient Floor    Guideline Day 3    Clinical Status    (X) * Hemodynamic stability    (X) * Afebrile or temperature acceptable for next level of care    12/29/2020 11:10 AM EST by Brijesh Sylvester      T 97.9    (X) * Tachypnea absent    12/29/2020 11:10 AM EST by Brijesh Sylvester      Resp 16    ( ) * Hypoxemia absent    (X) * Mental status at baseline    12/29/2020 11:10 AM EST by Brijesh Sylvester      A&O    ( ) * Antibiotic regimen acceptable for next level of care    ( ) * Discharge plans and education understood    Activity    ( ) * Ambulatory or acceptable for next level of care    Routes    (X) * Oral hydration, medications, and diet    12/29/2020 11:10 AM EST by Brijesh Sylvester      noted    Interventions    ( ) * Oxygen absent or at baseline need    ( ) * Isolation not indicated, or is performable at next level of care    (X) WBC    12/29/2020 11:10 AM EST by Brijesh Sylvester      WBC 8.8    * Milestone   Additional Notes   12/29/20:  HOSPITALIST PROGRESS NOTE;   Subjective:     Feeling better shortness of breath is improving however with any exertion desats to low 80s no chest pian or nausea   Objective:   BP (!) 151/71   Pulse 76   Temp 97.9 °F (36.6 °C) (Oral)   Resp 16   Ht 5' 7\" (1.702 m)   Wt 152 lb (68.9 kg)   SpO2 90%   BMI 23.81 kg/m²        Intake/Output Summary (Last 24 hours) at 12/29/2020 0919   Last data filed at 12/29/2020 0519   Gross per 24 hour   Intake 240 ml   Output 1330 ml   Net -1090 ml       Wt Readings from Last 3 Encounters:   12/29/20 152 lb (68.9 kg)   09/21/20 166 lb 2 oz (75.4 kg)   09/15/20 160 lb (72.6 kg)           Patient examined from window as covid 19 pna     Diet: DIET DYSPHAGIA SOFT AND BITE-SIZED;    Code:Full Code   DVT PPXlovenox   Disposition home hopefully in 2-3 days      ACTIVE MEDS: Scheduled Meds:·  dexamethasone, 10 mg, Intravenous, Q24H   ·  enoxaparin, 1 mg/kg, Subcutaneous, BID   ·  docusate sodium, 100 mg, Oral, Daily   ·  levothyroxine, 100 mcg, Oral, Daily   ·  sodium chloride flush, 10 mL, Intravenous, 2 times per day               Pneumonia - Care Day 5 (12/26/2020) by Jemima Steiner RN       Review Status Review Entered   Completed 12/29/2020 10:58      Criteria Review      Care Day: 5 Care Date: 12/26/2020 Level of Care: Inpatient Floor    Guideline Day 3    Clinical Status    (X) * Hemodynamic stability    12/29/2020 10:58 AM EST by Ritchie Gutierrez      VSS    (X) * Afebrile or temperature acceptable for next level of care    12/29/2020 10:58 AM EST by Ritchie Gutierrez      T 98.7    (X) * Tachypnea absent    12/29/2020 10:58 AM EST by Ritchie Gutierrez      Resp 16-18    ( ) * Hypoxemia absent    (X) * Mental status at baseline    12/29/2020 10:58 AM EST by Ritchie Gutierrez      A&O    ( ) * Antibiotic regimen acceptable for next level of care    ( ) * Discharge plans and education understood    Activity    ( ) * Ambulatory or acceptable for next level of care    Routes    (X) * Oral hydration, medications, and diet    12/29/2020 10:58 AM EST by Ritchie Gutierrez      noted    Interventions    ( ) * Oxygen absent or at baseline need    ( ) * Isolation not indicated, or is performable at next level of care    (X) WBC    12/29/2020 10:58 AM EST by Ritchie Gutierrez      WBC 7.3    * Milestone   Additional Notes   12/26/20:  HOSPITALIST PROGRESS NOTE;   Subjective:     Shortness of breath about the same feels a little bit weaker today no nausea vomiting or diarrhea   Reviewed interval ancillary notes   Objective: /75   Pulse 90   Temp 97.8 °F (36.6 °C) (Oral)   Resp 18   Ht 5' 7\" (1.702 m)   Wt 155 lb 3.2 oz (70.4 kg)   SpO2 93%   BMI 24.31 kg/m²        Intake/Output Summary (Last 24 hours) at 12/26/2020 1117   Last data filed at 12/26/2020 0645   Gross per 24 hour   Intake -   Output 600 ml   Net -600 ml       Wt Readings from Last 3 Encounters:   12/26/20 155 lb 3.2 oz (70.4 kg)   09/21/20 166 lb 2 oz (75.4 kg)   09/15/20 160 lb (72.6 kg)           Patient examined from window as covid 19 pna   aaox3 not tachypnic  No accesory muscle usage able to talk in full sentances no gross focal deficts           Labs and Tests:   CBC:    Recent Labs     12/24/20   0508 12/25/20   0604 12/26/20   0625   WBC 8.8 7.4 7.3   HGB 13.3 13.3 13.1    254 207       BMP:     Recent Labs     12/24/20   0508 12/25/20   0604 12/26/20   0625    138 137   K 4.0 3.9 4.4    103 103   CO2 22 27 27   BUN 15 16 14   CREATININE 0.5* 0.6 0.5*   GLUCOSE 118* 96 112*       Hepatic:    No results for input(s): AST, ALT, ALB, BILITOT, ALKPHOS in the last 72 hours. XR SHOULDER RIGHT (MIN 2 VIEWS)   Final Result   Negative for fracture           XR SHOULDER LEFT (MIN 2 VIEWS)   Final Result   No acute osseous abnormality of the left shoulder.           CT HEAD WO CONTRAST   Final Result   No acute intracranial abnormality.           CT CHEST PULMONARY EMBOLISM W CONTRAST   Final Result   No evidence of pulmonary embolism.       Scattered areas of patchy and ground-glass opacities are noted throughout the   bilateral lungs.  These are nonspecific but could indicate an atypical/viral   pneumonia, inclusive of COVID-19.       Mild anterior compression of the T5 vertebral body with about 25% anterior   height loss, this is age indeterminate but is probably chronic in nature.       Small hiatal hernia.           XR CHEST PORTABLE   Final Result   Patchy areas of airspace disease within both lungs, most compatible with pneumonia.  Given the features, atypical pneumonia would be favored.                   Recent imaging reviewed       Problem List   Active Problems:     Shortness of breath   Resolved Problems:     * No resolved hospital problems. *           Assessment & Plan:    Acute Hypoxic respiratory failure secondary to COVID-19 pneumonia and possible bacterial pneumonia   - increase decadron to 20mg,    - ddimer elevated, increase lovenox to 1mg/kg bid   - wean o2 as toelrated       Hypothyroidism: home meds       Diet: DIET DYSPHAGIA SOFT AND BITE-SIZED; Code:Full Code   DVT PPXlovenox   Disposition home hopefully in 4- 5days days      Oxygen at 3L/m NC   D-DIMER 4917       CT HEAD:    No acute intracranial abnormality. LEFT SHOULDER XR: No acute osseous abnormality of the left shoulder.    RIGHT SHOULDER XR: Negative for fracture      ACTIVE MEDS: Decadron 10mg IV q 24hr   Colace 100mg po daily   Lovenox 70mg sq BID   Synthroid 100mcg po daily   Tylenol 650mg po prn x3   Dulcolax 5mg po prn x1   Buspar 5mg po prn x2

## 2020-12-30 PROBLEM — J12.82 PNEUMONIA DUE TO COVID-19 VIRUS: Status: ACTIVE | Noted: 2020-12-30

## 2020-12-30 PROBLEM — J96.01 ACUTE RESPIRATORY FAILURE WITH HYPOXIA (HCC): Status: ACTIVE | Noted: 2020-12-30

## 2020-12-30 PROBLEM — I95.1 ORTHOSTATIC HYPOTENSION: Status: ACTIVE | Noted: 2020-12-30

## 2020-12-30 PROBLEM — U07.1 PNEUMONIA DUE TO COVID-19 VIRUS: Status: ACTIVE | Noted: 2020-12-30

## 2020-12-30 LAB
ALBUMIN SERPL-MCNC: 2.8 G/DL (ref 3.4–5)
ANION GAP SERPL CALCULATED.3IONS-SCNC: 7 MMOL/L (ref 3–16)
BANDED NEUTROPHILS RELATIVE PERCENT: 1 % (ref 0–7)
BASOPHILS ABSOLUTE: 0 K/UL (ref 0–0.2)
BASOPHILS RELATIVE PERCENT: 0 %
BUN BLDV-MCNC: 17 MG/DL (ref 7–20)
C-REACTIVE PROTEIN: 6 MG/L (ref 0–5.1)
CALCIUM SERPL-MCNC: 9.2 MG/DL (ref 8.3–10.6)
CHLORIDE BLD-SCNC: 103 MMOL/L (ref 99–110)
CO2: 26 MMOL/L (ref 21–32)
CREAT SERPL-MCNC: <0.5 MG/DL (ref 0.6–1.2)
D DIMER: 726 NG/ML DDU (ref 0–229)
EOSINOPHILS ABSOLUTE: 0.3 K/UL (ref 0–0.6)
EOSINOPHILS RELATIVE PERCENT: 3 %
GFR AFRICAN AMERICAN: >60
GFR NON-AFRICAN AMERICAN: >60
GLUCOSE BLD-MCNC: 95 MG/DL (ref 70–99)
HCT VFR BLD CALC: 38.5 % (ref 36–48)
HEMOGLOBIN: 12.4 G/DL (ref 12–16)
LYMPHOCYTES ABSOLUTE: 1.3 K/UL (ref 1–5.1)
LYMPHOCYTES RELATIVE PERCENT: 15 %
MAGNESIUM: 2.1 MG/DL (ref 1.8–2.4)
MCH RBC QN AUTO: 28.4 PG (ref 26–34)
MCHC RBC AUTO-ENTMCNC: 32.3 G/DL (ref 31–36)
MCV RBC AUTO: 87.8 FL (ref 80–100)
METAMYELOCYTES RELATIVE PERCENT: 3 %
MONOCYTES ABSOLUTE: 0.5 K/UL (ref 0–1.3)
MONOCYTES RELATIVE PERCENT: 6 %
NEUTROPHILS ABSOLUTE: 6.4 K/UL (ref 1.7–7.7)
NEUTROPHILS RELATIVE PERCENT: 72 %
PDW BLD-RTO: 14.3 % (ref 12.4–15.4)
PHOSPHORUS: 3.6 MG/DL (ref 2.5–4.9)
PLATELET # BLD: 245 K/UL (ref 135–450)
PMV BLD AUTO: 8.1 FL (ref 5–10.5)
POTASSIUM SERPL-SCNC: 4.5 MMOL/L (ref 3.5–5.1)
RBC # BLD: 4.39 M/UL (ref 4–5.2)
SODIUM BLD-SCNC: 136 MMOL/L (ref 136–145)
WBC # BLD: 8.4 K/UL (ref 4–11)

## 2020-12-30 PROCEDURE — 92526 ORAL FUNCTION THERAPY: CPT

## 2020-12-30 PROCEDURE — 97163 PT EVAL HIGH COMPLEX 45 MIN: CPT

## 2020-12-30 PROCEDURE — 36415 COLL VENOUS BLD VENIPUNCTURE: CPT

## 2020-12-30 PROCEDURE — 86140 C-REACTIVE PROTEIN: CPT

## 2020-12-30 PROCEDURE — 2580000003 HC RX 258: Performed by: INTERNAL MEDICINE

## 2020-12-30 PROCEDURE — 97530 THERAPEUTIC ACTIVITIES: CPT

## 2020-12-30 PROCEDURE — 94761 N-INVAS EAR/PLS OXIMETRY MLT: CPT

## 2020-12-30 PROCEDURE — 83735 ASSAY OF MAGNESIUM: CPT

## 2020-12-30 PROCEDURE — 2700000000 HC OXYGEN THERAPY PER DAY

## 2020-12-30 PROCEDURE — 85025 COMPLETE CBC W/AUTO DIFF WBC: CPT

## 2020-12-30 PROCEDURE — 97167 OT EVAL HIGH COMPLEX 60 MIN: CPT

## 2020-12-30 PROCEDURE — 6360000002 HC RX W HCPCS: Performed by: INTERNAL MEDICINE

## 2020-12-30 PROCEDURE — 1200000000 HC SEMI PRIVATE

## 2020-12-30 PROCEDURE — 97535 SELF CARE MNGMENT TRAINING: CPT

## 2020-12-30 PROCEDURE — 6370000000 HC RX 637 (ALT 250 FOR IP): Performed by: PHYSICIAN ASSISTANT

## 2020-12-30 PROCEDURE — 80069 RENAL FUNCTION PANEL: CPT

## 2020-12-30 PROCEDURE — 85379 FIBRIN DEGRADATION QUANT: CPT

## 2020-12-30 PROCEDURE — 6370000000 HC RX 637 (ALT 250 FOR IP): Performed by: INTERNAL MEDICINE

## 2020-12-30 RX ORDER — DEXAMETHASONE SODIUM PHOSPHATE 10 MG/ML
6 INJECTION, SOLUTION INTRAMUSCULAR; INTRAVENOUS EVERY 24 HOURS
Status: DISCONTINUED | OUTPATIENT
Start: 2020-12-31 | End: 2020-12-31

## 2020-12-30 RX ORDER — 0.9 % SODIUM CHLORIDE 0.9 %
1000 INTRAVENOUS SOLUTION INTRAVENOUS ONCE
Status: COMPLETED | OUTPATIENT
Start: 2020-12-30 | End: 2020-12-30

## 2020-12-30 RX ADMIN — DOCUSATE SODIUM 100 MG: 100 CAPSULE ORAL at 08:29

## 2020-12-30 RX ADMIN — ENOXAPARIN SODIUM 70 MG: 80 INJECTION SUBCUTANEOUS at 08:29

## 2020-12-30 RX ADMIN — Medication 10 ML: at 20:27

## 2020-12-30 RX ADMIN — ENOXAPARIN SODIUM 70 MG: 80 INJECTION SUBCUTANEOUS at 20:26

## 2020-12-30 RX ADMIN — LEVOTHYROXINE SODIUM 100 MCG: 0.1 TABLET ORAL at 06:01

## 2020-12-30 RX ADMIN — Medication 3 MG: at 23:03

## 2020-12-30 RX ADMIN — DEXAMETHASONE SODIUM PHOSPHATE 10 MG: 10 INJECTION, SOLUTION INTRAMUSCULAR; INTRAVENOUS at 14:42

## 2020-12-30 RX ADMIN — Medication 10 ML: at 08:30

## 2020-12-30 RX ADMIN — SODIUM CHLORIDE 1000 ML: 9 INJECTION, SOLUTION INTRAVENOUS at 15:47

## 2020-12-30 ASSESSMENT — PAIN SCALES - GENERAL
PAINLEVEL_OUTOF10: 0
PAINLEVEL_OUTOF10: 0

## 2020-12-30 NOTE — PROGRESS NOTES
Occupational Therapy   Occupational Therapy Initial Assessment  Date: 2020   Patient Name: Voncile Prader  MRN: [id number]     : 1935    Date of Service: 2020    Discharge Recommendations: Voncile Prader scored a 16/24 on the AM-PAC ADL Inpatient form. Current research shows that an AM-PAC score of 18 or greater is typically associated with a discharge to the patient's home setting. Based on the patient's AM-PAC score, and their current ADL deficits, it is recommended that the patient have 2-3 sessions per week of Occupational Therapy at d/c to increase the patient's independence. At this time, this patient demonstrates the endurance and safety to discharge home with Home OT  (home vs OP services) and a follow up treatment frequency of 2-3x/wk. Please see assessment section for further patient specific details. ANTICIPATE DISCHARGE HOME IF BP STABILIZES. PATIENT HAVE 24 HOUR ASSIST. HOME HEALTH CARE: LEVEL 1 STANDARD    - Initial home health evaluation to occur within 24-48 hours, in patient home   - Therapy to evaluate with goal of regaining prior level of functioning   - Therapy to evaluate if patient has [id number] [facility] [address] needs for personal care  If patient discharges prior to next session this note will serve as a discharge summary. Please see below for the latest assessment towards goals. OT Equipment Recommendations  Equipment Needed: Yes  Mobility Devices: ADL Assistive Devices  ADL Assistive Devices: Shower Chair with back; Hand-held Shower;Grab Bars - shower    Assessment   Performance deficits / Impairments: Decreased functional mobility ; Decreased ADL status; Decreased balance;Decreased endurance  Assessment: Patient presents with the above deficits impacting occupational performance and functioning below baseline level.   REcommend skilled OT while inpatient followed by discharge home with assist. Treatment Diagnosis: Decreased ADLs, functional mobility, balance and endurance associated with SOB  Prognosis: Good  Decision Making: High Complexity  OT Education: OT Role;Plan of Care  Patient Education: Patient verbalizes independence with new learning  REQUIRES OT FOLLOW UP: Yes  Activity Tolerance  Activity Tolerance: Treatment limited secondary to medical complications (free text)  Activity Tolerance: 94% on 2 L at beginning of session supine talking with therapist, orthostatics taken in supine and sitting. Upon standing O2 sats dropped to 74% 02 increased to 4L. Patient sats increased to 88-90% after 2 min. sats dropped to 84% on 4-5 L. Positive orthostatics taken. 120/65 supine, HR 73, /73, /73 HR 91, standing BP 87/56 HR 99,  96% on 4-5L, /70 supine, HR 80. O2 3L 95, 2 L 94  Safety Devices  Safety Devices in place: Yes  Type of devices: All fall risk precautions in place;Call light within reach; Bed alarm in place; Patient at risk for falls; Left in bed;Nurse notified           Patient Diagnosis(es): The primary encounter diagnosis was Pneumonia due to COVID-19 virus. A diagnosis of Acute respiratory failure with hypoxia (HCC) was also pertinent to this visit. has a past medical history of Adhesive capsulitis of shoulder, Hypothyroidism, Osteoporosis, unspecified, Routine general medical examination at a health care facility, Seborrheic dermatitis, unspecified, and Unspecified disorder of skin and subcutaneous tissue. has a past surgical history that includes Cataract removal; Cataract removal; other surgical history; and Colonoscopy.     Treatment Diagnosis: Decreased ADLs, functional mobility, balance and endurance associated with SOB      Restrictions  HIGH FALL RISK; COVID POSITIVE ISOLATION Alexa Roberts is a 82 y.o. female with recent diagnosis of COVID-19 the Tuesday before  presents for evaluation of increasing symptoms including generalized weakness, fatigue and shortness of breath. Patient has no history of CHF, COPD or prior oxygen requirement. She states that symptoms started to get worse about 7 days ago. She denies fevers or chills. States the cough seems to have improved. She denies abdominal pain nausea vomiting or diarrhea. No urinary complaints. States that her Covid has gone through her entire family and her sister just passed away last week from this. She states that everybody else seems to be getting better but she has not is concerned for this. She has no other complaints or concerns at this time. Subjective   General  Chart Reviewed: Yes  Additional Pertinent Hx: Osteoporosis  Family / Caregiver Present: No  Diagnosis: Shortness of breath  Subjective  Subjective: Patient in bed, agreeable to therapy. Patient desires to get back on her feet. Has intermittant chest pain with activity following a prior fall. No pain at rest.  Patient Currently in Pain: Denies(at rest)  Vital Signs  Orthostatic B/P and Pulse?: Yes  Blood Pressure Lyin/65  Pulse Lyin PER MINUTE  Blood Pressure Sittin/73  Pulse Sittin PER MINUTE  Blood Pressure Standin/56  Pulse Standin PER MINUTE  Patient Currently in Pain: Denies(at rest)  Orthostatic B/P and Pulse?: Yes  Social/Functional History  Social/Functional History  Lives With: Alone  Type of Home: House  Home Layout: Two level, Performs ADL's on one level, Laundry in basement  Home Access: Stairs to enter without rails  Entrance Stairs - Number of Steps: 2 YANET  Bathroom Shower/Tub: Tub/Shower unit  Bathroom Toilet: Handicap height  ADL Assistance: Independent  Homemaking Assistance: Independent  Ambulation Assistance: Independent  Transfer Assistance: Independent  Active :  Yes Leisure & Hobbies: Listen to music and go out to eat  Additional Comments: 2 falls       Objective   Vision: Impaired  Vision Exceptions: Wears glasses at all times  Hearing: Within functional limits    Orientation  Overall Orientation Status: Within Functional Limits     Balance  Sitting Balance: Stand by assistance  Standing Balance: (CGA first time then Min A second time with posterior LOB)  Standing Balance  Time: ~1 min  x 2  Activity: standing tasks for orthostatic BP x 2 less than a minute each time. Sats on 4-5 L 74% first trial, sats 4-5 L % second trial with BP 87/56 HR 99  Comment: Patient states eyes are fading and legs are giving as she sat. Functional Mobility  Functional Mobility Comments: unable to tolerate mobility this date  ADL  Grooming: Setup  Additional Comments: Patient unable to finish cleaning up due to drop in BP and O2 sat with activity.   Tone RUE  RUE Tone: Normotonic  Tone LUE  LUE Tone: Normotonic  Coordination  Movements Are Fluid And Coordinated: Yes     Bed mobility  Supine to Sit: Stand by assistance  Sit to Supine: Stand by assistance  Transfers  Sit to stand: Minimal assistance  Stand to sit: Minimal assistance  Transfer Comments: CGA to Min A with posterior LOB     Cognition  Overall Cognitive Status: WFL        Sensation  Overall Sensation Status: WFL        LUE AROM (degrees)  LUE AROM : WFL  Right Hand AROM (degrees)  Right Hand AROM: WFL                      Plan   Plan  Times per week: 3-5 X per week  Current Treatment Recommendations: Strengthening, Endurance Training, Equipment Evaluation, Education, & procurement, Self-Care / ADL, Functional Mobility Training, Balance Training, Patient/Caregiver Education & Training, Safety Education & Training             AM-PAC Score        AM-PAC Inpatient Daily Activity Raw Score: 16 (12/30/20 1226)  AM-PAC Inpatient ADL T-Scale Score : 35.96 (12/30/20 1226)  ADL Inpatient CMS 0-100% Score: 53.32 (12/30/20 1226) ADL Inpatient CMS G-Code Modifier : CK (12/30/20 1226)    Goals  Short term goals  Time Frame for Short term goals: Discharge  Short term goal 1: Supervision with functional ADL transfers  Short term goal 2: Supervision with functional ADL mobility  Short term goal 3: LB ADLs with  supervision  Short term goal 4: UB ADLs with supervision  Short term goal 5: Patient to complete functional stance for ADL safety 3 min with CGA for ADL safety  Patient Goals   Patient goals : Go home with family       Therapy Time   Individual Concurrent Group Co-treatment   Time In 1496         Time Out 1523         Minutes 53              Timed Code Treatment Minutes:  38 Minutes    Total Treatment Minutes:  [id number] [facility], [address] [facility]  Renu Ruiz [address]

## 2020-12-30 NOTE — PROGRESS NOTES
Hospitalist Progress Note      PCP: Estephanie Saha MD    Date of Admission: 2020    LOS: 8    Chief Complaint:   Chief Complaint   Patient presents with    Shortness of Breath     SENT IN BY MD FOR LOW 02. 84% ON RA DURING TRIAGE. HAD COVID IN NOVEMBER. THINKS SISTER  FROM IT. 100% ON 2L VIA NASAL CANNULA       Case Summary:   26-year-old lady with history of hypothyroidism who presented with generalized body aches and shortness of breath, sore throat, cough and tested positive for Covid. She had self isolated and started better in 1 to care for her sister for about 2 weeks was while her sister was in hospice. Her sister passed away and the patient started feeling ill again. In the ER she was hypoxic to 84% room air requiring O2 supplementation. She was admitted for COVID-19 pneumonia with acute hypoxic respiratory failure. Admission course complicated by orthostatic hypotension      Active Hospital Problems    Diagnosis Date Noted    Orthostatic hypotension [I95.1] 2020    Acute respiratory failure with hypoxia (HCC) [J96.01] 2020    Pneumonia due to COVID-19 virus [U07.1, J12.89] 2020    Shortness of breath [R06.02] 2020    Hypercholesteremia [E78.00] 2020    Hypothyroidism [E03.9] 04/10/2013         Principal Problem:    Acute respiratory failure with hypoxia due to Pneumonia due to COVID-19 virus: She received IV steroids with slow improvement. Remains on low oxygen use at 2 L. Continues on Decadron  Continue on DVT prophylaxis  Continue to wean off oxygen as tolerated with target sats greater than 90%      Orthostatic hypotension: Patient reported feeling dizzy this morning and orthostatics done on her way positive. We will give IV fluids for hydration  Monitor orthostatics every shift    Active Problems:    Hypothyroidism:  On thyroid replacement therapy    Hypercholesteremia    Shortness of breath        Resolved Problems: * No resolved hospital problems. *          Medications:  Reviewed  Infusion Medications   Scheduled Medications    dexamethasone  10 mg Intravenous Q24H    enoxaparin  1 mg/kg Subcutaneous BID    docusate sodium  100 mg Oral Daily    levothyroxine  100 mcg Oral Daily    sodium chloride flush  10 mL Intravenous 2 times per day     PRN Meds: melatonin, morphine, bisacodyl, busPIRone, sodium chloride flush, promethazine **OR** ondansetron, polyethylene glycol, acetaminophen **OR** acetaminophen, albuterol sulfate HFA      DVT Prophylaxis: Subcut enoxaparin  Diet: DIET GENERAL;  Code Status: Full Code    Dispo: Anticipate discharge in the next 48 hrs    ____________________________________________________________________________    Subjective:   Overnight Events:   Uneventful overnight  Dizziness with standing        Physical Exam:  /72   Pulse 78   Temp 97.8 °F (36.6 °C) (Oral)   Resp 16   Ht 5' 7\" (1.702 m)   Wt 153 lb 4.8 oz (69.5 kg)   SpO2 92%   BMI 24.01 kg/m²   General appearance: No apparent distress, appears stated age and cooperative. HEENT: Normocephalic, atraumatic, MMM, No sclera icterus/conjuctival palor  Neck: Supple, no thyromegally. No jugular venous distention. Respiratory:  Normal respiratory effort. Clear to auscultation, no Rales/Wheezes/Rhonchi. Cardiovascular: S1/S2 without murmurs, rubs or gallops. RRR  Abdomen: Soft, non-tender, non-distended, bowel sounds present. Musculoskeletal: No clubbing, cyanosis or edema bilaterally. Skin: Skin color, texture, turgor normal.  No rashes or lesions.   Neurologic:  Cranial nerves: II-XII intact, KATY, No focal sensory/motor deficits  Psychiatric: Alert and oriented, thought content appropriate  Capillary Refill: Brisk,< 3 seconds   Peripheral Pulses: +2 palpable, equal bilaterally       Intake/Output Summary (Last 24 hours) at 12/30/2020 1524  Last data filed at 12/30/2020 0601  Gross per 24 hour   Intake 240 ml Output 1070 ml   Net -830 ml       Labs:   Recent Labs     12/28/20  0521 12/29/20  0437 12/30/20  0432   WBC 9.2 8.8 8.4   HGB 12.2 12.7 12.4   HCT 37.2 38.4 38.5    223 245      Recent Labs     12/28/20  0521 12/29/20  0437 12/30/20  0432    137 136   K 4.5 4.5 4.5    103 103   CO2 27 26 26   BUN 18 15 17   CREATININE 0.5* <0.5* <0.5*   CALCIUM 9.4 9.0 9.2   PHOS 3.6 3.6 3.6     No results for input(s): CKTOTAL, TROPONINI in the last 72 hours. Urinalysis:  No results found for: Caretha Munda, BACTERIA, RBCUA, BLOODU, [address] [place] [address]    Radiology:  XR SHOULDER RIGHT (MIN 2 VIEWS)   Final Result   Negative for fracture         XR SHOULDER LEFT (MIN 2 VIEWS)   Final Result   No acute osseous abnormality of the left shoulder. CT HEAD WO CONTRAST   Final Result   No acute intracranial abnormality. CT CHEST PULMONARY EMBOLISM W CONTRAST   Final Result   No evidence of pulmonary embolism. Scattered areas of patchy and ground-glass opacities are noted throughout the   bilateral lungs. These are nonspecific but could indicate an atypical/viral   pneumonia, inclusive of COVID-19. Mild anterior compression of the T5 vertebral body with about 25% anterior   height loss, this is age indeterminate but is probably chronic in nature. Small hiatal hernia. XR CHEST PORTABLE   Final Result   Patchy areas of airspace disease within both lungs, most compatible with   pneumonia. Given the features, atypical pneumonia would be favored. Roberta Santiago MD      Please excuse brevity and/or typos. This report was transcribed using voice recognition software. Every effort was made to ensure accuracy, however, inadvertent computerized transcription errors may be present.

## 2020-12-30 NOTE — PROGRESS NOTES
Physical Therapy    Facility/Department: [address] ONCOLOGY  Initial Assessment    NAME: Voncile Prader  : 1935  MRN: [id number]    Date of Service: 2020    Discharge Recommendations: Voncile Prader scored a 13/24 on the AM-PAC short mobility form. Current research shows that an AM-PAC score of 18 or greater is typically associated with a discharge to the patient's home setting. Based on the patient's AM-PAC score and their current functional mobility deficits, it is recommended that the patient have 2-3 sessions per week of Physical Therapy at d/c to increase the patient's independence. At this time, this patient demonstrates the endurance and safety to discharge home with HHPT and a follow up treatment frequency of 2-3x/wk. Please see assessment section for further patient specific details. Pt will have 24/7 assistance at home at discharge. Anticipate discharge home with assistance and HHPT evaluation once BP is stabilized. If patient discharges prior to next session this note will serve as a discharge summary. Please see below for the latest assessment towards goals. HOME HEALTH CARE: LEVEL 1 STANDARD    - Initial home health evaluation to occur within 24-48 hours, in patient home   - Therapy to evaluate with goal of regaining prior level of functioning   - Therapy to evaluate if patient has [address] needs for personal care        PT Equipment Recommendations  Equipment Needed: Yes  Mobility Devices: Shad Page: Rolling    Assessment   Body structures, Functions, Activity limitations: Decreased functional mobility ; Decreased strength;Decreased endurance;Decreased balance Assessment: Pt is limited by the above deficits and drop in BP limiting pt's functional mobility at this time. Pt is SBA for bed mobility, but requires CGA-Min A for sit to/from stand due to feeling lightheaded, and is unable to tolerate transfer to the chair today. Pt will have 24/7 assistance at home at discharge. Anticipate discharge home with assistance and HHPT evaluation once BP is stabilized. Prognosis: Good  Decision Making: High Complexity  PT Education: Goals;PT Role;Plan of Care  Patient Education: Pt verbalized understanding; pt educated regarding sidelying L and R, and prone lying if able to tolerate  Barriers to Learning: none  REQUIRES PT FOLLOW UP: Yes  Activity Tolerance  Activity Tolerance: Treatment limited secondary to medical complications (free text)  Activity Tolerance: 94% on 2 L at beginning of session supine talking with therapist, orthostatics taken in supine and sitting. Upon standing O2 sats dropped to 74% 02 increased to 4L. Patient sats increased to 88-90% after 2 min. sats dropped to 84% on 4-5 L. Positive orthostatics taken. HR 79% 96% on 4-5L, /70 supine, HR 80. O2 3L 95, 2 L 94       Patient Diagnosis(es): The primary encounter diagnosis was Pneumonia due to COVID-19 virus. A diagnosis of Acute respiratory failure with hypoxia (HCC) was also pertinent to this visit. has a past medical history of Adhesive capsulitis of shoulder, Hypothyroidism, Osteoporosis, unspecified, Routine general medical examination at a health care facility, Seborrheic dermatitis, unspecified, and Unspecified disorder of skin and subcutaneous tissue. has a past surgical history that includes Cataract removal; Cataract removal; other surgical history; and Colonoscopy.     Restrictions  Restrictions/Precautions  Restrictions/Precautions: Fall Risk, Isolation(high fall risk; Droplet Plus)  Position Activity Restriction Other position/activity restrictions: Juan Antonio Nevarez is a 82 y.o. female with recent diagnosis of COVID-19 the Tuesday before Thanksgiving presents for evaluation of increasing symptoms including generalized weakness, fatigue and shortness of breath. Patient has no history of CHF, COPD or prior oxygen requirement. She states that symptoms started to get worse about 7 days ago. She denies fevers or chills. States the cough seems to have improved. She denies abdominal pain nausea vomiting or diarrhea. No urinary complaints. States that her Covid has gone through her entire family and her sister just passed away last week from this. She states that everybody else seems to be getting better but she has not is concerned for this. She has no other complaints or concerns at this time. Vision/Hearing  Vision: Impaired  Vision Exceptions: Wears glasses at all times  Hearing: Within functional limits       Subjective  General  Chart Reviewed: Yes  Family / Caregiver Present: No  Diagnosis: SOB; COVID +  Follows Commands: Within Functional Limits  General Comment  Comments: Pt supine in bed upon arrival; agreeable to PT/OT  Subjective  Subjective: Pt denies pain at rest; some chest pain with mobility; pt reports feeling lightheaded with sitting/standing  Pain Screening  Patient Currently in Pain: Denies(at rest)     Orientation  Orientation  Overall Orientation Status: Within Functional Limits     Social/Functional History  Social/Functional History  Lives With: Alone  Type of Home: House  Home Layout: Two level, Performs ADL's on one level, Laundry in basement  Home Access: Stairs to enter without rails  Entrance Stairs - Number of Steps: 2 YANET  Bathroom Shower/Tub: Tub/Shower unit  Bathroom Toilet: Handicap height  ADL Assistance: Independent  Homemaking Assistance: Independent  Ambulation Assistance: Independent  Transfer Assistance: Independent  Active :  Yes Leisure & Hobbies: Listen to music and go out to eat  Additional Comments: 2 falls    Objective     AROM RLE (degrees)  RLE AROM: WFL  AROM LLE (degrees)  LLE AROM : WFL  Strength RLE  Strength RLE: WFL  Strength LLE  Strength LLE: WFL     Sensation  Overall Sensation Status: WFL     Bed mobility  Supine to Sit: Stand by assistance(HOB elevated)  Sit to Supine: Stand by assistance     Transfers  Sit to Stand: Contact guard assistance;Minimal Assistance(CGA first attempt; Min A second stand from bedside due to pt having posterior LOB; with RW both transfers)  Stand to sit: Contact guard assistance(pt sat after standing for <1 min due to lightheadedness and feeling like things were \"fading\", LEs feeling like they were going to give out)  Bed to Chair: (pt unable to tolerate transfer to chair today due to drop in BP)  Ambulation  Ambulation?: No     Balance  Posture: Fair(cues for thoracic extension)  Sitting - Static: Good  Standing - Static: Fair(CGA-min A with RW for <1 min)  Comments: Pt unable to tolerate dynamic activity today due to drop in BP with static standing  Exercises  Comments: discussed importance of AROM UEs and LEs; pt has been doing in the bed, and vebalized understanding     Plan   Plan  Times per week: 3-5  Times per day: Daily  Current Treatment Recommendations: Strengthening, Balance Training, Functional Mobility Training, Gait Training, Transfer Training, Patient/Caregiver Education & Training  Safety Devices  Type of devices:  All fall risk precautions in place, Call light within reach, Bed alarm in place, Gait belt, Nurse notified, Left in bed, Patient at risk for falls(VJ Barakat)      AM-PAC Score  AM-PAC Inpatient Mobility Raw Score : 13 (12/30/20 1231)  AM-PAC Inpatient T-Scale Score : 36.74 (12/30/20 1231)  Mobility Inpatient CMS 0-100% Score: 64.91 (12/30/20 1231)  Mobility Inpatient CMS G-Code Modifier : CL (12/30/20 1231)     Goals  Short term goals

## 2020-12-30 NOTE — PROGRESS NOTES
Message sent to hospitalist:    Liza Pizarro this is the pt who had orthostatic hypotension today. After talking to her daughter America Gonzalez, she states this patient has been having dizziness in early September before she got COVID. Apparently she fell down the steps in September from fainting. Just wanted you to know this information to help treat her. Thanks.

## 2020-12-30 NOTE — PROGRESS NOTES
Speech Language Pathology  Dysphagia Treatment & Discharge Note    Name:  Mei Beaver  :   1935  Medical Diagnosis:  Shortness of breath [R06.02]  Treatment Diagnosis: Oropharyngeal Dysphagia  Pain level: no c/o pain    Current Diet Level: Dysphagia III Soft and Bite sized, thin liquids  Tolerance of Current Diet Level: Per RN and pt report, pt tolerating current diet with no difficulty. Pt reports she never wears her lower dentures to eat even at home and chooses \"softer\" foods with no difficulty. Assessment of Texture Tolerance:  -Impressions: Pt positioned upright in bed on O2 via nasal cannula upon arrival.  Pt had just finished lunch meal and was agreeable to limited PO trials at this time only. Saltine crackers tolerated with adequate mastication and oral clearance. Thin liquids via straw and regular textures tolerated with no overt clinical s/s of aspiration/penetration. Recommend continue regular texture diet with pt allowance to choose \"softer\" foods. Education provided to pt regarding safe swallowing strategies. Pt verbalized understanding and agreement. Pt appears to be at baseline for diet tolerance due to nature of dentition. No further skilled dysphagia therapy indicated at this time. Please re-consult if difficulty reoccurs. Diet and Treatment Recommendations: Regular solids with thin liquids;  Meds as tolerated     Strategies: 90 degree positioning with all p.o. intake; small bites/sips; alternate textures through meal; reduce rate of intake     Treatment/Goals: Speech therapy for dysphagia tx 1-2x follow-up to ensure diet tolerance     ST.) Pt will tolerate recommended diet without s/s of aspiration (ongoing 2020)  2.) If clinical symptoms of penetration/aspiration continue to be noted,Pt will tolerate MBS to r/o aspiration and determine appropriate diet/liquid level. (ongoing 2020) Plan:  Continued daily Dysphagia treatment with goals per plan of care. Patient/Family Education:Education given to the Pt and nurse, who verbalized understanding    Discharge Recommendations:  Pt will benefit from continued skilled Speech Therapy for Dysphagia services, prior to returning home. Timed Code Treatment: 0 minutes    Total Treatment Time: 14 minutes    If patient discharges prior to next session this note will serve as a discharge summary. Signature:   Ary PIÑA  CCC-SLP S.P. [id number]  Speech-Language Pathologist

## 2020-12-30 NOTE — CARE COORDINATION
CM noted that nursing hand off states pt will go home daughter who will live with her until she is back on her feet and independent. CM called pt's daughter Lokesh Potts to discuss d/c plan. CM discussed how pt fell at home and here. Daughter states that she is going to be living with her and she also has support and help and patient will not be alone. CM discussed possible need for oxygen at d/c and that we can help provide that if needed. Also discussed home care services and CM verified address and phone number as correct. Daughter does not have a home care preference and is open to [facility] or [facility] or who can take pt's [employer]. CM called [facility] liaison [phone] and made referral. CM called Melchor Officer with [facility] [phone] to follow for possible oxygen needs. Patient will possible need home oxygen eval day of discharge. Patient will need hc orders for RN/PT/OT/aide on discharge.        Jhonatan Perea RN, BSN  [phone]

## 2020-12-30 NOTE — DISCHARGE INSTR - COC
Continuity of Care Form    Patient Name: Jeane Hopkins   :  1935  MRN:  [id number]    Admit date:  2020  Discharge date:  2020    Code Status Order: Full Code   Advance Directives:   [facility] Documentation       Date/Time Healthcare Directive Type of Healthcare Directive Copy in [address] Agent's Name Healthcare Agent's Phone Number    [phone]  No, patient does not have an advance directive for healthcare treatment -- -- -- -- --            Admitting Physician:  Benji Campo MD  PCP: Nilson Guerrero MD    Discharging Nurse: [facility] Unit/Room#: 6PN-4818/7647-31  Discharging Unit Phone Number: [phone]    Emergency Contact:   Extended Emergency Contact Information  Primary Emergency Contact: [address] [place] [address] Phone: [phone]  Relation: Child  Secondary Emergency Contact: Sushant English  Mobile Phone: [phone]  Relation: Child   needed?  No    Past Surgical History:  Past Surgical History:   Procedure Laterality Date    CATARACT REMOVAL      left    CATARACT REMOVAL      right    COLONOSCOPY      OTHER SURGICAL HISTORY      anal fissure       Immunization History:   Immunization History   Administered Date(s) Administered    Influenza Vaccine, unspecified formulation 10/10/2016    Influenza Virus Vaccine 10/24/2007, 10/03/2014, 10/10/2016    Influenza Whole 10/24/2007, 10/03/2014    Influenza, High Dose (Fluzone 65 yrs and older) 2015, 2017, 2018, 10/16/2018, 2019    Influenza, Quadv, IM, (6 mo and older Fluzone, Flulaval, Fluarix and 3 yrs and older Afluria) 2015    PPD Test 10/24/2007    Pneumococcal Conjugate 13-valent ([id number]) 2016    Pneumococcal Polysaccharide ([id number]) 10/08/2009    Tdap (Boostrix, Adacel) 2016, 09/15/2020       Active Problems:  Patient Active Problem List   Diagnosis Code    Hypothyroidism E03.9 Last Modified Barium Swallow with Video (Video Swallowing Test): not done    Treatments at the Time of Hospital Discharge:   Respiratory Treatments: ***  Oxygen Therapy:  {Therapy; copd oxygen:67855}  Ventilator:    - No ventilator support    Rehab Therapies: SN,PT,OT,ST  Weight Bearing Status/Restrictions: No weight bearing restirctions  Other Medical Equipment (for information only, NOT a DME order):  None  Other Treatments:   HOME HEALTH CARE: LEVEL 1 STANDARD     -Initial home health evaluation to occur within 24-48 hours, in patient home    -Home health agency to establish plan of care for patient over 60 day period    -Medication Reconciliation    -PCP Visit scheduled within seven days of discharge    -PT/OT to evaluate with goal of regaining prior level of functioning    -OT to evaluate if patient has [address] needs for personal care       Patient's personal belongings (please select all that are sent with patient):  None    RN SIGNATURE:  Electronically signed by Ladarius Cardenas RN on 12/31/20 at 12:19 PM EST    CASE MANAGEMENT/SOCIAL WORK SECTION    Inpatient Status Date: 12/22/2020    Readmission Risk Assessment Score:  Readmission Risk              Risk of Unplanned Readmission:        12           Discharging to Facility/ Agency   Name: Josh Wilkerson will call for Appointment  Phone: [phone]  Fax: [phone]    Dialysis Facility (if applicable)   · Name:  · Address:  · Dialysis Schedule:  · Phone:  · Fax:    / signature: Alida Alvarez RN, BSN  [phone]      PHYSICIAN SECTION    Prognosis: Good    Condition at Discharge: Stable    Rehab Potential (if transferring to Rehab): Good    Recommended Labs or Other Treatments After Discharge:   HOME PT, OT, RN Physician Certification: I certify the above information and transfer of Negro Devries  is necessary for the continuing treatment of the diagnosis listed and that she requires [address] for less 30 days.      Update Admission H&P: No change in H&P    PHYSICIAN SIGNATURE:  Electronically signed by Sebastián Ballard MD on 12/31/20 at 11:40 AM EST

## 2020-12-30 NOTE — PROGRESS NOTES
Helped patient to commode. When she stood up to wipe she felt like she was about to have a syncopal episode so I lowered her back to commode. She was able to get back to bed safely.

## 2020-12-30 NOTE — PLAN OF CARE
Problem: Falls - Risk of:  Goal: Will remain free from falls  Description: Will remain free from falls  Outcome: Ongoing  Goal: Absence of physical injury  Description: Absence of physical injury  Outcome: Ongoing     Problem: Airway Clearance - Ineffective  Goal: Achieve or maintain patent airway  Outcome: Ongoing     Problem: Gas Exchange - Impaired  Goal: Absence of hypoxia  Outcome: Ongoing  Goal: Promote optimal lung function  Outcome: Ongoing     Problem: Breathing Pattern - Ineffective  Goal: Ability to achieve and maintain a regular respiratory rate  Outcome: Ongoing     Problem:  Body Temperature -  Risk of, Imbalanced  Goal: Ability to maintain a body temperature within defined limits  Outcome: Ongoing  Goal: Will regain or maintain usual level of consciousness  Outcome: Ongoing  Goal: Complications related to the disease process, condition or treatment will be avoided or minimized  Outcome: Ongoing     Problem: Isolation Precautions - Risk of Spread of Infection  Goal: Prevent transmission of infection  Outcome: Ongoing     Problem: Nutrition Deficits  Goal: Optimize nutrtional status  Outcome: Ongoing     Problem: Risk for Fluid Volume Deficit  Goal: Maintain normal heart rhythm  Outcome: Ongoing  Goal: Maintain absence of muscle cramping  Outcome: Ongoing  Goal: Maintain normal serum potassium, sodium, calcium, phosphorus, and pH  Outcome: Ongoing     Problem: Loneliness or Risk for Loneliness  Goal: Demonstrate positive use of time alone when socialization is not possible  Outcome: Ongoing     Problem: Fatigue  Goal: Verbalize increase energy and improved vitality  Outcome: Ongoing     Problem: Patient Education: Go to Patient Education Activity  Goal: Patient/Family Education  Outcome: Ongoing     Problem: Pain:  Goal: Pain level will decrease  Description: Pain level will decrease  Outcome: Ongoing  Goal: Control of acute pain  Description: Control of acute pain  Outcome: Ongoing

## 2020-12-31 VITALS
WEIGHT: 153.3 LBS | OXYGEN SATURATION: 92 % | HEIGHT: 67 IN | BODY MASS INDEX: 24.06 KG/M2 | RESPIRATION RATE: 18 BRPM | DIASTOLIC BLOOD PRESSURE: 76 MMHG | SYSTOLIC BLOOD PRESSURE: 118 MMHG | TEMPERATURE: 98.2 F | HEART RATE: 99 BPM

## 2020-12-31 LAB
ALBUMIN SERPL-MCNC: 2.9 G/DL (ref 3.4–5)
ANION GAP SERPL CALCULATED.3IONS-SCNC: 7 MMOL/L (ref 3–16)
BANDED NEUTROPHILS RELATIVE PERCENT: 2 % (ref 0–7)
BASOPHILS ABSOLUTE: 0 K/UL (ref 0–0.2)
BASOPHILS RELATIVE PERCENT: 0 %
BUN BLDV-MCNC: 13 MG/DL (ref 7–20)
C-REACTIVE PROTEIN: 5.3 MG/L (ref 0–5.1)
CALCIUM SERPL-MCNC: 9.2 MG/DL (ref 8.3–10.6)
CHLORIDE BLD-SCNC: 106 MMOL/L (ref 99–110)
CO2: 26 MMOL/L (ref 21–32)
CREAT SERPL-MCNC: <0.5 MG/DL (ref 0.6–1.2)
D DIMER: 670 NG/ML DDU (ref 0–229)
EOSINOPHILS ABSOLUTE: 0.1 K/UL (ref 0–0.6)
EOSINOPHILS RELATIVE PERCENT: 1 %
GFR AFRICAN AMERICAN: >60
GFR NON-AFRICAN AMERICAN: >60
GLUCOSE BLD-MCNC: 96 MG/DL (ref 70–99)
HCT VFR BLD CALC: 39.1 % (ref 36–48)
HEMOGLOBIN: 12.6 G/DL (ref 12–16)
LYMPHOCYTES ABSOLUTE: 1.8 K/UL (ref 1–5.1)
LYMPHOCYTES RELATIVE PERCENT: 22 %
MAGNESIUM: 2 MG/DL (ref 1.8–2.4)
MCH RBC QN AUTO: 28.5 PG (ref 26–34)
MCHC RBC AUTO-ENTMCNC: 32.2 G/DL (ref 31–36)
MCV RBC AUTO: 88.4 FL (ref 80–100)
METAMYELOCYTES RELATIVE PERCENT: 1 %
MONOCYTES ABSOLUTE: 0.5 K/UL (ref 0–1.3)
MONOCYTES RELATIVE PERCENT: 6 %
NEUTROPHILS ABSOLUTE: 5.8 K/UL (ref 1.7–7.7)
NEUTROPHILS RELATIVE PERCENT: 68 %
PDW BLD-RTO: 14.2 % (ref 12.4–15.4)
PHOSPHORUS: 3.4 MG/DL (ref 2.5–4.9)
PLATELET # BLD: 241 K/UL (ref 135–450)
PMV BLD AUTO: 8.3 FL (ref 5–10.5)
POTASSIUM SERPL-SCNC: 4.4 MMOL/L (ref 3.5–5.1)
RBC # BLD: 4.42 M/UL (ref 4–5.2)
RBC # BLD: NORMAL 10*6/UL
SODIUM BLD-SCNC: 139 MMOL/L (ref 136–145)
WBC # BLD: 8.1 K/UL (ref 4–11)

## 2020-12-31 PROCEDURE — 85379 FIBRIN DEGRADATION QUANT: CPT

## 2020-12-31 PROCEDURE — 36415 COLL VENOUS BLD VENIPUNCTURE: CPT

## 2020-12-31 PROCEDURE — 6370000000 HC RX 637 (ALT 250 FOR IP): Performed by: INTERNAL MEDICINE

## 2020-12-31 PROCEDURE — 83735 ASSAY OF MAGNESIUM: CPT

## 2020-12-31 PROCEDURE — 6360000002 HC RX W HCPCS: Performed by: INTERNAL MEDICINE

## 2020-12-31 PROCEDURE — 86140 C-REACTIVE PROTEIN: CPT

## 2020-12-31 PROCEDURE — 85025 COMPLETE CBC W/AUTO DIFF WBC: CPT

## 2020-12-31 PROCEDURE — 2700000000 HC OXYGEN THERAPY PER DAY

## 2020-12-31 PROCEDURE — 80069 RENAL FUNCTION PANEL: CPT

## 2020-12-31 PROCEDURE — 94761 N-INVAS EAR/PLS OXIMETRY MLT: CPT

## 2020-12-31 PROCEDURE — 94680 O2 UPTK RST&XERS DIR SIMPLE: CPT

## 2020-12-31 PROCEDURE — 2580000003 HC RX 258: Performed by: INTERNAL MEDICINE

## 2020-12-31 RX ORDER — ALBUTEROL SULFATE 90 UG/1
2 AEROSOL, METERED RESPIRATORY (INHALATION) EVERY 6 HOURS PRN
Qty: 1 INHALER | Refills: 3 | Status: SHIPPED | OUTPATIENT
Start: 2020-12-31 | End: 2022-04-15

## 2020-12-31 RX ORDER — DEXAMETHASONE 4 MG/1
6 TABLET ORAL DAILY
Status: DISCONTINUED | OUTPATIENT
Start: 2020-12-31 | End: 2020-12-31 | Stop reason: HOSPADM

## 2020-12-31 RX ORDER — DEXAMETHASONE 4 MG/1
4 TABLET ORAL DAILY
Qty: 5 TABLET | Refills: 0 | Status: SHIPPED | OUTPATIENT
Start: 2021-01-01 | End: 2021-01-06

## 2020-12-31 RX ADMIN — DOCUSATE SODIUM 100 MG: 100 CAPSULE ORAL at 07:44

## 2020-12-31 RX ADMIN — ENOXAPARIN SODIUM 70 MG: 80 INJECTION SUBCUTANEOUS at 07:44

## 2020-12-31 RX ADMIN — Medication 10 ML: at 07:45

## 2020-12-31 RX ADMIN — DEXAMETHASONE SODIUM PHOSPHATE 6 MG: 10 INJECTION, SOLUTION INTRAMUSCULAR; INTRAVENOUS at 07:44

## 2020-12-31 RX ADMIN — LEVOTHYROXINE SODIUM 100 MCG: 0.1 TABLET ORAL at 06:10

## 2020-12-31 ASSESSMENT — PAIN SCALES - GENERAL
PAINLEVEL_OUTOF10: 0
PAINLEVEL_OUTOF10: 0

## 2020-12-31 NOTE — PROGRESS NOTES
Hospitalist Progress Note      PCP: Wanda Perez MD    Date of Admission: 2020    LOS: 9    Chief Complaint:   Chief Complaint   Patient presents with    Shortness of Breath     SENT IN BY MD FOR LOW 02. 84% ON RA DURING TRIAGE. HAD COVID IN NOVEMBER. THINKS SISTER  FROM IT. 100% ON 2L VIA NASAL CANNULA       Case Summary:   71-year-old lady with history of hypothyroidism who presented with generalized body aches and shortness of breath, sore throat, cough and tested positive for Covid. She had self isolated and started better in 1 to care for her sister for about 2 weeks was while her sister was in hospice. Her sister passed away and the patient started feeling ill again. In the ER she was hypoxic to 84% room air requiring O2 supplementation. She was admitted for COVID-19 pneumonia with acute hypoxic respiratory failure. Admission course complicated by orthostatic hypotension      Active Hospital Problems    Diagnosis Date Noted    Orthostatic hypotension [I95.1] 2020    Acute respiratory failure with hypoxia (HCC) [J96.01] 2020    Pneumonia due to COVID-19 virus [U07.1, J12.89] 2020    Shortness of breath [R06.02] 2020    Hypercholesteremia [E78.00] 2020    Hypothyroidism [E03.9] 04/10/2013         Principal Problem:    Acute respiratory failure with hypoxia due to Pneumonia due to COVID-19 virus: She received IV steroids with slow improvement. Remains on low oxygen use at 1-2 L. Continues on Decadron orally for 5 more days  Continue to wean off oxygen as tolerated with target sats greater than 90%  Check home OT evaluations in view of discharge planning today      Orthostatic hypotension: Patient had dizziness with positive orthostatics on 2020. She received fluid bolus with improvement of symptoms. Orthostatics done this morning negative and asymptomatic. Active Problems:    Hypothyroidism:  On thyroid replacement therapy Hypercholesteremia    Shortness of breath        Resolved Problems:    * No resolved hospital problems. *          Medications:  Reviewed  Infusion Medications   Scheduled Medications    dexamethasone  6 mg Oral Daily    enoxaparin  1 mg/kg Subcutaneous BID    docusate sodium  100 mg Oral Daily    levothyroxine  100 mcg Oral Daily    sodium chloride flush  10 mL Intravenous 2 times per day     PRN Meds: melatonin, morphine, bisacodyl, busPIRone, sodium chloride flush, promethazine **OR** ondansetron, polyethylene glycol, acetaminophen **OR** acetaminophen, albuterol sulfate HFA      DVT Prophylaxis: Subcut enoxaparin  Diet: DIET GENERAL;  Code Status: Full Code    Dispo: Anticipate discharge today    ____________________________________________________________________________    Subjective:   Overnight Events:   Uneventful overnight  No dizziness    Physical Exam:  /76   Pulse 99   Temp 98.2 °F (36.8 °C) (Oral)   Resp 18   Ht 5' 7\" (1.702 m)   Wt 153 lb 4.8 oz (69.5 kg)   SpO2 92%   BMI 24.01 kg/m²   General appearance: No apparent distress, appears stated age and cooperative. HEENT: Normocephalic, atraumatic, MMM, No sclera icterus/conjuctival palor  Neck: Supple, no thyromegally. No jugular venous distention. Respiratory:  Normal respiratory effort. Clear to auscultation, no Rales/Wheezes/Rhonchi. Cardiovascular: S1/S2 without murmurs, rubs or gallops. RRR  Abdomen: Soft, non-tender, non-distended, bowel sounds present. Musculoskeletal: No clubbing, cyanosis or edema bilaterally. Skin: Skin color, texture, turgor normal.  No rashes or lesions.   Neurologic:  Cranial nerves: II-XII intact, KATY, No focal sensory/motor deficits  Psychiatric: Alert and oriented, thought content appropriate  Capillary Refill: Brisk,< 3 seconds   Peripheral Pulses: +2 palpable, equal bilaterally       Intake/Output Summary (Last 24 hours) at 12/31/2020 1318  Last data filed at 12/31/2020 123 Gross per 24 hour   Intake    Output 1150 ml   Net -1150 ml       Labs:   Recent Labs     12/29/20  0437 12/30/20  0432 12/31/20  0513   WBC 8.8 8.4 8.1   HGB 12.7 12.4 12.6   HCT 38.4 38.5 39.1    245 241      Recent Labs     12/29/20  0437 12/30/20  0432 12/31/20  0512    136 139   K 4.5 4.5 4.4    103 106   CO2 26 26 26   BUN 15 17 13   CREATININE <0.5* <0.5* <0.5*   CALCIUM 9.0 9.2 9.2   PHOS 3.6 3.6 3.4     No results for input(s): CKTOTAL, TROPONINI in the last 72 hours. Urinalysis:  No results found for: Rosalina Brunt, BACTERIA, RBCUA, BLOODU, [address], [address] [place] [address]    Radiology:  XR SHOULDER RIGHT (MIN 2 VIEWS)   Final Result   Negative for fracture         XR SHOULDER LEFT (MIN 2 VIEWS)   Final Result   No acute osseous abnormality of the left shoulder. CT HEAD WO CONTRAST   Final Result   No acute intracranial abnormality. CT CHEST PULMONARY EMBOLISM W CONTRAST   Final Result   No evidence of pulmonary embolism. Scattered areas of patchy and ground-glass opacities are noted throughout the   bilateral lungs. These are nonspecific but could indicate an atypical/viral   pneumonia, inclusive of COVID-19. Mild anterior compression of the T5 vertebral body with about 25% anterior   height loss, this is age indeterminate but is probably chronic in nature. Small hiatal hernia. XR CHEST PORTABLE   Final Result   Patchy areas of airspace disease within both lungs, most compatible with   pneumonia. Given the features, atypical pneumonia would be favored. Roberta Syed MD      Please excuse brevity and/or typos. This report was transcribed using voice recognition software. Every effort was made to ensure accuracy, however, inadvertent computerized transcription errors may be present.

## 2020-12-31 NOTE — PROGRESS NOTES
[facility] home care referral. Spoke with pt's daughter and re: home care plan of care/services. Agreeable. Demographic's verified. Will follow for home care.

## 2020-12-31 NOTE — FLOWSHEET NOTE
12/31/20 1100 12/31/20 1105 12/31/20 1110   Vital Signs   Temp 98.2 °F (36.8 °C)  --   --    Temp Source Oral  --   --    Pulse 78 82 99   Heart Rate Source Monitor Monitor Monitor   Resp 18 18 18   /74 112/72 118/76   BP Location Right upper arm Right upper arm Right upper arm   MAP (mmHg) 91 86 90   Patient Position Supine Sitting Standing   Level of Consciousness Alert (0)  --   --    MEWS Score 1  --   --    Patient Currently in Pain Denies  --   --    Pain Assessment   Pain Assessment 0-10  --   --    Pain Level 0  --   --    Oxygen Therapy   SpO2 92 % 92 % (!) 88 %   Pulse Oximeter Device Mode Continuous Continuous Continuous   Pulse Oximeter Device Location Finger Finger Finger   O2 Device None (Room air) None (Room air) None (Room air)  (placed back on 1L)     Orthostatic vitals, patient has no complaints of lightheadedness or dizziness, just weakness in legs but patient states she has been in bed more than usual and thinks that is why.

## 2020-12-31 NOTE — PLAN OF CARE
Problem: Falls - Risk of:  Goal: Will remain free from falls  Description: Will remain free from falls  12/31/2020 0748 by Helder Benitez RN  Outcome: Met This Shift  12/30/2020 2252 by Jeannie Flores RN  Outcome: Ongoing  Goal: Absence of physical injury  Description: Absence of physical injury  12/31/2020 0748 by Helder Benitez RN  Outcome: Met This Shift  12/30/2020 2252 by Jeannie Flores RN  Outcome: Ongoing     Problem: Airway Clearance - Ineffective  Goal: Achieve or maintain patent airway  12/31/2020 0748 by Helder Benitez RN  Outcome: Met This Shift  12/30/2020 2252 by Jeannie Flores RN  Outcome: Ongoing     Problem: Breathing Pattern - Ineffective  Goal: Ability to achieve and maintain a regular respiratory rate  12/31/2020 0748 by Helder Benitez RN  Outcome: Met This Shift  12/30/2020 2252 by Jeannie Flores RN  Outcome: Ongoing     Problem:  Body Temperature -  Risk of, Imbalanced  Goal: Will regain or maintain usual level of consciousness  12/31/2020 0748 by Helder Benitez RN  Outcome: Met This Shift  12/30/2020 2252 by Jeannie Flores RN  Outcome: Ongoing     Problem: Isolation Precautions - Risk of Spread of Infection  Goal: Prevent transmission of infection  12/31/2020 0748 by Helder Benitez RN  Outcome: Met This Shift  12/30/2020 2252 by Jeannie Flores RN  Outcome: Ongoing     Problem: Nutrition Deficits  Goal: Optimize nutrtional status  12/31/2020 0748 by Helder Benitez RN  Outcome: Met This Shift  12/30/2020 2252 by Jeannie Flores RN  Outcome: Ongoing     Problem: Patient Education: Go to Patient Education Activity  Goal: Patient/Family Education  12/31/2020 0748 by Helder Benitez RN  Outcome: Met This Shift  12/30/2020 2252 by Jeannie Flores RN  Outcome: Ongoing

## 2020-12-31 NOTE — CARE COORDINATION
Marilyn Ruiz with [facility] on unit and aware that pt qualifies for home oxygen. Martine with [facility] is aware that home care orders are in chart. CM notified pt's RN Jessi Reyes and updated.        Patient discharged 12/31/2020 to home with [facility] services and [facility] oxygen services  All discharge needs met per case management    Lucy Rosario RN, BSN  [phone]

## 2020-12-31 NOTE — CARE COORDINATION
Roma/Helen received a referral to this patient for home 02 @ 3 lpm w exertion via nc. Home 02 has been arranged for delivery. Pt's daughter is aware to call [facility] [phone] to initiate setup. Portable tank has been delivered to the hospital floor. Thank you for the referral.  Electronically signed by Emerosn Tipton on 12/31/2020 at 2:37 PM  Cell ph# [phone]    NOTE: After 5:00 pm, Weekends, Holidays: Call [facility] at [phone] to coordinate delivery of home medical equipment.

## 2020-12-31 NOTE — PROGRESS NOTES
Patient being discharged, IV and tele removed. Discharge instructions reviewed and questions answered. Transport to be called when patients ride arrives.

## 2020-12-31 NOTE — DISCHARGE SUMMARY
Hospital Medicine Discharge Summary    Patient ID: Sue Jackson      Patient's PCP: Hilary Boles MD    Admit Date: 12/22/2020     Discharge Date:   12/31/2020     Admitting Physician: Marizol Burch MD     Discharge Physician: Brenda Benson MD     Discharge Diagnoses: Active Hospital Problems    Diagnosis    Orthostatic hypotension [I95.1]    Acute respiratory failure with hypoxia (HCC) [J96.01]    Pneumonia due to COVID-19 virus [U07.1, J12.89]    Shortness of breath [R06.02]    Hypercholesteremia [E78.00]    Hypothyroidism [E03.9]       The patient was seen and examined on day of discharge and this discharge summary is in conjunction with any daily progress note from day of discharge. Hospital Course: The patient is a 61-year-old lady with history of hypothyroidism who presented with generalized body aches and shortness of breath, sore throat, cough and tested positive for Covid. She had self isolated and started better in 1 to care for her sister for about 2 weeks was while her sister was in hospice. Her sister passed away and the patient started feeling ill again. In the ER she was hypoxic to 84% room air requiring O2 supplementation. She was admitted for COVID-19 pneumonia with acute hypoxic respiratory failure. Admission course complicated by orthostatic hypotension and responded to IV fluid hydration      Acute respiratory failure with hypoxia due to Pneumonia due to COVID-19 virus: She received IV steroids with slow improvement. Remains on low oxygen use at 1L and requires 3 L with activity. Continues on Decadron orally for 5 more days        Orthostatic hypotension: Patient had dizziness with positive orthostatics on 12/30/2020. She received fluid bolus with improvement of symptoms. Orthostatics done this morning negative and asymptomatic.        Active Problems:    Hypothyroidism:  On thyroid replacement therapy    Hypercholesteremia    Shortness of breath Home O2 eval   Patients SpO2 on exertion with oxygen              1 lpm = SpO2  85%               2 lpm = SpO2  88%               3 lpm = SpO2  92%           Physical Exam Performed:     /76   Pulse 99   Temp 98.2 °F (36.8 °C) (Oral)   Resp 18   Ht 5' 7\" (1.702 m)   Wt 153 lb 4.8 oz (69.5 kg)   SpO2 92%   BMI 24.01 kg/m²       General appearance:  No apparent distress, appears stated age and cooperative. HEENT:  Normal cephalic, atraumatic without obvious deformity. Pupils equal, round, and reactive to light. Extra ocular muscles intact. Conjunctivae/corneas clear. Neck: Supple, with full range of motion. No jugular venous distention. Trachea midline. Respiratory:  Normal respiratory effort. Clear to auscultation, bilaterally without Rales/Wheezes/Rhonchi. Cardiovascular:  Regular rate and rhythm with normal S1/S2 without murmurs, rubs or gallops. Abdomen: Soft, non-tender, non-distended with normal bowel sounds. Musculoskeletal:  No clubbing, cyanosis or edema bilaterally. Full range of motion without deformity. Skin: Skin color, texture, turgor normal.  No rashes or lesions. Neurologic:  Neurovascularly intact without any focal sensory/motor deficits. Cranial nerves: II-XII intact, grossly non-focal.  Psychiatric:  Alert and oriented, thought content appropriate, normal insight  Capillary Refill: Brisk,< 3 seconds   Peripheral Pulses: +2 palpable, equal bilaterally       Labs:  For convenience and continuity at follow-up the following most recent labs are provided:      CBC:    Lab Results   Component Value Date    WBC 8.1 12/31/2020    HGB 12.6 12/31/2020    HCT 39.1 12/31/2020     12/31/2020       Renal:    Lab Results   Component Value Date     12/31/2020    K 4.4 12/31/2020     12/31/2020    CO2 26 12/31/2020    BUN 13 12/31/2020    CREATININE <0.5 12/31/2020    CALCIUM 9.2 12/31/2020    PHOS 3.4 12/31/2020         Significant Diagnostic Studies    Radiology: XR SHOULDER RIGHT (MIN 2 VIEWS)   Final Result   Negative for fracture         XR SHOULDER LEFT (MIN 2 VIEWS)   Final Result   No acute osseous abnormality of the left shoulder. CT HEAD WO CONTRAST   Final Result   No acute intracranial abnormality. CT CHEST PULMONARY EMBOLISM W CONTRAST   Final Result   No evidence of pulmonary embolism. Scattered areas of patchy and ground-glass opacities are noted throughout the   bilateral lungs. These are nonspecific but could indicate an atypical/viral   pneumonia, inclusive of COVID-19. Mild anterior compression of the T5 vertebral body with about 25% anterior   height loss, this is age indeterminate but is probably chronic in nature. Small hiatal hernia. XR CHEST PORTABLE   Final Result   Patchy areas of airspace disease within both lungs, most compatible with   pneumonia. Given the features, atypical pneumonia would be favored.                 Consults:     IP CONSULT TO HOSPITALIST  IP CONSULT TO HOME CARE NEEDS    Disposition: Home  with home care    Condition at Discharge: Stable    Discharge Instructions/Follow-up:    PCP follow-up in 1 to 2 weeks    Code Status:  Full Code     Activity: activity as tolerated    Diet: regular diet      Discharge Medications:     Current Discharge Medication List           Details   albuterol sulfate  (90 Base) MCG/ACT inhaler Inhale 2 puffs into the lungs every 6 hours as needed for Wheezing  Qty: 1 Inhaler, Refills: 3      dexamethasone (DECADRON) 4 MG tablet Take 1 tablet by mouth daily for 5 days  Qty: 5 tablet, Refills: 0              Details   busPIRone (BUSPAR) 5 MG tablet Take 1 tablet by mouth 3 times daily as needed (anxiety)  Qty: 90 tablet, Refills: 2      levothyroxine (SYNTHROID) 100 MCG tablet Take 1 tablet by mouth Daily  Qty: 90 tablet, Refills: 2      aspirin 81 MG tablet Take 81 mg by mouth daily      docusate sodium (DULCOLAX) 100 MG capsule Take 100 mg by mouth daily Ibuprofen-diphenhydrAMINE HCl (ADVIL PM) 200-25 MG CAPS Take 1 tablet by mouth nightly              Time Spent on discharge is more than 30 minutes in the examination, evaluation, counseling and review of medications and discharge plan. Signed: Donnal Brunner, MD   12/31/2020      Thank you Carrie Molina MD for the opportunity to be involved in this patient's care. If you have any questions or concerns please feel free to contact me at [phone].

## 2020-12-31 NOTE — PROGRESS NOTES
Home Oxygen Evaluation                                                                                      If patient's SpO2 sat is 89% or higher on room air at rest, proceed as follows:   Patients room air at rest saturation SpO2 95%    Exert patient on room air and document below the SpO2 on exertion   Patients room air saturation SpO2 84% with exertion    If SpO2 sat of 89% or higher with room air exertion no further testing required    If SpO2 on exertion is 88% or below on room air, exert the patient on oxygen to  maintain SpO2 89% or higher and enter the liter flow and enter the SpO2 below   Patients SpO2 on exertion with oxygen   1 lpm = SpO2  85%    2 lpm = SpO2  88%    3 lpm = SpO2  92%

## 2021-01-02 ENCOUNTER — CARE COORDINATION (OUTPATIENT)
Dept: CASE MANAGEMENT | Age: 86
End: 2021-01-02

## 2021-01-02 DIAGNOSIS — U07.1 PNEUMONIA DUE TO COVID-19 VIRUS: Primary | ICD-10-CM

## 2021-01-02 DIAGNOSIS — J12.82 PNEUMONIA DUE TO COVID-19 VIRUS: Primary | ICD-10-CM

## 2021-01-02 PROCEDURE — 1111F DSCHRG MED/CURRENT MED MERGE: CPT | Performed by: FAMILY MEDICINE

## 2021-01-04 ENCOUNTER — TELEPHONE (OUTPATIENT)
Dept: FAMILY MEDICINE CLINIC | Age: 86
End: 2021-01-04

## 2021-01-04 DIAGNOSIS — S22.059A CLOSED FRACTURE OF FIFTH THORACIC VERTEBRA, UNSPECIFIED FRACTURE MORPHOLOGY, INITIAL ENCOUNTER (HCC): Primary | ICD-10-CM

## 2021-01-04 NOTE — TELEPHONE ENCOUNTER
Yas Lisa was wanting to know if Dr. Morenita Parikh would sign orders for the patinet . Patient fell while in the hospital and hit her head at [facility]. Patient has pain across her collar bone to sternum to her collar bone. Daughter states she is screaming in pain when she sits up.       She was wanting to get a order or a ct scan of her chest to make sure nothing is broke

## 2021-01-05 ENCOUNTER — TELEPHONE (OUTPATIENT)
Dept: FAMILY MEDICINE CLINIC | Age: 86
End: 2021-01-05

## 2021-01-05 DIAGNOSIS — S22.000A COMPRESSION FRACTURE OF THORACIC VERTEBRA, INITIAL ENCOUNTER, UNSPECIFIED THORACIC VERTEBRAL LEVEL: Primary | ICD-10-CM

## 2021-01-05 RX ORDER — OXYCODONE HYDROCHLORIDE AND ACETAMINOPHEN 5; 325 MG/1; MG/1
1 TABLET ORAL EVERY 4 HOURS PRN
Qty: 28 TABLET | Refills: 0 | Status: SHIPPED | OUTPATIENT
Start: 2021-01-05 | End: 2021-01-08

## 2021-01-05 NOTE — TELEPHONE ENCOUNTER
I sent a prescription for Percocet to the pharmacy. I would recommend that she take a Percocet at home and take another 1 prior to the procedure if she still have a lot of discomfort.

## 2021-01-05 NOTE — TELEPHONE ENCOUNTER
Patient would like to know if she could have a sedation to be able to do the MRI. Patient is in a bunch of pain when she lays back.   She feels like this would be easier         Please advise       Nam on raya

## 2021-01-07 ENCOUNTER — CARE COORDINATION (OUTPATIENT)
Dept: CASE MANAGEMENT | Age: 86
End: 2021-01-07

## 2021-01-07 NOTE — CARE COORDINATION
[facility] Transitions Follow Up Call    2021    Patient: [id number] [place]  Patient : 1936   MRN: [id number]  Reason for Admission: COVID+  Discharge Date: 20 RARS: Readmission Risk Score: 13         Spoke with: [address] Transitions Subsequent and Final Call    Subsequent and Final Calls  Do you have any ongoing symptoms?: No  Have your medications changed?: No  Do you have any questions related to your medications?: No  Do you currently have any active services?: Yes  Are you currently active with any services?: Home Health  Do you have any needs or concerns that I can assist you with?: No  Identified Barriers: None  Care Transitions Interventions  Other Interventions: Follow Up: Final outreach call:  Daughter reports that patient is currently working with PT and that she is doing well. She does have a follow up with PCP 21. CTN will resolve episode and remain available.     Future Appointments   Date Time Provider Meeta Chairez   2021 11:30 AM Levy Chery MD [facility]   3/12/2021  1:30 PM Levy Chery MD [facility]       Renetta Childs RN

## 2021-01-08 ENCOUNTER — OFFICE VISIT (OUTPATIENT)
Dept: FAMILY MEDICINE CLINIC | Age: 86
End: 2021-01-08
Payer: COMMERCIAL

## 2021-01-08 VITALS
HEART RATE: 88 BPM | OXYGEN SATURATION: 98 % | DIASTOLIC BLOOD PRESSURE: 70 MMHG | BODY MASS INDEX: 24.43 KG/M2 | TEMPERATURE: 96.8 F | WEIGHT: 156 LBS | SYSTOLIC BLOOD PRESSURE: 110 MMHG

## 2021-01-08 DIAGNOSIS — J12.82 PNEUMONIA DUE TO COVID-19 VIRUS: ICD-10-CM

## 2021-01-08 DIAGNOSIS — J96.01 ACUTE RESPIRATORY FAILURE WITH HYPOXIA (HCC): Primary | ICD-10-CM

## 2021-01-08 DIAGNOSIS — U07.1 PNEUMONIA DUE TO COVID-19 VIRUS: ICD-10-CM

## 2021-01-08 DIAGNOSIS — R06.02 SHORTNESS OF BREATH: ICD-10-CM

## 2021-01-08 PROCEDURE — 99495 TRANSJ CARE MGMT MOD F2F 14D: CPT | Performed by: FAMILY MEDICINE

## 2021-01-08 NOTE — PROGRESS NOTES
Subjective:      Patient ID: Matt Dennis is a 82 y.o. female. CC: Patient presents for hospital follow-up. HPI Patient presents for a follow-up on her hospital visit at [facility]. Patient was hospitalized due to pneumonia and covid-19 and hypoxia. Patient was admitted on 12/22/2020 and was discharged on 12/31/2020. Patient was discharged on 5 days of steroids and oxygen at 3 L nasal cannula. She does have an albuterol inhaler but has not been using this. She feels her breathing is improved significantly. She apparently had a fall in the hospital and there is a question whether she has a fracture of T5 most of her discomfort seems to be more in the sternal area of her chest.  The fracture cord and radiologist appear to be old. She is not take any pain medication for any of this letter and occasional Aleve. She has been able to decrease her oxygen down to 2 L nasal cannula and let her know if she can needed to continue with her oxygen. She does have a home pulse oximeter. Care Coordinator phone contact documented in [address] note for 1-2-2021.      Review of Systems   Patient Active Problem List   Diagnosis    Hypothyroidism    Osteopenia    Urinary incontinence    Macular degeneration    Hypercholesteremia    Anxiety    Shortness of breath    Orthostatic hypotension    Acute respiratory failure with hypoxia (HCC)    Pneumonia due to COVID-19 virus       Outpatient Medications Marked as Taking for the 1/8/21 encounter (Office Visit) with Beulah Guerra MD   Medication Sig Dispense Refill    albuterol sulfate  (90 Base) MCG/ACT inhaler Inhale 2 puffs into the lungs every 6 hours as needed for Wheezing 1 Inhaler 3    levothyroxine (SYNTHROID) 100 MCG tablet Take 1 tablet by mouth Daily 90 tablet 2    aspirin 81 MG tablet Take 81 mg by mouth daily      docusate sodium (DULCOLAX) 100 MG capsule Take 100 mg by mouth daily      Ibuprofen-diphenhydrAMINE HCl (ADVIL PM) 200-25 MG CAPS Take 1 tablet by mouth nightly          No Known Allergies    Social History     Tobacco Use    Smoking status: Never Smoker    Smokeless tobacco: Never Used   Substance Use Topics    Alcohol use: Yes     Comment: occasional       /70 (Site: Right Upper Arm, Position: Sitting, Cuff Size: Medium Adult)   Pulse 88   Temp 96.8 °F (36 °C) (Infrared)   Wt 156 lb (70.8 kg)   SpO2 98% Comment: 2.5 liters  BMI 24.43 kg/m²       Objective:   Physical Exam  Constitutional:       General: She is not in acute distress. HENT:      Right Ear: Tympanic membrane normal.      Left Ear: Tympanic membrane normal.      Nose: Nose normal.      Mouth/Throat:      Pharynx: Uvula midline. Neck:      Musculoskeletal: Neck supple. Cardiovascular:      Rate and Rhythm: Normal rate and regular rhythm. Pulmonary:      Effort: Pulmonary effort is normal. No respiratory distress. Breath sounds: Rhonchi present. No decreased breath sounds or wheezing. Chest:      Chest wall: Tenderness (Costochondral junction bilaterally left greater than right) present. Abdominal:      General: Abdomen is flat. Bowel sounds are normal.      Palpations: Abdomen is soft. Tenderness: There is no abdominal tenderness. There is no right CVA tenderness or left CVA tenderness. Lymphadenopathy:      Cervical: No cervical adenopathy. Neurological:      Mental Status: She is alert. Assessment:      Mao Zhang was seen today for follow-up from hospital.    Diagnoses and all orders for this visit:    Acute respiratory failure with hypoxia ([address]    Pneumonia due to COVID-19 virus    Shortness of breath            Plan:      Hospital information and chest x-ray reviewed with patient and daughter    Work issues provided to the daughter through this week    Advised that she should maintain her oxygen at 2 L nasal cannula and follow her pulse oximeter.   If she indeed finds her oxygen drops below 90% with activity she needs increased up to 3 L and if she continues to be above the 90% at 2 L she may wean it down to 1 L nasal cannula. Continue with Tylenol or Aleve for the chest wall pain. The T5 compression fracture appears to be old and was secondary to underlying osteoporosis would not pursue any additional evaluation at this time    RTC next 2 to 3 weeks    Medical decision making of moderate complexity. Please note that this chart was generated using Dragon dictation software. Although every effort was made to ensure the accuracy of this automated transcription, some errors in transcription may have occurred.

## 2021-01-21 NOTE — TELEPHONE ENCOUNTER
I would recommend that she self quarantine for the next 2 weeks. She is already been almost 2 weeks from the emergency room visit. If she was started having any kind of respiratory illness then she certainly should be tested for COVID. Canthacur Ps Duration Text (Please Remove Duration From Postcare): The patient was instructed to leave the Canthacur PS on for 6-8 hours and then wash the area well with soap and water. Add 52 Modifier (Optional): no Consent: The patient's consent was obtained including but not limited to risks of crusting, scabbing, scarring, blistering, darker or lighter pigmentary change, recurrence, incomplete removal and infection. Medical Necessity Information: It is in your best interest to select a reason for this procedure from the list below. All of these items fulfill various CMS LCD requirements except the new and changing color options. Cantharone Forte Duration Text (Please Remove Duration From Postcare): The patient was instructed to leave the Cantharone Forte on for 6-8 hours and then wash the area well with soap and water. Medical Necessity Clause: This procedure was medically necessary because the lesions that were treated were: Post-Care Instructions: I reviewed with the patient in detail post-care instructions. The patient understands that the treated areas should be washed off 6 to 8 hours after application. Curette Text: Prior to application of cantharidin the lesions were lightly pared with a curette. Cantharone Duration Text (Please Remove Duration From Postcare): The patient was instructed to leave the Cantharone on for 6-8 hours and then wash the area well with soap and water. Strength: Karli Canthacur Duration Text (Please Remove Duration From Postcare): The patient was instructed to leave the Canthacur on for 6-8 hours and then wash the area well with soap and water. Detail Level: Detailed Cantharone Plus Duration Text (Please Remove Duration From Postcare): The patient was instructed to leave the Cantharone Plus on for 6-8 hours and then wash the area well with soap and water.

## 2021-01-26 ENCOUNTER — OFFICE VISIT (OUTPATIENT)
Dept: FAMILY MEDICINE CLINIC | Age: 86
End: 2021-01-26
Payer: COMMERCIAL

## 2021-01-26 VITALS
SYSTOLIC BLOOD PRESSURE: 120 MMHG | BODY MASS INDEX: 24.17 KG/M2 | DIASTOLIC BLOOD PRESSURE: 80 MMHG | HEART RATE: 85 BPM | WEIGHT: 154.3 LBS | TEMPERATURE: 97 F | OXYGEN SATURATION: 99 %

## 2021-01-26 DIAGNOSIS — U07.1 PNEUMONIA DUE TO COVID-19 VIRUS: ICD-10-CM

## 2021-01-26 DIAGNOSIS — J12.82 PNEUMONIA DUE TO COVID-19 VIRUS: ICD-10-CM

## 2021-01-26 DIAGNOSIS — J96.01 ACUTE RESPIRATORY FAILURE WITH HYPOXIA (HCC): Primary | ICD-10-CM

## 2021-01-26 PROCEDURE — 99213 OFFICE O/P EST LOW 20 MIN: CPT | Performed by: FAMILY MEDICINE

## 2021-01-26 ASSESSMENT — PATIENT HEALTH QUESTIONNAIRE - PHQ9
2. FEELING DOWN, DEPRESSED OR HOPELESS: 0
SUM OF ALL RESPONSES TO PHQ QUESTIONS 1-9: 0
SUM OF ALL RESPONSES TO PHQ QUESTIONS 1-9: 0

## 2021-01-26 NOTE — PROGRESS NOTES
Subjective:      Patient ID: Jeane Hopkins is a 82 y.o. female. CC: Patient presents for reevaluation of the hypoxic respiratory failure secondary to Covid    HPIPatient presents for a 2 week follow-up visit in accompaniment of daughter. . Patient states her breathing seems to getting better. She been working with physical therapy and with her exercise she is finding her O2 saturation is staying above 90% with her oxygen on at 2 L. If she leaves her oxygen off O2 sats dropped down to below 90% but seem to recover quickly. She does wear oxygen at nighttime. She has feeling stronger in general.    Review of Systems    No Known Allergies     Objective:   Physical Exam  Constitutional:       General: She is not in acute distress. HENT:      Right Ear: Tympanic membrane normal.      Left Ear: Tympanic membrane normal.      Nose: Nose normal.      Mouth/Throat:      Pharynx: Uvula midline. Neck:      Musculoskeletal: Neck supple. Cardiovascular:      Rate and Rhythm: Normal rate and regular rhythm. Pulmonary:      Effort: Pulmonary effort is normal.      Breath sounds: Rhonchi present. No decreased breath sounds or wheezing. Lymphadenopathy:      Cervical: No cervical adenopathy. Neurological:      Mental Status: She is alert. Assessment:      Tejal Alvarado was seen today for 2 week follow-up. Diagnoses and all orders for this visit:    Acute respiratory failure with hypoxia ([address]    Pneumonia due to COVID-19 virus            Plan:      Recommend that she continue using the oxygen but she could try decreasing down to 1-1 and half liters. She needs to use her pulse ox and if her O2 sats stays above 90% she is fine but she finds it consistently is below 90% with activity she needs to wear oxygen especially her activities. Certainly encouraged to continue with the oxygen at nighttime. RTC in the next 2 to 3 weeks    Total time of consultation 20 minutes.     Please note that this chart was generated using Dragon dictation software. Although every effort was made to ensure the accuracy of this automated transcription, some errors in transcription may have occurred.

## 2021-02-26 ENCOUNTER — OFFICE VISIT (OUTPATIENT)
Dept: FAMILY MEDICINE CLINIC | Age: 86
End: 2021-02-26
Payer: COMMERCIAL

## 2021-02-26 VITALS
HEART RATE: 83 BPM | SYSTOLIC BLOOD PRESSURE: 132 MMHG | BODY MASS INDEX: 24.67 KG/M2 | TEMPERATURE: 97 F | OXYGEN SATURATION: 93 % | RESPIRATION RATE: 12 BRPM | WEIGHT: 157.5 LBS | DIASTOLIC BLOOD PRESSURE: 68 MMHG

## 2021-02-26 DIAGNOSIS — R06.02 SHORTNESS OF BREATH: ICD-10-CM

## 2021-02-26 DIAGNOSIS — J96.01 ACUTE RESPIRATORY FAILURE WITH HYPOXIA (HCC): Primary | ICD-10-CM

## 2021-02-26 PROBLEM — J12.82 PNEUMONIA DUE TO COVID-19 VIRUS: Status: RESOLVED | Noted: 2020-12-30 | Resolved: 2021-02-26

## 2021-02-26 PROBLEM — U07.1 PNEUMONIA DUE TO COVID-19 VIRUS: Status: RESOLVED | Noted: 2020-12-30 | Resolved: 2021-02-26

## 2021-02-26 PROCEDURE — 99214 OFFICE O/P EST MOD 30 MIN: CPT | Performed by: FAMILY MEDICINE

## 2021-02-26 ASSESSMENT — PATIENT HEALTH QUESTIONNAIRE - PHQ9
SUM OF ALL RESPONSES TO PHQ QUESTIONS 1-9: 0
SUM OF ALL RESPONSES TO PHQ QUESTIONS 1-9: 0
SUM OF ALL RESPONSES TO PHQ9 QUESTIONS 1 & 2: 0
2. FEELING DOWN, DEPRESSED OR HOPELESS: 0

## 2021-02-26 NOTE — PROGRESS NOTES
Subjective:      Patient ID: Chelsey Granda is a 82 y.o. female. CC: Patient presents for reevaluation of ARDS secondary to Covid    HPI pt is here for a 2 week follow up. She has noted that as long she is quiet and does not over exert her oxygen levels stay above 90%. She found the other day carrying groceries up from the basement on 3 trips that her oxygen saturation was down to 88%. Normally she has been able to walk outside and collect her mail without having any low oxygen levels. She went to the store today and again her oxygen saturations stayed good. In the morning when she checks her oxygen it is always above 90%. She does have some concerns about doing without her oxygen and finds the bigger tanks that she is currently has at home very cumbersome. She would like a smaller tank.     Review of Systems  Patient Active Problem List   Diagnosis    Hypothyroidism    Osteopenia    Urinary incontinence    Macular degeneration    Hypercholesteremia    Anxiety    Shortness of breath    Orthostatic hypotension    Acute respiratory failure with hypoxia (HCC)    Pneumonia due to COVID-19 virus       Outpatient Medications Marked as Taking for the 2/26/21 encounter (Office Visit) with Shahbaz Valverde MD   Medication Sig Dispense Refill    albuterol sulfate  (90 Base) MCG/ACT inhaler Inhale 2 puffs into the lungs every 6 hours as needed for Wheezing 1 Inhaler 3    levothyroxine (SYNTHROID) 100 MCG tablet Take 1 tablet by mouth Daily 90 tablet 2    aspirin 81 MG tablet Take 81 mg by mouth daily      docusate sodium (DULCOLAX) 100 MG capsule Take 100 mg by mouth daily      Ibuprofen-diphenhydrAMINE HCl (ADVIL PM) 200-25 MG CAPS Take 1 tablet by mouth nightly          No Known Allergies    Social History     Tobacco Use    Smoking status: Never Smoker    Smokeless tobacco: Never Used   Substance Use Topics    Alcohol use: Yes     Comment: occasional       /68 (Site: Right Upper Arm, Position: Sitting, Cuff Size: Medium Adult)   Pulse 83   Temp 97 °F (36.1 °C)   Resp 12   Wt 157 lb 8 oz (71.4 kg)   SpO2 93%   BMI 24.67 kg/m²     Objective:   Physical Exam  Constitutional:       General: She is not in acute distress. Appearance: She is well-developed. Cardiovascular:      Rate and Rhythm: Normal rate and regular rhythm. Pulses:           Dorsalis pedis pulses are 2+ on the right side and 2+ on the left side. Posterior tibial pulses are 2+ on the right side and 2+ on the left side. Heart sounds: Normal heart sounds. No murmur. No friction rub. No gallop. Pulmonary:      Effort: Pulmonary effort is normal.      Breath sounds: Normal breath sounds. Musculoskeletal:      Right lower leg: No edema. Left lower leg: No edema. Lymphadenopathy:      Cervical: No cervical adenopathy. Neurological:      Mental Status: She is alert and oriented to person, place, and time. Sensory: Sensation is intact. Motor: Motor function is intact. Psychiatric:         Behavior: Behavior is cooperative. Assessment:      Flor Baker was seen today for 2 week follow-up. Diagnoses and all orders for this visit:    Acute respiratory failure with hypoxia (HCC)    Shortness of breath            Plan:      Clinically she obviously is improving and I would recommend that if she is walking through her store that she needs to use her oxygen. She is going to call us back with the company that is supplying her oxygen and we could then send in for smaller tanks. Certainly recommend she continue monitor pulse ox closely. We discussed not over exerting at this point of time. Plan of care discussed with daughter    RTC 2 to 3 weeks    Medical decision making of moderate complexity. Please note that this chart was generated using Dragon dictation software.  Although every effort was made to ensure the accuracy of this automated transcription, some errors in transcription may have occurred.

## 2021-03-01 ENCOUNTER — TELEPHONE (OUTPATIENT)
Dept: FAMILY MEDICINE CLINIC | Age: 86
End: 2021-03-01

## 2021-03-12 ENCOUNTER — OFFICE VISIT (OUTPATIENT)
Dept: FAMILY MEDICINE CLINIC | Age: 86
End: 2021-03-12
Payer: COMMERCIAL

## 2021-03-12 VITALS
BODY MASS INDEX: 25.89 KG/M2 | OXYGEN SATURATION: 98 % | DIASTOLIC BLOOD PRESSURE: 78 MMHG | HEIGHT: 65 IN | SYSTOLIC BLOOD PRESSURE: 132 MMHG | WEIGHT: 155.38 LBS | HEART RATE: 85 BPM | TEMPERATURE: 97.3 F | RESPIRATION RATE: 12 BRPM

## 2021-03-12 DIAGNOSIS — E03.9 HYPOTHYROIDISM, UNSPECIFIED TYPE: ICD-10-CM

## 2021-03-12 DIAGNOSIS — H35.30 MACULAR DEGENERATION OF BOTH EYES, UNSPECIFIED TYPE: ICD-10-CM

## 2021-03-12 DIAGNOSIS — M85.80 OSTEOPENIA, UNSPECIFIED LOCATION: ICD-10-CM

## 2021-03-12 DIAGNOSIS — Z00.00 ROUTINE GENERAL MEDICAL EXAMINATION AT A HEALTH CARE FACILITY: Primary | ICD-10-CM

## 2021-03-12 PROBLEM — R06.02 SHORTNESS OF BREATH: Status: RESOLVED | Noted: 2020-12-22 | Resolved: 2021-03-12

## 2021-03-12 PROBLEM — J96.01 ACUTE RESPIRATORY FAILURE WITH HYPOXIA (HCC): Status: RESOLVED | Noted: 2020-12-30 | Resolved: 2021-03-12

## 2021-03-12 PROBLEM — I95.1 ORTHOSTATIC HYPOTENSION: Status: RESOLVED | Noted: 2020-12-30 | Resolved: 2021-03-12

## 2021-03-12 PROCEDURE — G0439 PPPS, SUBSEQ VISIT: HCPCS | Performed by: FAMILY MEDICINE

## 2021-03-12 PROCEDURE — 99214 OFFICE O/P EST MOD 30 MIN: CPT | Performed by: FAMILY MEDICINE

## 2021-03-12 RX ORDER — LEVOTHYROXINE SODIUM 0.1 MG/1
100 TABLET ORAL DAILY
Qty: 90 TABLET | Refills: 3 | Status: SHIPPED | OUTPATIENT
Start: 2021-03-12 | End: 2022-03-07 | Stop reason: SDUPTHER

## 2021-03-12 ASSESSMENT — PATIENT HEALTH QUESTIONNAIRE - PHQ9
SUM OF ALL RESPONSES TO PHQ9 QUESTIONS 1 & 2: 0
SUM OF ALL RESPONSES TO PHQ QUESTIONS 1-9: 0
SUM OF ALL RESPONSES TO PHQ QUESTIONS 1-9: 0

## 2021-03-12 ASSESSMENT — LIFESTYLE VARIABLES: HOW OFTEN DO YOU HAVE A DRINK CONTAINING ALCOHOL: 0

## 2021-03-12 NOTE — PROGRESS NOTES
 Routine general medical examination at a health care facility     Seborrheic dermatitis, unspecified     Unspecified disorder of skin and subcutaneous tissue        Past Surgical History:   Procedure Laterality Date    CATARACT REMOVAL      left    CATARACT REMOVAL      right    COLONOSCOPY      OTHER SURGICAL HISTORY      anal fissure       Family History   Problem Relation Age of Onset    Hypertension Sister     Diabetes Sister     Stroke Father 76    High Blood Pressure Father     Heart Disease Mother [de-identified]    Cancer Paternal Aunt         breast       CareTeam (Including outside providers/suppliers regularly involved in providing care):   Patient Care Team:  Nilson Guerrero MD as PCP - General  Nilson Guerrero MD as PCP - [facility]  Wendy Cormier MD as Consulting Physician (Ophthalmology)    Wt Readings from Last 3 Encounters:   03/12/21 155 lb 6 oz (70.5 kg)   02/26/21 157 lb 8 oz (71.4 kg)   01/26/21 154 lb 4.8 oz (70 kg)     Vitals:    03/12/21 1340   BP: 132/78   Site: Right Upper Arm   Position: Sitting   Cuff Size: Medium Adult   Pulse: 85   Resp: 12   Temp: 97.3 °F (36.3 °C)   SpO2: 98%   Weight: 155 lb 6 oz (70.5 kg)   Height: 5' 5\" (1.651 m)     Body mass index is 25.86 kg/m². Based upon direct observation of the patient, evaluation of cognition reveals recent and remote memory intact.     General Appearance: alert and oriented to person, place and time, well-developed and well-nourished, in no acute distress  Skin: no suspicious lesions noted  Neck: neck supple and non tender without mass, no thyromegaly or thyroid nodules, no cervical lymphadenopathy   Pulmonary/Chest: clear to auscultation bilaterally- no wheezes, rales or rhonchi, normal air movement, no respiratory distress  Cardiovascular: normal rate, regular rhythm, normal S1 and S2, no murmurs, no gallops, intact distal pulses and no carotid bruits  Abdomen: soft, non-tender, non-distended, normal bowel sounds, no masses or organomegaly  Extremities: no edema  Musculoskeletal: normal range of motion, no joint swelling, deformity or tenderness  Neurologic: no cranial nerve deficit, gait and coordination normal and speech normal    Patient's complete Health Risk Assessment and screening values have been reviewed and are found in Flowsheets. The following problems were reviewed today and where indicated follow up appointments were made and/or referrals ordered. Positive Risk Factor Screenings with Interventions:          General Health and ACP:  General  In general, how would you say your health is?: Good  In the past 7 days, have you experienced any of the following?  New or Increased Pain, New or Increased Fatigue, Loneliness, Social Isolation, Stress or Anger?: None of These  Do you get the social and emotional support that you need?: Yes  Do you have a Living Will?: Yes  Advance Directives     Power of  Living Will ACP-Advance Directive ACP-Power of     Not on File Not on File Not on File Not on File      General Health Risk Interventions:  · Fatigue: regular exercise recommended- 3-5 times per week, 30-45 minutes per session    Health Habits/Nutrition:  Health Habits/Nutrition  Do you exercise for at least 20 minutes 2-3 times per week?: (!) No  Have you lost any weight without trying in the past 3 months?: No  Do you eat only one meal per day?: No  Have you seen the dentist within the past year?: Appointment is scheduled  Body mass index: (!) 25.85  Health Habits/Nutrition Interventions:  · Inadequate physical activity:  patient agrees to exercise for at least 150 minutes/week       Personalized Preventive Plan   Current Health Maintenance Status  Immunization History   Administered Date(s) Administered    Influenza Vaccine, unspecified formulation 10/10/2016    Influenza Virus Vaccine 10/24/2007, 10/03/2014, 10/10/2016    Influenza Whole 10/24/2007, 10/03/2014    Influenza, High Dose (Fluzone 65 yrs and older) 09/29/2015, 09/21/2017, 09/21/2018, 10/16/2018, 09/24/2019    Influenza, Paulalfonsoe Felty, IM, (6 mo and older Fluzone, Flulaval, Fluarix and 3 yrs and older Afluria) 09/29/2015    Influenza, Quadv, adjuvanted, 65 yrs +, IM, PF (Fluad) 10/21/2020    PPD Test 10/24/2007    Pneumococcal Conjugate 13-valent ([id number]) 11/09/2016    Pneumococcal Polysaccharide ([id number]) 10/08/2009    Tdap (Boostrix, Adacel) 11/09/2016, 09/15/2020        Health Maintenance   Topic Date Due    COVID-19 Vaccine (1) Never done   [employer] Visit (AWV)  Never done    Shingles Vaccine (1 of 2) 03/12/2022 (Originally 12/28/1985)    TSH testing  06/19/2021    DTaP/Tdap/Td vaccine (3 - Td) 09/15/2030    DEXA (modify frequency per FRAX score)  Completed    Flu vaccine  Completed    Pneumococcal 65+ years Vaccine  Completed    Hepatitis A vaccine  Aged Out    Hepatitis B vaccine  Aged Out    Hib vaccine  Aged Out    Meningococcal (ACWY) vaccine  Aged Out     Recommendations for [employer] Due: see orders and patient instructions/AVS.  . Recommended screening schedule for the next 5-10 years is provided to the patient in written form: see Patient Collin Che was seen today for medicare awv. Diagnoses and all orders for this visit:    Routine general medical examination at a health care facility    Hypothyroidism, unspecified type  -     TSH without Reflex; Future    Macular degeneration of both eyes, unspecified type    Osteopenia, unspecified location    Other orders  -     levothyroxine (SYNTHROID) 100 MCG tablet; Take 1 tablet by mouth Daily           Pt appears stable & labs ordered. Adjustments of medication will be done after laboratory testing results available. Clinically she is obviously recovered from the Covid at this point of time and encourage her to continue with her daily activities.     Continue with ophthalmology care in regards to macular degeneration Laboratory profile reviewed from December when she was being treated for the Covid infection and the only additional test needs at this point time is thyroid    Patient received counseling on the following healthy behaviors: nutrition and exercise     Patient given educational materials     Health maintenance updated    Discussed use, benefit, and side effects of prescribed medications. Barriers to medication compliance addressed. All patient questions answered. Pt voiced understanding. Patient needs RTC in one year. Medical decision making of moderate complexity. Please note that this chart was generated using Dragon dictation software. Although every effort was made to ensure the accuracy of this automated transcription, some errors in transcription may have occurred.

## 2021-03-19 ENCOUNTER — HOSPITAL ENCOUNTER (OUTPATIENT)
Age: 86
Discharge: HOME | End: 2021-03-19
Payer: COMMERCIAL

## 2021-03-19 DIAGNOSIS — E03.9 HYPOTHYROIDISM, UNSPECIFIED TYPE: ICD-10-CM

## 2021-03-19 LAB — TSH SERPL DL<=0.05 MIU/L-ACNC: 1.82 UIU/ML (ref 0.27–4.2)

## 2021-03-19 PROCEDURE — 84443 ASSAY THYROID STIM HORMONE: CPT

## 2021-03-19 PROCEDURE — 36415 COLL VENOUS BLD VENIPUNCTURE: CPT

## 2022-03-07 ENCOUNTER — TELEPHONE (OUTPATIENT)
Dept: FAMILY MEDICINE CLINIC | Age: 87
End: 2022-03-07

## 2022-03-07 RX ORDER — LEVOTHYROXINE SODIUM 0.1 MG/1
100 TABLET ORAL DAILY
Qty: 30 TABLET | Refills: 0 | Status: SHIPPED | OUTPATIENT
Start: 2022-03-07 | End: 2022-04-15 | Stop reason: SDUPTHER

## 2022-03-07 NOTE — TELEPHONE ENCOUNTER
----- Message from Binh Thomas sent at 3/7/2022 12:51 PM EST -----  Subject: Refill Request    QUESTIONS  Name of Medication? levothyroxine (SYNTHROID) 100 MCG tablet  Patient-reported dosage and instructions? daily  How many days do you have left? 14  [facility]  Pharmacy phone number (if available)? [phone]  ---------------------------------------------------------------------------  --------------  Jeannette DAS  What is the best way for the office to contact you? OK to leave message on   voicemail  Preferred Call Back Phone Number?  [phone]

## 2022-03-07 NOTE — TELEPHONE ENCOUNTER
----- Message from Tanvigeno Patel sent at 3/7/2022 12:50 PM EST -----  Subject: Appointment Request    Reason for Call: Routine Medicare AWV    QUESTIONS  Type of Appointment? Established Patient  Reason for appointment request? No appointments available during search  Additional Information for Provider? patient needs to schedule AWV.  ---------------------------------------------------------------------------  --------------  CALL BACK INFO  What is the best way for the office to contact you? OK to leave message on   voicemail  Preferred Call Back Phone Number? [phone]  ---------------------------------------------------------------------------  --------------  SCRIPT ANSWERS  Relationship to Patient? Self  (If the patient has Medicare as their primary insurance coverage ask this   question) Are you requesting a Medicare Annual Wellness Visit? Yes   (Is the patient requesting a pap smear with their physical exam?)? No  (Is the patient requesting their annual physical and does not need PAP or   AWV per above?)? No  Have you been diagnosed with, awaiting test results for, or told that you   are suspected of having COVID-19 (Coronavirus)? (If patient has tested   negative or was tested as a requirement for work, school, or travel and   not based on symptoms, answer no)? No  Within the past 10 days have you developed any of the following symptoms   (answer no if symptoms have been present longer than 10 days or began   more than 10 days ago)? Fever or Chills, Cough, Shortness of breath or   difficulty breathing, Loss of taste or smell, Sore throat, Nasal   congestion, Sneezing or runny nose, Fatigue or generalized body aches   (answer no if pain is specific to a body part e.g. back pain), Diarrhea,   Headache? No  Have you had close contact with someone with COVID-19 in the last 7 days? No  (Service Expert  click yes below to proceed with [employer] As Usual   Scheduling)?  Yes

## 2022-04-15 ENCOUNTER — OFFICE VISIT (OUTPATIENT)
Dept: FAMILY MEDICINE CLINIC | Age: 87
End: 2022-04-15
Payer: COMMERCIAL

## 2022-04-15 VITALS
WEIGHT: 156 LBS | OXYGEN SATURATION: 97 % | TEMPERATURE: 97.3 F | SYSTOLIC BLOOD PRESSURE: 116 MMHG | BODY MASS INDEX: 26.63 KG/M2 | DIASTOLIC BLOOD PRESSURE: 66 MMHG | HEART RATE: 88 BPM | HEIGHT: 64 IN

## 2022-04-15 DIAGNOSIS — F41.9 ANXIETY: ICD-10-CM

## 2022-04-15 DIAGNOSIS — E03.9 HYPOTHYROIDISM, UNSPECIFIED TYPE: ICD-10-CM

## 2022-04-15 DIAGNOSIS — Z00.00 MEDICARE ANNUAL WELLNESS VISIT, SUBSEQUENT: Primary | ICD-10-CM

## 2022-04-15 DIAGNOSIS — R41.3 MEMORY CHANGE: ICD-10-CM

## 2022-04-15 PROCEDURE — 99214 OFFICE O/P EST MOD 30 MIN: CPT | Performed by: FAMILY MEDICINE

## 2022-04-15 PROCEDURE — G0439 PPPS, SUBSEQ VISIT: HCPCS | Performed by: FAMILY MEDICINE

## 2022-04-15 RX ORDER — LEVOTHYROXINE SODIUM 0.1 MG/1
100 TABLET ORAL DAILY
Qty: 90 TABLET | Refills: 3 | Status: SHIPPED | OUTPATIENT
Start: 2022-04-15

## 2022-04-15 RX ORDER — ESTRADIOL 0.1 MG/G
CREAM VAGINAL DAILY PRN
COMMUNITY
Start: 2022-04-11

## 2022-04-15 RX ORDER — BUSPIRONE HYDROCHLORIDE 5 MG/1
5 TABLET ORAL 3 TIMES DAILY PRN
COMMUNITY

## 2022-04-15 ASSESSMENT — LIFESTYLE VARIABLES: HOW OFTEN DO YOU HAVE A DRINK CONTAINING ALCOHOL: NEVER

## 2022-04-15 ASSESSMENT — PATIENT HEALTH QUESTIONNAIRE - PHQ9
SUM OF ALL RESPONSES TO PHQ9 QUESTIONS 1 & 2: 1
2. FEELING DOWN, DEPRESSED OR HOPELESS: 1
SUM OF ALL RESPONSES TO PHQ QUESTIONS 1-9: 1
1. LITTLE INTEREST OR PLEASURE IN DOING THINGS: 0
SUM OF ALL RESPONSES TO PHQ QUESTIONS 1-9: 1

## 2022-04-15 NOTE — PATIENT INSTRUCTIONS
Personalized Preventive Plan for Talha Moser - 4/15/2022  Medicare offers a range of preventive health benefits. Some of the tests and screenings are paid in full while other may be subject to a deductible, co-insurance, and/or copay. Some of these benefits include a comprehensive review of your medical history including lifestyle, illnesses that may run in your family, and various assessments and screenings as appropriate. After reviewing your medical record and screening and assessments performed today your provider may have ordered immunizations, labs, imaging, and/or referrals for you. A list of these orders (if applicable) as well as your Preventive Care list are included within your After Visit Summary for your review. Other Preventive Recommendations:    · A preventive eye exam performed by an eye specialist is recommended every 1-2 years to screen for glaucoma; cataracts, macular degeneration, and other eye disorders. · A preventive dental visit is recommended every 6 months. · Try to get at least 150 minutes of exercise per week or 10,000 steps per day on a pedometer . · Order or download the FREE \"Exercise & Physical Activity: Your Everyday Guide\" from [employer]. Call 7-757.321.3988 or search [employer] online. · You need 9776-7320 mg of calcium and 5467-3533 IU of vitamin D per day. It is possible to meet your calcium requirement with diet alone, but a vitamin D supplement is usually necessary to meet this goal.  · When exposed to the sun, use a sunscreen that protects against both UVA and UVB radiation with an SPF of 30 or greater. Reapply every 2 to 3 hours or after sweating, drying off with a towel, or swimming. · Always wear a seat belt when traveling in a car. Always wear a helmet when riding a bicycle or motorcycle.

## 2022-04-15 NOTE — PROGRESS NOTES
Medicare Annual Wellness Visit    Moriah Najera is here for Medicare AWV    Assessment & Plan   Medicare annual wellness visit, subsequent  Hypothyroidism, unspecified type  -     TSH; Future  Anxiety  Memory change  -     CBC; Future  -     Comprehensive Metabolic Panel; Future  -     Vitamin B12 & Folate; Future      Recommendations for Preventive Services Due: see orders and patient instructions/AVS.  Recommended screening schedule for the next 5-10 years is provided to the patient in written form: see Patient Instructions/AVS.     No follow-ups on file. Subjective   The following acute and/or chronic problems were also addressed today:      Patient's complete Health Risk Assessment and screening values have been reviewed and are found in Flowsheets. The following problems were reviewed today and where indicated follow up appointments were made and/or referrals ordered.     Positive Risk Factor Screenings with Interventions:    Fall Risk:  Do you feel unsteady or are you worried about falling? : (!) yes (feeling unsteady)  2 or more falls in past year?: no  Fall with injury in past year?: no     Fall Risk Interventions:    · Home safety tips provided  · Home exercises provided to promote strength and balance            General Health and ACP:  General  In general, how would you say your health is?: Very Good  In the past 7 days, have you experienced any of the following: New or Increased Pain, New or Increased Fatigue, Loneliness, Social Isolation, Stress or Anger?: No  Do you get the social and emotional support that you need?: Yes  Do you have a Living Will?: Yes    Advance Directives     Power of  Living Will ACP-Advance Directive ACP-Power of     Not on File Not on File Not on File Not on File      General Health Risk Interventions:  · Stress: medication prescribed- BuSpar      Safety:  Do you have working smoke detectors?: (!) No  Do you have any tripping hazards - loose or unsecured carpets or rugs?: No  Do you have any tripping hazards - clutter in doorways, halls, or stairs?: No  Do you have either shower bars, grab bars, non-slip mats or non-slip surfaces in your shower or bathtub?: Yes  Do all of your stairways have a railing or banister?: Yes  Do you always fasten your seatbelt when you are in a car?: Yes    Safety Interventions:  · Home safety tips provided           Objective   Vitals:    04/15/22 0928   BP: 116/66   Site: Right Upper Arm   Position: Sitting   Cuff Size: Medium Adult   Pulse: 88   Temp: 97.3 °F (36.3 °C)   TempSrc: Infrared   SpO2: 97%   Weight: 156 lb (70.8 kg)   Height: 5' 3.75\" (1.619 m)      Body mass index is 26.99 kg/m². No Known Allergies  Prior to Visit Medications    Medication Sig Taking?  Authorizing Provider   estradiol (ESTRACE) 0.1 MG/GM vaginal cream daily as needed Yes Historical Provider, MD   busPIRone (BUSPAR) 5 MG tablet Take 5 mg by mouth 3 times daily as needed Yes Historical Provider, MD   levothyroxine (SYNTHROID) 100 MCG tablet Take 1 tablet by mouth Daily Patient needs an appointment for additional refills Yes Fanny Bueno MD   aspirin 81 MG tablet Take 81 mg by mouth daily Yes Historical Provider, MD   docusate sodium (DULCOLAX) 100 MG capsule Take 100 mg by mouth daily Yes Historical Provider, MD   Ibuprofen-diphenhydrAMINE HCl (ADVIL PM) 200-25 MG CAPS Take 1 tablet by mouth nightly  Yes Historical Provider, MD Anne (Including outside providers/suppliers regularly involved in providing care):   Patient Care Team:  Fanny Bueno MD as PCP - Diego Rivas MD as PCP - [facility] Empaneled Provider  Abdullahi Correa MD as Consulting Physician (Ophthalmology)    Reviewed and updated this visit:  Tobacco  Allergies  Meds  Med Hx  Surg Hx  Soc Hx  Fam Hx

## 2022-04-15 NOTE — PROGRESS NOTES
Subjective:      Patient ID: Moriah Najera is a 82 y.o. female. CC: Patient presents for AMV and follow-up of chronic health problems    HPI patient feels that she started have memory issues. She is a lot more short-term memory issues and she thinks most this might be stress-induced. She has a grandson that has been having some mental health and drug usage problems this caused a lot of duress. She does have good family support from her other grandchildren and from her own children. She has a lot of friends. She does feel she is having more issues with short-term memory problems. She writes herself notes to understand how she could do for these issues. She does take the BuSpar on an intermittent basis only gone through less than 90 pills in the last year. She has no difficulty with sleep/takes her medications at nighttime. She is eating healthy. She is concerned about her balance although she has had no falls and does use any balance aids.     Review of Systems  Patient Active Problem List   Diagnosis    Hypothyroidism    Osteopenia    Urinary incontinence    Macular degeneration    Hypercholesteremia    Anxiety    Memory change       Outpatient Medications Marked as Taking for the 4/15/22 encounter (Office Visit) with Trey Yarbrough MD   Medication Sig Dispense Refill    estradiol (ESTRACE) 0.1 MG/GM vaginal cream daily as needed      levothyroxine (SYNTHROID) 100 MCG tablet Take 1 tablet by mouth Daily 90 tablet 3    busPIRone (BUSPAR) 5 MG tablet Take 5 mg by mouth 3 times daily as needed      aspirin 81 MG tablet Take 81 mg by mouth daily      docusate sodium (DULCOLAX) 100 MG capsule Take 100 mg by mouth daily      Ibuprofen-diphenhydrAMINE HCl (ADVIL PM) 200-25 MG CAPS Take 1 tablet by mouth nightly          No Known Allergies    Social History     Tobacco Use    Smoking status: Never Smoker    Smokeless tobacco: Never Used   Substance Use Topics    Alcohol use: Yes     Comment: occasional       /66 (Site: Right Upper Arm, Position: Sitting, Cuff Size: Medium Adult)   Pulse 88   Temp 97.3 °F (36.3 °C) (Infrared)   Ht 5' 3.75\" (1.619 m)   Wt 156 lb (70.8 kg)   SpO2 97%   BMI 26.99 kg/m²       Objective:   Physical Exam  Vitals and nursing note reviewed. Constitutional:       General: She is not in acute distress. Appearance: Normal appearance. She is well-developed and well-groomed. HENT:      Right Ear: Tympanic membrane normal.      Left Ear: Tympanic membrane normal.   Neck:      Vascular: No carotid bruit. Cardiovascular:      Rate and Rhythm: Normal rate and regular rhythm. Pulses:           Dorsalis pedis pulses are 2+ on the right side and 2+ on the left side. Posterior tibial pulses are 2+ on the right side and 2+ on the left side. Heart sounds: Normal heart sounds. No murmur heard. No friction rub. No gallop. Pulmonary:      Effort: Pulmonary effort is normal.      Breath sounds: Normal breath sounds. Abdominal:      General: Bowel sounds are normal. There is no distension. Palpations: Abdomen is soft. Abdomen is not rigid. There is no hepatomegaly, splenomegaly or mass. Tenderness: There is no abdominal tenderness. There is no right CVA tenderness, left CVA tenderness, guarding or rebound. Hernia: No hernia is present. Musculoskeletal:         General: No tenderness. Normal range of motion. Right lower leg: No edema. Left lower leg: No edema. Lymphadenopathy:      Cervical: No cervical adenopathy. Skin:     General: Skin is warm. Findings: No rash. Neurological:      General: No focal deficit present. Mental Status: She is alert and oriented to person, place, and time. Mental status is at baseline. Sensory: Sensation is intact. Motor: Motor function is intact.    Psychiatric:         Attention and Perception: Attention and perception normal.         Mood and Affect: Mood and affect normal.         Speech: Speech normal.         Behavior: Behavior normal. Behavior is cooperative. Thought Content: Thought content normal.         Cognition and Memory: Cognition and memory normal.         Judgment: Judgment normal.         Assessment:      Clifford John was seen today for medicare awv. Diagnoses and all orders for this visit:    Medicare annual wellness visit, subsequent    Hypothyroidism, unspecified type  -     TSH; Future    Anxiety    Memory change  -     CBC; Future  -     Comprehensive Metabolic Panel; Future  -     Vitamin B12 & Folate; Future    Other orders  -     levothyroxine (SYNTHROID) 100 MCG tablet; Take 1 tablet by mouth Daily            Plan:      Proceed with laboratory testing. Adjustments of medication will be done after laboratory testing results available. Maintain current thyroid medication. For the memory issues proceed with laboratory testing first and if these are normal we did discuss starting Aricept medication. For the anxiety she overall has good family support and recommend she start using the BuSpar medication more regularly    Patient received counseling on the following healthy behaviors: nutrition and exercise     Patient given educational materials     Health maintenance updated    Discussed use, benefit, and side effects of prescribed medications. Barriers to medication compliance addressed. All patient questions answered. Pt voiced understanding. Patient needs RTC in one year for annual Medicare visit. Medical decision making of moderate complexity. Please note that this chart was generated using Dragon dictation software. Although every effort was made to ensure the accuracy of this automated transcription, some errors in transcription may have occurred.             Alexa Delgado MD

## 2022-04-18 ENCOUNTER — TELEPHONE (OUTPATIENT)
Dept: FAMILY MEDICINE CLINIC | Age: 87
End: 2022-04-18

## 2022-04-18 ENCOUNTER — HOSPITAL ENCOUNTER (OUTPATIENT)
Age: 87
Discharge: HOME | End: 2022-04-18
Payer: COMMERCIAL

## 2022-04-18 DIAGNOSIS — E03.9 HYPOTHYROIDISM, UNSPECIFIED TYPE: ICD-10-CM

## 2022-04-18 DIAGNOSIS — R41.3 MEMORY CHANGE: ICD-10-CM

## 2022-04-18 LAB
A/G RATIO: 1.7 (ref 1.1–2.2)
ALBUMIN SERPL-MCNC: 4.2 G/DL (ref 3.4–5)
ALP BLD-CCNC: 77 U/L (ref 40–129)
ALT SERPL-CCNC: 13 U/L (ref 10–40)
ANION GAP SERPL CALCULATED.3IONS-SCNC: 11 MMOL/L (ref 3–16)
AST SERPL-CCNC: 19 U/L (ref 15–37)
BILIRUB SERPL-MCNC: 0.6 MG/DL (ref 0–1)
BUN BLDV-MCNC: 14 MG/DL (ref 7–20)
CALCIUM SERPL-MCNC: 9.2 MG/DL (ref 8.3–10.6)
CHLORIDE BLD-SCNC: 107 MMOL/L (ref 99–110)
CO2: 25 MMOL/L (ref 21–32)
CREAT SERPL-MCNC: 0.7 MG/DL (ref 0.6–1.2)
FOLATE: 18.55 NG/ML (ref 4.78–24.2)
GFR AFRICAN AMERICAN: >60
GFR NON-AFRICAN AMERICAN: >60
GLUCOSE BLD-MCNC: 92 MG/DL (ref 70–99)
POTASSIUM SERPL-SCNC: 4.4 MMOL/L (ref 3.5–5.1)
REASON FOR REJECTION: NORMAL
REJECTED TEST: NORMAL
SODIUM BLD-SCNC: 143 MMOL/L (ref 136–145)
TOTAL PROTEIN: 6.7 G/DL (ref 6.4–8.2)
TSH SERPL DL<=0.05 MIU/L-ACNC: 1.2 UIU/ML (ref 0.27–4.2)
VITAMIN B-12: 286 PG/ML (ref 211–911)

## 2022-04-18 PROCEDURE — 82746 ASSAY OF FOLIC ACID SERUM: CPT

## 2022-04-18 PROCEDURE — 80053 COMPREHEN METABOLIC PANEL: CPT

## 2022-04-18 PROCEDURE — 82607 VITAMIN B-12: CPT

## 2022-04-18 PROCEDURE — 84443 ASSAY THYROID STIM HORMONE: CPT

## 2022-04-18 PROCEDURE — 36415 COLL VENOUS BLD VENIPUNCTURE: CPT

## 2022-04-18 NOTE — TELEPHONE ENCOUNTER
Duane Rung called and would like to talk to either PCP or his nurse concerning her mom. Mom is very forgetful and had an appt 4/15/22 that daughter was supposed to be there but did not know about.       [phone]    Please advise

## 2022-04-18 NOTE — TELEPHONE ENCOUNTER
I assume the daughter is listed on this patient's HIPAA. Assuming she is then indeed the patient did come in for appointment and was to have laboratory testing performed. We did discuss her laboratory testing with stable that we would start her on memory medications. Patient did recognize that she was having memory issues.

## 2022-04-20 RX ORDER — DONEPEZIL HYDROCHLORIDE 10 MG/1
10 TABLET, FILM COATED ORAL NIGHTLY
Qty: 30 TABLET | Refills: 3 | Status: SHIPPED | OUTPATIENT
Start: 2022-04-20 | End: 2022-08-29

## 2022-04-20 NOTE — TELEPHONE ENCOUNTER
Patient's daughter called back and states patient is willing to try the Aricept medication. Please send to pharmacy.

## 2022-04-22 ENCOUNTER — HOSPITAL ENCOUNTER (OUTPATIENT)
Age: 87
Discharge: HOME | End: 2022-04-22
Payer: COMMERCIAL

## 2022-04-22 LAB
HCT VFR BLD CALC: 40.5 % (ref 36–48)
HEMOGLOBIN: 13.4 G/DL (ref 12–16)
MCH RBC QN AUTO: 29.6 PG (ref 26–34)
MCHC RBC AUTO-ENTMCNC: 33.1 G/DL (ref 31–36)
MCV RBC AUTO: 89.4 FL (ref 80–100)
PDW BLD-RTO: 15.1 % (ref 12.4–15.4)
PLATELET # BLD: 221 K/UL (ref 135–450)
PMV BLD AUTO: 8.8 FL (ref 5–10.5)
RBC # BLD: 4.53 M/UL (ref 4–5.2)
WBC # BLD: 6.9 K/UL (ref 4–11)

## 2022-04-22 PROCEDURE — 36415 COLL VENOUS BLD VENIPUNCTURE: CPT

## 2022-04-22 PROCEDURE — 85027 COMPLETE CBC AUTOMATED: CPT

## 2022-05-16 ENCOUNTER — APPOINTMENT (OUTPATIENT)
Dept: CT IMAGING | Age: 87
End: 2022-05-16
Payer: COMMERCIAL

## 2022-05-16 ENCOUNTER — HOSPITAL ENCOUNTER (EMERGENCY)
Age: 87
Discharge: HOME | End: 2022-05-16
Payer: COMMERCIAL

## 2022-05-16 VITALS
TEMPERATURE: 98.4 F | BODY MASS INDEX: 26.33 KG/M2 | RESPIRATION RATE: 18 BRPM | SYSTOLIC BLOOD PRESSURE: 154 MMHG | OXYGEN SATURATION: 94 % | HEART RATE: 72 BPM | WEIGHT: 158 LBS | DIASTOLIC BLOOD PRESSURE: 67 MMHG | HEIGHT: 65 IN

## 2022-05-16 DIAGNOSIS — S01.81XA FACIAL LACERATION, INITIAL ENCOUNTER: ICD-10-CM

## 2022-05-16 DIAGNOSIS — L03.116 CELLULITIS OF LEFT LOWER EXTREMITY: ICD-10-CM

## 2022-05-16 DIAGNOSIS — S09.90XA INJURY OF HEAD, INITIAL ENCOUNTER: ICD-10-CM
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
## 2022-05-16 PROCEDURE — 72125 CT NECK SPINE W/O DYE: CPT

## 2022-05-16 PROCEDURE — 99284 EMERGENCY DEPT VISIT MOD MDM: CPT

## 2022-05-16 PROCEDURE — 12011 RPR F/E/E/N/L/M 2.5 CM/<: CPT

## 2022-05-16 PROCEDURE — 70450 CT HEAD/BRAIN W/O DYE: CPT

## 2022-05-16 PROCEDURE — 90471 IMMUNIZATION ADMIN: CPT | Performed by: PHYSICIAN ASSISTANT

## 2022-05-16 PROCEDURE — 6360000002 HC RX W HCPCS: Performed by: PHYSICIAN ASSISTANT

## 2022-05-16 PROCEDURE — 90715 TDAP VACCINE 7 YRS/> IM: CPT | Performed by: PHYSICIAN ASSISTANT

## 2022-05-16 RX ORDER — CEPHALEXIN 500 MG/1
500 CAPSULE ORAL 4 TIMES DAILY
Qty: 40 CAPSULE | Refills: 0 | Status: SHIPPED | OUTPATIENT
Start: 2022-05-16 | End: 2022-05-26

## 2022-05-16 RX ADMIN — TETANUS TOXOID, REDUCED DIPHTHERIA TOXOID AND ACELLULAR PERTUSSIS VACCINE, ADSORBED 0.5 ML: 5; 2.5; 8; 8; 2.5 SUSPENSION INTRAMUSCULAR at 17:05

## 2022-05-16 ASSESSMENT — ENCOUNTER SYMPTOMS
COUGH: 0
DIARRHEA: 0
VOMITING: 0
NAUSEA: 0
RHINORRHEA: 0
SHORTNESS OF BREATH: 0
WHEEZING: 0
ABDOMINAL PAIN: 0

## 2022-05-16 ASSESSMENT — PAIN - FUNCTIONAL ASSESSMENT: PAIN_FUNCTIONAL_ASSESSMENT: NONE - DENIES PAIN

## 2022-05-16 NOTE — ED PROVIDER NOTES
[facility]        Pt Name: Moriah Najera  MRN: [id number]  [place] 1935  Date of evaluation: 5/16/2022  Provider: Tyler Levi PA-C  PCP: Trey Yarbrough MD  Note Started: 4:58 PM EDT       MATHEW. I have evaluated this patient. My supervising physician was available for consultation. CHIEF COMPLAINT       Chief Complaint   Patient presents with   Everlene Bobby Fall     arrived per family d/t a fall with a head laceration above right eye that occurred less than an hr ago; bleeding controlled; LLE small red area pt concerned about       HISTORY OF PRESENT ILLNESS   (Location, Timing/Onset, Context/Setting, Quality, Duration, Modifying Factors, Severity, Associated Signs and Symptoms)  Note limiting factors. Chief Complaint: Head injury     Moriah Najera is a 82 y.o. female who presents for evaluation of head injury status post mechanical fall. Patient states that she had tripped up a step going into her kitchen and lost her balance, falling and hitting her head. There is a laceration above her right eyebrow. She denies any loss of consciousness. No neck or back pain. Bleeding is controlled. Tetanus is not up-to-date. She is not anticoagulated. She has no other injuries or complaints at this time. Nursing Notes were all reviewed and agreed with or any disagreements were addressed in the HPI. REVIEW OF SYSTEMS    (2-9 systems for level 4, 10 or more for level 5)     Review of Systems   Constitutional: Negative for appetite change, chills and fever. HENT: Negative for congestion and rhinorrhea. Eyes: Negative for visual disturbance. Respiratory: Negative for cough, shortness of breath and wheezing. Cardiovascular: Negative for chest pain. Gastrointestinal: Negative for abdominal pain, diarrhea, nausea and vomiting. Genitourinary: Negative for difficulty urinating, dysuria and hematuria.    Musculoskeletal: Negative for neck pain and neck stiffness. Skin: Positive for wound. Negative for rash. Neurological: Negative for dizziness, syncope, weakness, light-headedness and headaches. Positives and Pertinent negatives as per HPI. Except as noted above in the ROS, all other systems were reviewed and negative. PAST MEDICAL HISTORY     Past Medical History:   Diagnosis Date    Adhesive capsulitis of shoulder     Hypothyroidism     Osteoporosis, unspecified     Routine general medical examination at a health care facility     Seborrheic dermatitis, unspecified     Unspecified disorder of skin and subcutaneous tissue          SURGICAL HISTORY     Past Surgical History:   Procedure Laterality Date    CATARACT REMOVAL      left    CATARACT REMOVAL      right    COLONOSCOPY      OTHER SURGICAL HISTORY      anal fissure         CURRENTMEDICATIONS       Previous Medications    ASPIRIN 81 MG TABLET    Take 81 mg by mouth daily    BUSPIRONE (BUSPAR) 5 MG TABLET    Take 5 mg by mouth 3 times daily as needed    DOCUSATE SODIUM (DULCOLAX) 100 MG CAPSULE    Take 100 mg by mouth daily    DONEPEZIL (ARICEPT) 10 MG TABLET    Take 1 tablet by mouth nightly    ESTRADIOL (ESTRACE) 0.1 MG/GM VAGINAL CREAM    daily as needed    IBUPROFEN-DIPHENHYDRAMINE HCL (ADVIL PM) 200-25 MG CAPS    Take 1 tablet by mouth nightly     LEVOTHYROXINE (SYNTHROID) 100 MCG TABLET    Take 1 tablet by mouth Daily         ALLERGIES     Patient has no known allergies.     FAMILYHISTORY       Family History   Problem Relation Age of Onset    Hypertension Sister     Diabetes Sister     Stroke Father 76    High Blood Pressure Father     Heart Disease Mother [de-identified]    Cancer Paternal Aunt         breast          SOCIAL HISTORY       Social History     Tobacco Use    Smoking status: Never Smoker    Smokeless tobacco: Never Used   Substance Use Topics    Alcohol use: Yes     Comment: occasional    Drug use: No       SCREENINGS    [place] Coma Scale  Eye Opening: Spontaneous  Best Verbal Response: Oriented  Best Motor Response: Obeys commands  Conrad Coma Scale Score: 15        PHYSICAL EXAM    (up to 7 for level 4, 8 or more for level 5)     ED Triage Vitals   BP Temp Temp Source Pulse Resp SpO2 Height Weight   05/16/22 1637 05/16/22 1634 05/16/22 1634 05/16/22 1634 05/16/22 1634 05/16/22 1634 05/16/22 1634 05/16/22 1634   (!) 154/67 98.4 °F (36.9 °C) Oral 72 18 94 % 5' 5\" (1.651 m) 158 lb (71.7 kg)       Physical Exam  Vitals and nursing note reviewed. Constitutional:       Appearance: She is well-developed. She is not diaphoretic. HENT:      Head: Normocephalic. Contusion and laceration present. No raccoon eyes, Pena's sign or abrasion. Right Ear: External ear normal.      Left Ear: External ear normal.      Nose: Nose normal.   Eyes:      General:         Right eye: No discharge. Left eye: No discharge. Pulmonary:      Effort: Pulmonary effort is normal. No respiratory distress. Musculoskeletal:         General: Normal range of motion. Cervical back: Normal range of motion and neck supple. No tenderness. Skin:     General: Skin is warm and dry. Neurological:      Mental Status: She is alert and oriented to person, place, and time. Mental status is at baseline. GCS: GCS eye subscore is 4. GCS verbal subscore is 5. GCS motor subscore is 6. Cranial Nerves: Cranial nerves are intact. Sensory: Sensation is intact. Motor: Motor function is intact. Psychiatric:         Behavior: Behavior normal.         DIAGNOSTIC RESULTS   LABS:    Labs Reviewed - No data to display    When ordered only abnormal lab results are displayed. All other labs were within normal range or not returned as of this dictation. EKG: When ordered, EKG's are interpreted by the Emergency Department Physician in the absence of a cardiologist.  Please see their note for interpretation of EKG.     RADIOLOGY:   Non-plain film images such as CT, Ultrasound and MRI are read by the radiologist. Plain radiographic images are visualized and preliminarily interpreted by the ED Provider with the below findings:        Interpretation per the Radiologist below, if available at the time of this note:    CT CERVICAL SPINE WO CONTRAST   Final Result   Moderate degenerative disc changes throughout the lower cervical spine with   no acute abnormality seen. Moderate disc osteophyte complex at C4-5. CT HEAD WO CONTRAST   Final Result   Mild atrophy and moderate chronic microischemic disease scattered in the deep   white matter which is unchanged with no acute abnormality seen. No results found. PROCEDURES   Unless otherwise noted below, none     Procedures  Laceration Repair Procedure Note    Indication: Laceration    Procedure: The patient was placed in the appropriate position and anesthesia around the laceration was obtained by infiltration using 1% Lidocaine without epinephrine. The area was then cleansed with betadine and draped in a sterile fashion, irrigated with high pressure Shur-Clens and normal saline solution and explored with no foreign bodies discovered. The laceration was closed with 5-0 Ethilon using interrupted sutures. There were no additional lacerations requiring repair. The wound area was then dressed with bacitracin and a bandage. Total repaired wound length: 1.5 cm. Other Items: Suture count: 3    The patient tolerated the procedure well.     Complications: None      CRITICAL CARE TIME       CONSULTS:  None      EMERGENCY DEPARTMENT COURSE and DIFFERENTIAL DIAGNOSIS/MDM:   Vitals:    Vitals:    05/16/22 1634 05/16/22 1637   BP:  (!) 154/67   Pulse: 72    Resp: 18    Temp: 98.4 °F (36.9 °C)    TempSrc: Oral    SpO2: 94%    Weight: 158 lb (71.7 kg)    Height: 5' 5\" (1.651 m)        Patient was given the following medications:  Medications   Tetanus-Diphth-Acell Pertussis (BOOSTRIX) injection 0.5 mL (0.5 mLs IntraMUSCular Given 5/16/22 1705)         Is this patient to be included in the SEP-1 Core Measure due to severe sepsis or septic shock? No   Exclusion criteria - the patient is NOT to be included for SEP-1 Core Measure due to: Infection is not suspected    Patient presents for evaluation of head injury status post mechanical fall. On exam, she is resting comfortably in bed no acute distress and nontoxic. Vitals are stable and she is afebrile. She has a 1-1/2 cm slightly gaping laceration just superior to the right eyebrow with associated contusion. There is no bony step-offs crepitus or bogginess. Pupils are equal round and reactive to light. Extraocular movements are intact without pain difficulty or evidence of entrapment. She is alert orient x3. GCS 15. Cranial nerves II through XII are intact. Midline neck and back are nontender. Tetanus is updated in the ED. CT of the head shows no acute intracranial abnormality. CT of the cervical spine is negative. Laceration was repaired per procedure note patient tolerated without difficulty. Symptomatic, supportive and wound care discussed including follow-up for suture removal in 6 to 7 days. I estimate there is LOW risk for SKULL FRACTURE, INTRACRANIAL HEMORRHAGE, CERVICAL SPINE INJURY, SUBDURAL OR EPIDURAL HEMATOMA,  thus I consider the discharge disposition reasonable. Conditions for return to the ED were also discussed such as any new or worsening symptoms or signs of a more acute head injury, neurovascular compromise, intractable pain, wound dehiscence/rebleed or infection. She is agreeable to this plan and stable for discharge at this time. T of the head shows no acute intracranial abnormality. CT of the cervical spine shows degenerative disc changes with no acute abnormality seen. There is significant erythema purulent drainage surrounding a skin tear to her left anterior shin.   She will be covered empirically with antibiotics due to concern for cellulitis. I estimate there is LOW risk for SKULL FRACTURE, INTRACRANIAL HEMORRHAGE, CERVICAL SPINE INJURY, SUBDURAL OR EPIDURAL HEMATOMA,  thus I consider the discharge disposition reasonable. Encouraged follow-up for suture removal in 6 to 7 days. Conditions for return to the ED were also discussed such as any new or worsening symptoms or signs of neurovascular compromise, intractable pain, wound dehiscence/rebleed or infection. She is agreeable to this plan and stable for discharge at this time. FINAL IMPRESSION      1. Fall, initial encounter    2. Injury of head, initial encounter    3. Facial laceration, initial encounter    4.  Cellulitis of left lower extremity          DISPOSITION/PLAN   DISPOSITION        PATIENT REFERRED TO:  Josefina Fink MD  [facility]  [phone]    Call   For a re-check in  2-3  days    [facility] Emergency Department  [address] [place], [place] [phone]  Go to   If symptoms worsen      DISCHARGE MEDICATIONS:  New Prescriptions    CEPHALEXIN (KEFLEX) 500 MG CAPSULE    Take 1 capsule by mouth 4 times daily for 10 days       DISCONTINUED MEDICATIONS:  Discontinued Medications    No medications on file              (Please note that portions of this note were completed with a voice recognition program.  Efforts were made to edit the dictations but occasionally words are mis-transcribed.)    Manjeet Patel PA-C (electronically signed)           Nithya Mejia PA-C  05/16/22 1955

## 2022-05-17 ENCOUNTER — TELEPHONE (OUTPATIENT)
Dept: FAMILY MEDICINE CLINIC | Age: 87
End: 2022-05-17

## 2022-05-17 NOTE — TELEPHONE ENCOUNTER
----- Message from Ramon Patel sent at 5/17/2022 12:03 PM EDT -----  Subject: Message to Provider    QUESTIONS  Information for Provider? pt needs to have 3 stitches above rt eyebrow   that need to removed on [id number] aval appt is [id number] is ok to see other   providers in the office --please call to asst with appt  ---------------------------------------------------------------------------  --------------  [id number]  What is the best way for the office to contact you? OK to leave message on   voicemail  Preferred Call Back Phone Number? [phone]  ---------------------------------------------------------------------------  --------------  SCRIPT ANSWERS  Relationship to Patient? Self  (Is the patient requesting to see the provider for a procedure?)? Yes  (Is the patient requesting to see the provider urgently  today or   tomorrow. )? No  Have you been diagnosed with, awaiting test results for, or told that you   are suspected of having COVID-19 (Coronavirus)? (If patient has tested   negative or was tested as a requirement for work, school, or travel and   not based on symptoms, answer no)? No  Within the past 10 days have you developed any of the following symptoms   (answer no if symptoms have been present longer than 10 days or began   more than 10 days ago)? Fever or Chills, Cough, Shortness of breath or   difficulty breathing, Loss of taste or smell, Sore throat, Nasal   congestion, Sneezing or runny nose, Fatigue or generalized body aches   (answer no if pain is specific to a body part e.g. back pain), Diarrhea,   Headache? No  Have you had close contact with someone with COVID-19 in the last 7 days? No  (Service Expert  click yes below to proceed with [employer] As Usual   Scheduling)? Yes  Is this follow up request related to routine Diabetes Management?  No

## 2022-05-20 ENCOUNTER — OFFICE VISIT (OUTPATIENT)
Dept: FAMILY MEDICINE CLINIC | Age: 87
End: 2022-05-20
Payer: COMMERCIAL

## 2022-05-20 VITALS
BODY MASS INDEX: 26.29 KG/M2 | SYSTOLIC BLOOD PRESSURE: 128 MMHG | TEMPERATURE: 98 F | DIASTOLIC BLOOD PRESSURE: 62 MMHG | OXYGEN SATURATION: 96 % | HEART RATE: 90 BPM | WEIGHT: 158 LBS

## 2022-05-20 DIAGNOSIS — S01.81XA FACIAL LACERATION, INITIAL ENCOUNTER: Primary | ICD-10-CM

## 2022-05-20 DIAGNOSIS — S60.00XA CONTUSION OF FINGER OF RIGHT HAND, INITIAL ENCOUNTER: ICD-10-CM

## 2022-05-20 DIAGNOSIS — S81.812A LACERATION OF LEFT LOWER EXTREMITY, INITIAL ENCOUNTER: ICD-10-CM

## 2022-05-20 PROCEDURE — 99213 OFFICE O/P EST LOW 20 MIN: CPT | Performed by: FAMILY MEDICINE

## 2022-05-20 NOTE — PROGRESS NOTES
Subjective:      Patient ID: Jan Reed is a 82 y.o. female. CC: Patient presents for hospital follow-up. HPI Patient presents for a follow-up from [facility] ED on 5/16/22. Patient states she tripped and fell when going room to room. Patient has sutures above her right eye. Patient also has noted bruising of the right hand and some tenderness with pushing off of the right hand. She apparently did not have this in emergency room. She also states she scraped her left leg 2 days ago on her son's car. She has been using some Polysporin ointment to the affected area.     Review of Systems     Patient Active Problem List   Diagnosis    Hypothyroidism    Osteopenia    Urinary incontinence    Macular degeneration    Hypercholesteremia    Anxiety    Memory change       Outpatient Medications Marked as Taking for the 5/20/22 encounter (Office Visit) with Debbie Felming MD   Medication Sig Dispense Refill    cephALEXin (KEFLEX) 500 MG capsule Take 1 capsule by mouth 4 times daily for 10 days 40 capsule 0    donepezil (ARICEPT) 10 MG tablet Take 1 tablet by mouth nightly 30 tablet 3    estradiol (ESTRACE) 0.1 MG/GM vaginal cream daily as needed      levothyroxine (SYNTHROID) 100 MCG tablet Take 1 tablet by mouth Daily 90 tablet 3    busPIRone (BUSPAR) 5 MG tablet Take 5 mg by mouth 3 times daily as needed      aspirin 81 MG tablet Take 81 mg by mouth daily      docusate sodium (DULCOLAX) 100 MG capsule Take 100 mg by mouth daily      Ibuprofen-diphenhydrAMINE HCl (ADVIL PM) 200-25 MG CAPS Take 1 tablet by mouth nightly          No Known Allergies    Social History     Tobacco Use    Smoking status: Never Smoker    Smokeless tobacco: Never Used   Substance Use Topics    Alcohol use: Yes     Comment: occasional       /62 (Site: Left Upper Arm, Position: Sitting, Cuff Size: Medium Adult)   Pulse 90   Temp 98 °F (36.7 °C) (Temporal)   Wt 158 lb (71.7 kg)   SpO2 96%   BMI 26.29 kg/m² Objective:   Physical Exam  Vitals and nursing note reviewed. Constitutional:       General: She is not in acute distress. Appearance: She is well-developed. Musculoskeletal:      Right wrist: No tenderness. Normal range of motion. Right hand: No deformity or bony tenderness. Normal range of motion. Skin:     General: Skin is warm. Findings: Laceration present. Comments: Just above the right eyebrow she has a 1 and half centimeter laceration that is healing well with sutures not ready to be removed    Left leg she has a superficial abrasion of approximately 1 cm in total diameter. No signs of infection. Lateral aspect of right hand has resolving ecchymosis. The ecchymosis both on the palmar and dorsum of the hand. Neurological:      Mental Status: She is alert. Psychiatric:         Behavior: Behavior is cooperative. Assessment:      Jonh Mcneill was seen today for ed follow-up. Diagnoses and all orders for this visit:    Facial laceration, initial encounter    Contusion of finger of right hand, initial encounter    Laceration of left lower extremity, initial encounter            Plan:      Recommended patient needs to return in next 3 days for removal of the sutures of the face  For the superficial laceration of the left leg start antibiotic ointment twice daily and watch for signs of infection  No additional intervention required in regards to the leg contusion    Medical decision making of low complexity      Please note that this chart was generated using Dragon dictation software. Although every effort was made to ensure the accuracy of this automated transcription, some errors in transcription may have occurred.

## 2022-05-23 ENCOUNTER — OFFICE VISIT (OUTPATIENT)
Dept: FAMILY MEDICINE CLINIC | Age: 87
End: 2022-05-23
Payer: COMMERCIAL

## 2022-05-23 VITALS
SYSTOLIC BLOOD PRESSURE: 132 MMHG | HEART RATE: 63 BPM | RESPIRATION RATE: 12 BRPM | WEIGHT: 159.5 LBS | OXYGEN SATURATION: 97 % | DIASTOLIC BLOOD PRESSURE: 60 MMHG | BODY MASS INDEX: 26.54 KG/M2 | TEMPERATURE: 96.8 F

## 2022-05-23 DIAGNOSIS — S01.81XD FACIAL LACERATION, SUBSEQUENT ENCOUNTER: Primary | ICD-10-CM

## 2022-05-23 PROCEDURE — 99212 OFFICE O/P EST SF 10 MIN: CPT | Performed by: FAMILY MEDICINE

## 2022-05-23 NOTE — PROGRESS NOTES
Pt presents for suture removal.  Facial laceration appears to be healing well at this time. Left leg abrasion also appears to be healing well. Physical Exam   /60 (Site: Right Upper Arm, Position: Sitting, Cuff Size: Medium Adult)   Pulse 63   Temp 96.8 °F (36 °C) (Infrared)   Resp 12   Wt 159 lb 8 oz (72.3 kg)   SpO2 97%   BMI 26.54 kg/m²     Constitutional: She appears well-developed and well-nourished. No distress. Biopsy results discussed with pt NA  the wound appears well healed  Sutures removed without difficulty    Assessment:   Vikash Sutton was seen today for suture / staple removal.    Diagnoses and all orders for this visit:    Facial laceration, subsequent encounter          Plan  Wound care instructions provided  Patient was instructed to be alert for any signs of cutaneous infection.   Follow-up at normal appointment time

## 2022-08-29 RX ORDER — DONEPEZIL HYDROCHLORIDE 10 MG/1
TABLET, FILM COATED ORAL
Qty: 30 TABLET | Refills: 0 | Status: SHIPPED | OUTPATIENT
Start: 2022-08-29 | End: 2022-09-28

## 2022-08-29 NOTE — TELEPHONE ENCOUNTER
Medication:   Requested Prescriptions     Pending Prescriptions Disp Refills    donepezil (ARICEPT) 10 MG tablet [Pharmacy Med Name: DONEPEZIL HCL 10 MG TABLET] 30 tablet 3     Sig: TAKE ONE TABLET BY MOUTH ONCE NIGHTLY      Last Filled:      Patient Phone Number: [phone] (home)     Last appt: 5/23/2022   Next appt: Visit date not found    Last OARRS: No flowsheet data found.   PDMP Monitoring:    Last PDMP Jagdish jameson Reviewed [facility]):  Review User Review Instant Review Result          Preferred Pharmacy:   [facility] [id number] [place] [address]  [address] [place] [address]  Phone: [phone] Fax: [phone]

## 2022-09-28 RX ORDER — DONEPEZIL HYDROCHLORIDE 10 MG/1
TABLET, FILM COATED ORAL
Qty: 30 TABLET | Refills: 3 | Status: SHIPPED | OUTPATIENT
Start: 2022-09-28

## 2023-02-03 RX ORDER — DONEPEZIL HYDROCHLORIDE 10 MG/1
TABLET, FILM COATED ORAL
Qty: 30 TABLET | Refills: 0 | Status: SHIPPED | OUTPATIENT
Start: 2023-02-03

## 2023-02-03 NOTE — TELEPHONE ENCOUNTER
----- Message from Brittney Garcia MA sent at 2/3/2023 11:49 AM EST -----  Subject: Refill Request    QUESTIONS  Name of Medication? donepezil (ARICEPT) 10 MG tablet  Patient-reported dosage and instructions? 10MG, QD  How many days do you have left? 1  Preferred Pharmacy? [facility] [id number]  Pharmacy phone number (if available)? [phone]  ---------------------------------------------------------------------------  --------------  Michelle Argue INFO  What is the best way for the office to contact you? Do not leave any   message, patient will call back for answer  Preferred Call Back Phone Number? [phone]  ---------------------------------------------------------------------------  --------------  SCRIPT ANSWERS  Relationship to Patient?  Self

## 2023-02-06 ENCOUNTER — OFFICE VISIT (OUTPATIENT)
Dept: FAMILY MEDICINE CLINIC | Age: 88
End: 2023-02-06
Payer: COMMERCIAL

## 2023-02-06 ENCOUNTER — TELEPHONE (OUTPATIENT)
Dept: FAMILY MEDICINE CLINIC | Age: 88
End: 2023-02-06

## 2023-02-06 VITALS
BODY MASS INDEX: 26.13 KG/M2 | SYSTOLIC BLOOD PRESSURE: 102 MMHG | DIASTOLIC BLOOD PRESSURE: 62 MMHG | OXYGEN SATURATION: 98 % | HEART RATE: 78 BPM | WEIGHT: 157 LBS | TEMPERATURE: 97.5 F

## 2023-02-06 DIAGNOSIS — R06.02 SOB (SHORTNESS OF BREATH): ICD-10-CM

## 2023-02-06 DIAGNOSIS — E03.9 HYPOTHYROIDISM, UNSPECIFIED TYPE: ICD-10-CM

## 2023-02-06 DIAGNOSIS — F41.9 ANXIETY: ICD-10-CM

## 2023-02-06 DIAGNOSIS — R41.3 MEMORY CHANGE: ICD-10-CM

## 2023-02-06 DIAGNOSIS — G30.9 ALZHEIMER'S DISEASE, UNSPECIFIED (CODE) (HCC): Primary | ICD-10-CM

## 2023-02-06 LAB
A/G RATIO: 1.5 (ref 1.1–2.2)
ALBUMIN SERPL-MCNC: 4 G/DL (ref 3.4–5)
ALP BLD-CCNC: 80 U/L (ref 40–129)
ALT SERPL-CCNC: 9 U/L (ref 10–40)
ANION GAP SERPL CALCULATED.3IONS-SCNC: 12 MMOL/L (ref 3–16)
AST SERPL-CCNC: 16 U/L (ref 15–37)
BILIRUB SERPL-MCNC: 0.5 MG/DL (ref 0–1)
BUN BLDV-MCNC: 16 MG/DL (ref 7–20)
CALCIUM SERPL-MCNC: 9.4 MG/DL (ref 8.3–10.6)
CHLORIDE BLD-SCNC: 102 MMOL/L (ref 99–110)
CO2: 26 MMOL/L (ref 21–32)
CREAT SERPL-MCNC: 0.8 MG/DL (ref 0.6–1.2)
GFR SERPL CREATININE-BSD FRML MDRD: >60 ML/MIN/{1.73_M2}
GLUCOSE BLD-MCNC: 101 MG/DL (ref 70–99)
HCT VFR BLD CALC: 43.8 % (ref 36–48)
HEMOGLOBIN: 13.8 G/DL (ref 12–16)
MCH RBC QN AUTO: 28.6 PG (ref 26–34)
MCHC RBC AUTO-ENTMCNC: 31.6 G/DL (ref 31–36)
MCV RBC AUTO: 90.6 FL (ref 80–100)
PDW BLD-RTO: 14.7 % (ref 12.4–15.4)
PLATELET # BLD: 216 K/UL (ref 135–450)
PMV BLD AUTO: 9.2 FL (ref 5–10.5)
POTASSIUM SERPL-SCNC: 4.6 MMOL/L (ref 3.5–5.1)
RBC # BLD: 4.84 M/UL (ref 4–5.2)
SODIUM BLD-SCNC: 140 MMOL/L (ref 136–145)
TOTAL PROTEIN: 6.6 G/DL (ref 6.4–8.2)
TSH SERPL DL<=0.05 MIU/L-ACNC: 1.72 UIU/ML (ref 0.27–4.2)
WBC # BLD: 7.5 K/UL (ref 4–11)

## 2023-02-06 PROCEDURE — 99214 OFFICE O/P EST MOD 30 MIN: CPT | Performed by: FAMILY MEDICINE

## 2023-02-06 PROCEDURE — 1123F ACP DISCUSS/DSCN MKR DOCD: CPT | Performed by: FAMILY MEDICINE

## 2023-02-06 RX ORDER — DONEPEZIL HYDROCHLORIDE 10 MG/1
20 TABLET, FILM COATED ORAL DAILY
Qty: 60 TABLET | Refills: 3 | Status: SHIPPED | OUTPATIENT
Start: 2023-02-06 | End: 2023-02-06

## 2023-02-06 RX ORDER — DONEPEZIL HYDROCHLORIDE 10 MG/1
20 TABLET, FILM COATED ORAL DAILY
Qty: 60 TABLET | Refills: 3 | Status: SHIPPED | OUTPATIENT
Start: 2023-02-06

## 2023-02-06 NOTE — TELEPHONE ENCOUNTER
Pharmacy calling to get clarification on patient's Donepezil. States the prescription was sent with two different instructions and they are needing clarification on how the patient should take the medication. Please advise.

## 2023-02-06 NOTE — PROGRESS NOTES
Subjective:      Patient ID: Ginny Jensen is a 82 y.o. female. CC: Patient presents for evaluation of progressive memory loss. HPI Patient presents for having problems with memory issues in accompaniment of daughter. She states she feels lost at times and can't remember where shes out or what she needs to do. She has been getting out of breath easily also. She states feels off balance. Patient was getting dizzy and off balance in the waiting room. Patient states she feels fuzzy headed and her mind seems \"busy\". Patient tries to keep her self very active but she is having difficulty of short-term memory issues and driving. She continues to live by herself with some family assistance. She has been very good about taking her medication on a daily basis using a Mediset. Patient's not been taking the BuSpar for quite some many years. She feels her anxiety symptoms are controlled. Review of Systems    Current Outpatient Medications on File Prior to Visit   Medication Sig Dispense Refill    estradiol (ESTRACE) 0.1 MG/GM vaginal cream daily as needed      levothyroxine (SYNTHROID) 100 MCG tablet Take 1 tablet by mouth Daily 90 tablet 3    aspirin 81 MG tablet Take 81 mg by mouth daily      docusate sodium (DULCOLAX) 100 MG capsule Take 100 mg by mouth daily      Ibuprofen-diphenhydrAMINE HCl (ADVIL PM) 200-25 MG CAPS Take 1 tablet by mouth nightly        No current facility-administered medications on file prior to visit. No Known Allergies     Objective:   Physical Exam  Constitutional:       General: She is not in acute distress. Appearance: She is well-developed. HENT:      Right Ear: Tympanic membrane normal.      Left Ear: Tympanic membrane normal.      Nose: Nose normal.      Mouth/Throat:      Pharynx: Uvula midline. Neck:      Vascular: No carotid bruit. Cardiovascular:      Rate and Rhythm: Normal rate and regular rhythm.       Pulses:           Dorsalis pedis pulses are 2+ on the right side and 2+ on the left side. Posterior tibial pulses are 2+ on the right side and 2+ on the left side. Heart sounds: Normal heart sounds. No murmur heard. No friction rub. No gallop. Pulmonary:      Effort: Pulmonary effort is normal.      Breath sounds: Normal breath sounds. No decreased breath sounds. Musculoskeletal:         General: No tenderness. Normal range of motion. Cervical back: Neck supple. Right lower leg: No edema. Left lower leg: No edema. Lymphadenopathy:      Cervical: No cervical adenopathy. Neurological:      Mental Status: She is alert and oriented to person, place, and time. Sensory: Sensation is intact. Motor: Motor function is intact. Psychiatric:         Mood and Affect: Mood and affect normal.         Speech: Speech normal.         Behavior: Behavior is cooperative. Cognition and Memory: Cognition is impaired. She exhibits impaired recent memory. She does not exhibit impaired remote memory. Assessment:      Patty Rangel was seen today for memory loss. Diagnoses and all orders for this visit:    Alzheimer's disease, unspecified    Memory change  -     Comprehensive Metabolic Panel; Future  -     CBC; Future    Hypothyroidism, unspecified type  -     TSH; Future    SOB (shortness of breath)  -     Comprehensive Metabolic Panel; Future  -     CBC; Future  -     XR CHEST STANDARD (2 VW); Future    Anxiety    Other orders  -     Discontinue: donepezil (ARICEPT) 10 MG tablet; Take 2 tablets by mouth daily TAKE ONE TABLET BY MOUTH ONCE NIGHTLY  -     donepezil (ARICEPT) 10 MG tablet; Take 2 tablets by mouth daily          Plan:      Pursue laboratory testing to rule out any other metabolic etiologies cause her memory issues. She has had issues for over a year now.     Adjustment of donezepil to a higher dose    We did discuss adding additional salt to her diet would be beneficial regards to low blood pressure    Encourage daughter continue with good family support    RTC 3 months for annual Medicare visit        Please note that this chart was generated using Dragon dictation software. Although every effort was made to ensure the accuracy of this automated transcription, some errors in transcription may have occurred.

## 2023-04-11 PROBLEM — U09.9 COVID-19 LONG HAULER MANIFESTING CHRONIC DYSPNEA: Status: ACTIVE | Noted: 2023-04-11

## 2023-04-11 PROBLEM — R06.09 COVID-19 LONG HAULER MANIFESTING CHRONIC DYSPNEA: Status: ACTIVE | Noted: 2023-04-11

## 2023-05-09 ENCOUNTER — OFFICE VISIT (OUTPATIENT)
Dept: FAMILY MEDICINE CLINIC | Age: 88
End: 2023-05-09

## 2023-05-09 VITALS
TEMPERATURE: 97.8 F | WEIGHT: 157.13 LBS | SYSTOLIC BLOOD PRESSURE: 122 MMHG | HEART RATE: 72 BPM | DIASTOLIC BLOOD PRESSURE: 70 MMHG | RESPIRATION RATE: 12 BRPM | BODY MASS INDEX: 26.15 KG/M2

## 2023-05-09 DIAGNOSIS — G30.9 ALZHEIMER'S DISEASE, UNSPECIFIED (CODE) (HCC): ICD-10-CM

## 2023-05-09 DIAGNOSIS — G60.3 IDIOPATHIC PROGRESSIVE NEUROPATHY: ICD-10-CM

## 2023-05-09 DIAGNOSIS — U09.9 COVID-19 LONG HAULER MANIFESTING CHRONIC DYSPNEA: ICD-10-CM

## 2023-05-09 DIAGNOSIS — R06.09 COVID-19 LONG HAULER MANIFESTING CHRONIC DYSPNEA: ICD-10-CM

## 2023-05-09 DIAGNOSIS — Z00.00 MEDICARE ANNUAL WELLNESS VISIT, SUBSEQUENT: Primary | ICD-10-CM

## 2023-05-09 SDOH — ECONOMIC STABILITY: HOUSING INSECURITY
IN THE LAST 12 MONTHS, WAS THERE A TIME WHEN YOU DID NOT HAVE A STEADY PLACE TO SLEEP OR SLEPT IN A SHELTER (INCLUDING NOW)?: NO

## 2023-05-09 SDOH — ECONOMIC STABILITY: INCOME INSECURITY: HOW HARD IS IT FOR YOU TO PAY FOR THE VERY BASICS LIKE FOOD, HOUSING, MEDICAL CARE, AND HEATING?: NOT HARD AT ALL

## 2023-05-09 SDOH — ECONOMIC STABILITY: FOOD INSECURITY: WITHIN THE PAST 12 MONTHS, THE FOOD YOU BOUGHT JUST DIDN'T LAST AND YOU DIDN'T HAVE MONEY TO GET MORE.: NEVER TRUE

## 2023-05-09 SDOH — ECONOMIC STABILITY: FOOD INSECURITY: WITHIN THE PAST 12 MONTHS, YOU WORRIED THAT YOUR FOOD WOULD RUN OUT BEFORE YOU GOT MONEY TO BUY MORE.: NEVER TRUE

## 2023-05-09 ASSESSMENT — PATIENT HEALTH QUESTIONNAIRE - PHQ9
SUM OF ALL RESPONSES TO PHQ QUESTIONS 1-9: 0
2. FEELING DOWN, DEPRESSED OR HOPELESS: 0
SUM OF ALL RESPONSES TO PHQ9 QUESTIONS 1 & 2: 0
SUM OF ALL RESPONSES TO PHQ QUESTIONS 1-9: 0
1. LITTLE INTEREST OR PLEASURE IN DOING THINGS: 0
SUM OF ALL RESPONSES TO PHQ QUESTIONS 1-9: 0
SUM OF ALL RESPONSES TO PHQ QUESTIONS 1-9: 0

## 2023-05-09 ASSESSMENT — LIFESTYLE VARIABLES
HOW OFTEN DO YOU HAVE A DRINK CONTAINING ALCOHOL: NEVER
HOW MANY STANDARD DRINKS CONTAINING ALCOHOL DO YOU HAVE ON A TYPICAL DAY: PATIENT DOES NOT DRINK

## 2023-05-09 NOTE — PROGRESS NOTES
Medicare Annual Wellness Visit    Stephan Bhandari is here for Medicare AWV    Assessment & Plan   Medicare annual wellness visit, subsequent  COVID-19 long hauler manifesting chronic dyspnea  Idiopathic progressive neuropathy  Alzheimer's disease, unspecified    Recommendations for Preventive Services Due: see orders and patient instructions/AVS.  Recommended screening schedule for the next 5-10 years is provided to the patient in written form: see Patient Instructions/AVS.     Return in about 6 months (around 11/9/2023). Subjective   The following acute and/or chronic problems were also addressed today:  Patient resents for annual Medicare visit and follow-up of chronic health issues. She is accompanied by her daughter today. The peripheral neuropathy symptoms that she had of recent date have resolved. Her chest x-ray did demonstrate progressive COVID involvement causing pulmonary fibrosis. Patient still can go to the gym and exercise for 30 minutes several times a week on a treadmill and doing okay but she has issues with steps. She continues to be very active. She does have issues with short-term memory. She has a very supportive family. She is able to still live independently with family assistance. Jean Galanellen was seen today for medicare awv. Diagnoses and all orders for this visit:    Medicare annual wellness visit, subsequent    COVID-19 long hauler manifesting chronic dyspnea    Idiopathic progressive neuropathy    Alzheimer's disease, unspecified      Reviewed labs and test results with patient. Maintain current medications. Discussed that the peripheral neuropathy symptoms will recur we could either start her on gabapentin medication or Cymbalta. For the progressive chronic dyspnea secondary to COVID infection recommend patient continue with her exercise 2 or 3 times a week as she has been doing for diaphragm strengthening. She will be due for a pneumonia booster in the fall.   Her last

## 2023-05-22 ENCOUNTER — TELEPHONE (OUTPATIENT)
Dept: FAMILY MEDICINE CLINIC | Age: 88
End: 2023-05-22

## 2023-05-22 NOTE — TELEPHONE ENCOUNTER
[facility] sent this message below in daughter's chart, but is about patient's appointments. Claudius Cranker, MA  [facility] [facility] Clinical Staff  Subject: Message to Provider     QUESTIONS   Information for Provider? Mom is a patient of Dr. Shannan Castaneda   [birth year]. Heath Abernathy needs work excuses for all dates of service for her mom. Please call patient when ready will  at office. ---------------------------------------------------------------------------   --------------   Anna [facility]   [phone]; OK to leave message on voicemail   ---------------------------------------------------------------------------   --------------   SCRIPT ANSWERS   Relationship to Patient?  Self

## 2023-05-22 NOTE — TELEPHONE ENCOUNTER
Rhea Rea needs a work note for when she was here with her mom on 2/6/23 for her appt. She was the one that brought Jason Espinoza to her appt.

## 2023-07-07 ENCOUNTER — OFFICE VISIT (OUTPATIENT)
Dept: FAMILY MEDICINE CLINIC | Age: 88
End: 2023-07-07

## 2023-07-07 VITALS
DIASTOLIC BLOOD PRESSURE: 70 MMHG | HEART RATE: 80 BPM | BODY MASS INDEX: 25.63 KG/M2 | TEMPERATURE: 97.9 F | WEIGHT: 154 LBS | SYSTOLIC BLOOD PRESSURE: 126 MMHG | OXYGEN SATURATION: 97 %

## 2023-07-07 DIAGNOSIS — S81.811A LACERATION OF RIGHT LOWER EXTREMITY, INITIAL ENCOUNTER: Primary | ICD-10-CM

## 2023-07-07 RX ORDER — CEPHALEXIN 500 MG/1
500 CAPSULE ORAL 3 TIMES DAILY
Qty: 21 CAPSULE | Refills: 0 | Status: SHIPPED | OUTPATIENT
Start: 2023-07-07 | End: 2023-07-14

## 2023-10-05 RX ORDER — LEVOTHYROXINE SODIUM 0.1 MG/1
TABLET ORAL
Qty: 90 TABLET | Refills: 0 | Status: SHIPPED | OUTPATIENT
Start: 2023-10-05

## 2023-11-07 NOTE — PROGRESS NOTES
"Subjective:     Chief Complaint   Patient presents with    Nasal Swelling     Nose dry and swollen, burning when breathing, pain to the touch, x 1 week, negative COVID test at home          HPI:   Naomi presents today with     Nose pain  Patient presents today for concern about pain near her nostrils bilaterally.  Patient notes that she did run into a wall and hit her nose but pain did not occur until 2 days later.  Patient has had persistent discomfort x1 week.  Patient notes sensitive to palpation of nostrils.  Patient notes that her nose feels clogged.  Patient has not tried any treatments.  Patient does note symptoms feel better after getting out of the shower.  Patient notes that she will congestion has bloody discoloration.  No nosebleeds.    Obesity  Patient continues on semaglutide 1 mg for obesity.  Patient continues to follow with endocrinology.  Patient notes that semaglutide continues to control appetite but does not see significant weight loss.  Patient is limited in exercise due to recent tear of medial meniscus.  Discussed increasing dose but patient okay on current dose, will continue to monitor.      ROS:  Gen: no fevers/chills  Pulm: no sob, no cough  CV: no chest pain, no palpitations  GI: no nausea/vomiting, no diarrhea      Objective:     Exam:  /82   Pulse 86   Temp 36.2 °C (97.1 °F) (Temporal)   Resp 16   Ht 1.6 m (5' 3\")   Wt 95 kg (209 lb 7 oz)   SpO2 96%   BMI 37.10 kg/m²  Body mass index is 37.1 kg/m².    Gen: Alert and oriented, No apparent distress.  Neck: Neck is supple without lymphadenopathy.  Lungs: Normal effort, CTA bilaterally, no wheezes, rhonchi, or rales  CV: Regular rate and rhythm. No murmurs, rubs, or gallops.  Ext: No clubbing, cyanosis, edema.  Nose: Nose midline, no swelling, bruising or obvious abnormality on physical exam.  Patient tender to palpation of bilateral nostrils.  No obstruction or congestion noted in nose, mucus thin.    Assessment & Plan: " Pt very dizzy and desat to 82 on 3L when up to bedside commode. Pt now back in bed sating at 94%.     41 y.o. female with the following -     1. Nasal pain  Acute, stable.  Unclear etiology.  Recommend patient trial Flonase and antihistamine.  Discussed increasing humidity with a humidifier.  Referral placed to ENT if no improvement.  - Referral to ENT  - fluticasone (FLONASE) 50 MCG/ACT nasal spray; Administer 1 Spray into affected nostril(S) every day.  Dispense: 16 g; Refill: 0    2. Need for vaccination  - Tdap Vaccine =>6YO IM  - Hepatitis A Vaccine Adult 19+  - Hepatitis B Vaccine Adult 20+     Return in about 6 months (around 5/7/2024).    Please note that this dictation was created using voice recognition software. I have made every reasonable attempt to correct obvious errors, but I expect that there are errors of grammar and possibly content that I did not discover before finalizing the note.

## 2023-11-13 RX ORDER — DONEPEZIL HYDROCHLORIDE 10 MG/1
20 TABLET, FILM COATED ORAL DAILY
Qty: 180 TABLET | Refills: 0 | Status: SHIPPED | OUTPATIENT
Start: 2023-11-13

## 2023-11-13 NOTE — TELEPHONE ENCOUNTER
donepezil (ARICEPT) 10 MG tablet       [facility] [id number] [address], [address] [place] [address] 20   [address] [facility], [address] [place] [address]  [id number]   Phone:  [phone]  Fax:  [phone]

## 2023-12-22 PROBLEM — I87.2 VENOUS INSUFFICIENCY OF BOTH LOWER EXTREMITIES: Status: ACTIVE | Noted: 2023-12-22

## 2024-02-12 ENCOUNTER — APPOINTMENT (OUTPATIENT)
Dept: GENERAL RADIOLOGY | Age: 89
End: 2024-02-12
Payer: COMMERCIAL

## 2024-02-12 ENCOUNTER — OFFICE VISIT (OUTPATIENT)
Dept: FAMILY MEDICINE CLINIC | Age: 89
End: 2024-02-12
Payer: COMMERCIAL

## 2024-02-12 ENCOUNTER — HOSPITAL ENCOUNTER (INPATIENT)
Age: 89
LOS: 5 days | Discharge: HOME | End: 2024-02-17
Attending: EMERGENCY MEDICINE | Admitting: INTERNAL MEDICINE
Payer: COMMERCIAL

## 2024-02-12 ENCOUNTER — APPOINTMENT (OUTPATIENT)
Dept: CT IMAGING | Age: 89
End: 2024-02-12
Payer: COMMERCIAL

## 2024-02-12 VITALS — OXYGEN SATURATION: 79 % | HEART RATE: 102 BPM | TEMPERATURE: 98.2 F

## 2024-02-12 DIAGNOSIS — R06.2 WHEEZING: ICD-10-CM

## 2024-02-12 DIAGNOSIS — J20.9 ACUTE BRONCHITIS, UNSPECIFIED ORGANISM: ICD-10-CM

## 2024-02-12 DIAGNOSIS — R06.02 SHORTNESS OF BREATH: Primary | ICD-10-CM

## 2024-02-12 DIAGNOSIS — J18.9 MULTIFOCAL PNEUMONIA: Primary | ICD-10-CM

## 2024-02-12 DIAGNOSIS — R00.0 TACHYCARDIA: ICD-10-CM

## 2024-02-12 DIAGNOSIS — J96.01 ACUTE RESPIRATORY FAILURE WITH HYPOXIA (HCC): ICD-10-CM

## 2024-02-12 LAB
ALBUMIN SERPL-MCNC: 3.6 G/DL (ref 3.4–5)
ALBUMIN/GLOB SERPL: 1 {RATIO} (ref 1.1–2.2)
ALP SERPL-CCNC: 93 U/L (ref 40–129)
ALT SERPL-CCNC: 9 U/L (ref 10–40)
ANION GAP SERPL CALCULATED.3IONS-SCNC: 11 MMOL/L (ref 3–16)
AST SERPL-CCNC: 23 U/L (ref 15–37)
BASOPHILS # BLD: 0.1 K/UL (ref 0–0.2)
BASOPHILS NFR BLD: 1 %
BILIRUB SERPL-MCNC: 0.5 MG/DL (ref 0–1)
BUN SERPL-MCNC: 10 MG/DL (ref 7–20)
CALCIUM SERPL-MCNC: 9.4 MG/DL (ref 8.3–10.6)
CHLORIDE SERPL-SCNC: 100 MMOL/L (ref 99–110)
CO2 SERPL-SCNC: 24 MMOL/L (ref 21–32)
CREAT SERPL-MCNC: 0.7 MG/DL (ref 0.6–1.2)
D DIMER: 1.09 UG/ML FEU (ref 0–0.6)
DEPRECATED RDW RBC AUTO: 15 % (ref 12.4–15.4)
EOSINOPHIL # BLD: 0.4 K/UL (ref 0–0.6)
EOSINOPHIL NFR BLD: 5.8 %
FLUAV RNA RESP QL NAA+PROBE: NOT DETECTED
FLUBV RNA RESP QL NAA+PROBE: NOT DETECTED
GFR SERPLBLD CREATININE-BSD FMLA CKD-EPI: >60 ML/MIN/{1.73_M2}
GLUCOSE SERPL-MCNC: 110 MG/DL (ref 70–99)
HCT VFR BLD AUTO: 42.1 % (ref 36–48)
HGB BLD-MCNC: 14 G/DL (ref 12–16)
INR PPP: 1.03 (ref 0.84–1.16)
LACTATE BLDV-SCNC: 1.7 MMOL/L (ref 0.4–1.9)
LYMPHOCYTES # BLD: 1.2 K/UL (ref 1–5.1)
LYMPHOCYTES NFR BLD: 14.9 %
MCH RBC QN AUTO: 29.5 PG (ref 26–34)
MCHC RBC AUTO-ENTMCNC: 33.2 G/DL (ref 31–36)
MCV RBC AUTO: 89 FL (ref 80–100)
MONOCYTES # BLD: 0.6 K/UL (ref 0–1.3)
MONOCYTES NFR BLD: 8.3 %
NEUTROPHILS # BLD: 5.4 K/UL (ref 1.7–7.7)
NEUTROPHILS NFR BLD: 70 %
NT-PROBNP SERPL-MCNC: 448 PG/ML (ref 0–449)
PLATELET # BLD AUTO: 257 K/UL (ref 135–450)
PMV BLD AUTO: 8 FL (ref 5–10.5)
POTASSIUM SERPL-SCNC: 4.8 MMOL/L (ref 3.5–5.1)
PROT SERPL-MCNC: 7.2 G/DL (ref 6.4–8.2)
PROTHROMBIN TIME: 13.5 SEC (ref 11.5–14.8)
RBC # BLD AUTO: 4.73 M/UL (ref 4–5.2)
REASON FOR REJECTION: NORMAL
REJECTED TEST: NORMAL
SARS-COV-2 RNA RESP QL NAA+PROBE: NOT DETECTED
SODIUM SERPL-SCNC: 135 MMOL/L (ref 136–145)
TROPONIN, HIGH SENSITIVITY: 9 NG/L (ref 0–14)
WBC # BLD AUTO: 7.7 K/UL (ref 4–11)

## 2024-02-12 PROCEDURE — 83880 ASSAY OF NATRIURETIC PEPTIDE: CPT

## 2024-02-12 PROCEDURE — 84484 ASSAY OF TROPONIN QUANT: CPT

## 2024-02-12 PROCEDURE — 36415 COLL VENOUS BLD VENIPUNCTURE: CPT

## 2024-02-12 PROCEDURE — 93005 ELECTROCARDIOGRAM TRACING: CPT | Performed by: EMERGENCY MEDICINE

## 2024-02-12 PROCEDURE — 1123F ACP DISCUSS/DSCN MKR DOCD: CPT | Performed by: STUDENT IN AN ORGANIZED HEALTH CARE EDUCATION/TRAINING PROGRAM

## 2024-02-12 PROCEDURE — 96367 TX/PROPH/DG ADDL SEQ IV INF: CPT

## 2024-02-12 PROCEDURE — 83605 ASSAY OF LACTIC ACID: CPT

## 2024-02-12 PROCEDURE — 80053 COMPREHEN METABOLIC PANEL: CPT

## 2024-02-12 PROCEDURE — 71045 X-RAY EXAM CHEST 1 VIEW: CPT

## 2024-02-12 PROCEDURE — 87636 SARSCOV2 & INF A&B AMP PRB: CPT

## 2024-02-12 PROCEDURE — 85025 COMPLETE CBC W/AUTO DIFF WBC: CPT

## 2024-02-12 PROCEDURE — 84145 PROCALCITONIN (PCT): CPT

## 2024-02-12 PROCEDURE — 71260 CT THORAX DX C+: CPT

## 2024-02-12 PROCEDURE — 99214 OFFICE O/P EST MOD 30 MIN: CPT | Performed by: STUDENT IN AN ORGANIZED HEALTH CARE EDUCATION/TRAINING PROGRAM

## 2024-02-12 PROCEDURE — 99285 EMERGENCY DEPT VISIT HI MDM: CPT

## 2024-02-12 PROCEDURE — 85379 FIBRIN DEGRADATION QUANT: CPT

## 2024-02-12 PROCEDURE — 96366 THER/PROPH/DIAG IV INF ADDON: CPT

## 2024-02-12 PROCEDURE — 2580000003 HC RX 258: Performed by: EMERGENCY MEDICINE

## 2024-02-12 PROCEDURE — 1200000000 HC SEMI PRIVATE

## 2024-02-12 PROCEDURE — 87040 BLOOD CULTURE FOR BACTERIA: CPT

## 2024-02-12 PROCEDURE — 96365 THER/PROPH/DIAG IV INF INIT: CPT

## 2024-02-12 PROCEDURE — 6360000004 HC RX CONTRAST MEDICATION: Performed by: EMERGENCY MEDICINE

## 2024-02-12 PROCEDURE — 85610 PROTHROMBIN TIME: CPT

## 2024-02-12 PROCEDURE — 6360000002 HC RX W HCPCS: Performed by: EMERGENCY MEDICINE

## 2024-02-12 PROCEDURE — 99291 CRITICAL CARE FIRST HOUR: CPT

## 2024-02-12 RX ORDER — VANCOMYCIN HYDROCHLORIDE 1 G/200ML
1000 INJECTION, SOLUTION INTRAVENOUS ONCE
Status: DISCONTINUED | OUTPATIENT
Start: 2024-02-12 | End: 2024-02-12

## 2024-02-12 RX ADMIN — CEFEPIME 1000 MG: 1 INJECTION, POWDER, FOR SOLUTION INTRAMUSCULAR; INTRAVENOUS at 18:59

## 2024-02-12 RX ADMIN — VANCOMYCIN HYDROCHLORIDE 1250 MG: 1.25 INJECTION, POWDER, LYOPHILIZED, FOR SOLUTION INTRAVENOUS at 19:39

## 2024-02-12 RX ADMIN — IOPAMIDOL 75 ML: 755 INJECTION, SOLUTION INTRAVENOUS at 20:33

## 2024-02-12 ASSESSMENT — PAIN - FUNCTIONAL ASSESSMENT: PAIN_FUNCTIONAL_ASSESSMENT: NONE - DENIES PAIN

## 2024-02-12 ASSESSMENT — LIFESTYLE VARIABLES: HOW OFTEN DO YOU HAVE A DRINK CONTAINING ALCOHOL: NEVER

## 2024-02-12 NOTE — PROGRESS NOTES
NC/AT. Eyes: clear conjunctiva. Ears: TMs normal bilaterally. Nose: normal, mild nasal congestion. Throat: Oropharynx normal.    NECK: Supple, no LAD  RESPIRATORY: Increased work of breathing; high pitched squeak/wheeze heard in ELAINE, rest of lungs CTAB  HEART: Sinus tachycardia, no murmurs  SKIN: Skin color, texture, and turgor normal. No rash.    ASSESSMENT & PLAN:     88 y.o. female presents to the office for a sick visit.    1. Shortness of breath  2. Tachycardia  3. Wheezing  - Patient with a noticeable increased work of breathing but overall appears comfortable and is able to speak in complete sentences.  - Oxygen level initially 79%. This improved to 92% on 2L of supplemental oxygen  - Mild wheeze/high pitched squeak heard in ELAINE on exam  - Given concurrent tachycardia, concerned for possible pulmonary embolism.   - Patient unable to maintain her O2 sat off oxygen, will therefore send to ER. Patient refusing to go by ambulance. We did walk her out to her car and waited until her O2 sat came back up to the low 90s before taking off the oxygen. Patient's daughter to drive patient directly to the ER.        If the patient has a future appointment, it is displayed below:  Future Appointments   Date Time Provider Department Center   5/28/2024  3:00 PM Blane Hale MD [facility] [place] [facility]         Electronically signed by Floresita Fairbanks DO on 2/12/2024 at 5:11 PM

## 2024-02-13 LAB
ANION GAP SERPL CALCULATED.3IONS-SCNC: 10 MMOL/L (ref 3–16)
BASOPHILS # BLD: 0.1 K/UL (ref 0–0.2)
BASOPHILS NFR BLD: 1 %
BUN SERPL-MCNC: 10 MG/DL (ref 7–20)
CALCIUM SERPL-MCNC: 9.2 MG/DL (ref 8.3–10.6)
CHLORIDE SERPL-SCNC: 101 MMOL/L (ref 99–110)
CO2 SERPL-SCNC: 23 MMOL/L (ref 21–32)
CREAT SERPL-MCNC: 0.6 MG/DL (ref 0.6–1.2)
DEPRECATED RDW RBC AUTO: 14.7 % (ref 12.4–15.4)
EKG ATRIAL RATE: 79 BPM
EKG DIAGNOSIS: NORMAL
EKG P-R INTERVAL: 140 MS
EKG Q-T INTERVAL: 364 MS
EKG QRS DURATION: 78 MS
EKG QTC CALCULATION (BAZETT): 417 MS
EKG R AXIS: -33 DEGREES
EKG T AXIS: 19 DEGREES
EKG VENTRICULAR RATE: 79 BPM
EOSINOPHIL # BLD: 0.3 K/UL (ref 0–0.6)
EOSINOPHIL NFR BLD: 3.4 %
GFR SERPLBLD CREATININE-BSD FMLA CKD-EPI: >60 ML/MIN/{1.73_M2}
GLUCOSE SERPL-MCNC: 125 MG/DL (ref 70–99)
HCT VFR BLD AUTO: 40.9 % (ref 36–48)
HGB BLD-MCNC: 13.5 G/DL (ref 12–16)
LYMPHOCYTES # BLD: 0.7 K/UL (ref 1–5.1)
LYMPHOCYTES NFR BLD: 10 %
MAGNESIUM SERPL-MCNC: 2.2 MG/DL (ref 1.8–2.4)
MCH RBC QN AUTO: 29.2 PG (ref 26–34)
MCHC RBC AUTO-ENTMCNC: 32.9 G/DL (ref 31–36)
MCV RBC AUTO: 88.7 FL (ref 80–100)
MONOCYTES # BLD: 0.3 K/UL (ref 0–1.3)
MONOCYTES NFR BLD: 3.4 %
NEUTROPHILS # BLD: 6.2 K/UL (ref 1.7–7.7)
NEUTROPHILS NFR BLD: 82.2 %
PLATELET # BLD AUTO: 247 K/UL (ref 135–450)
PMV BLD AUTO: 8.2 FL (ref 5–10.5)
POTASSIUM SERPL-SCNC: 4.9 MMOL/L (ref 3.5–5.1)
PROCALCITONIN SERPL IA-MCNC: 0.07 NG/ML (ref 0–0.15)
RBC # BLD AUTO: 4.62 M/UL (ref 4–5.2)
SODIUM SERPL-SCNC: 134 MMOL/L (ref 136–145)
TSH SERPL DL<=0.005 MIU/L-ACNC: 0.45 UIU/ML (ref 0.27–4.2)
WBC # BLD AUTO: 7.5 K/UL (ref 4–11)

## 2024-02-13 PROCEDURE — 80048 BASIC METABOLIC PNL TOTAL CA: CPT

## 2024-02-13 PROCEDURE — 84443 ASSAY THYROID STIM HORMONE: CPT

## 2024-02-13 PROCEDURE — 94761 N-INVAS EAR/PLS OXIMETRY MLT: CPT

## 2024-02-13 PROCEDURE — 85025 COMPLETE CBC W/AUTO DIFF WBC: CPT

## 2024-02-13 PROCEDURE — 6370000000 HC RX 637 (ALT 250 FOR IP): Performed by: PHYSICIAN ASSISTANT

## 2024-02-13 PROCEDURE — 83735 ASSAY OF MAGNESIUM: CPT

## 2024-02-13 PROCEDURE — 6360000002 HC RX W HCPCS: Performed by: PHYSICIAN ASSISTANT

## 2024-02-13 PROCEDURE — 36415 COLL VENOUS BLD VENIPUNCTURE: CPT

## 2024-02-13 PROCEDURE — 99223 1ST HOSP IP/OBS HIGH 75: CPT | Performed by: INTERNAL MEDICINE

## 2024-02-13 PROCEDURE — 2700000000 HC OXYGEN THERAPY PER DAY

## 2024-02-13 PROCEDURE — 93010 ELECTROCARDIOGRAM REPORT: CPT | Performed by: INTERNAL MEDICINE

## 2024-02-13 PROCEDURE — 2580000003 HC RX 258: Performed by: PHYSICIAN ASSISTANT

## 2024-02-13 PROCEDURE — 1200000000 HC SEMI PRIVATE

## 2024-02-13 PROCEDURE — 87641 MR-STAPH DNA AMP PROBE: CPT

## 2024-02-13 PROCEDURE — 0202U NFCT DS 22 TRGT SARS-COV-2: CPT

## 2024-02-13 PROCEDURE — 6370000000 HC RX 637 (ALT 250 FOR IP): Performed by: INTERNAL MEDICINE

## 2024-02-13 RX ORDER — DOCUSATE SODIUM 100 MG/1
100 CAPSULE, LIQUID FILLED ORAL DAILY
Status: DISCONTINUED | OUTPATIENT
Start: 2024-02-13 | End: 2024-02-17 | Stop reason: HOSPADM

## 2024-02-13 RX ORDER — SODIUM CHLORIDE 0.9 % (FLUSH) 0.9 %
5-40 SYRINGE (ML) INJECTION EVERY 12 HOURS SCHEDULED
Status: DISCONTINUED | OUTPATIENT
Start: 2024-02-13 | End: 2024-02-17 | Stop reason: HOSPADM

## 2024-02-13 RX ORDER — DONEPEZIL HYDROCHLORIDE 5 MG/1
20 TABLET, FILM COATED ORAL DAILY
Status: DISCONTINUED | OUTPATIENT
Start: 2024-02-13 | End: 2024-02-17 | Stop reason: HOSPADM

## 2024-02-13 RX ORDER — ASPIRIN 81 MG/1
81 TABLET, CHEWABLE ORAL DAILY
Status: DISCONTINUED | OUTPATIENT
Start: 2024-02-13 | End: 2024-02-17 | Stop reason: HOSPADM

## 2024-02-13 RX ORDER — ALBUTEROL SULFATE 2.5 MG/3ML
2.5 SOLUTION RESPIRATORY (INHALATION) EVERY 6 HOURS PRN
Status: DISCONTINUED | OUTPATIENT
Start: 2024-02-13 | End: 2024-02-17 | Stop reason: HOSPADM

## 2024-02-13 RX ORDER — ONDANSETRON 2 MG/ML
4 INJECTION INTRAMUSCULAR; INTRAVENOUS EVERY 6 HOURS PRN
Status: DISCONTINUED | OUTPATIENT
Start: 2024-02-13 | End: 2024-02-17 | Stop reason: HOSPADM

## 2024-02-13 RX ORDER — ENOXAPARIN SODIUM 100 MG/ML
40 INJECTION SUBCUTANEOUS DAILY
Status: DISCONTINUED | OUTPATIENT
Start: 2024-02-13 | End: 2024-02-17 | Stop reason: HOSPADM

## 2024-02-13 RX ORDER — SODIUM CHLORIDE 9 MG/ML
INJECTION, SOLUTION INTRAVENOUS PRN
Status: DISCONTINUED | OUTPATIENT
Start: 2024-02-13 | End: 2024-02-17 | Stop reason: HOSPADM

## 2024-02-13 RX ORDER — IPRATROPIUM BROMIDE AND ALBUTEROL SULFATE 2.5; .5 MG/3ML; MG/3ML
1 SOLUTION RESPIRATORY (INHALATION)
Status: DISCONTINUED | OUTPATIENT
Start: 2024-02-13 | End: 2024-02-13

## 2024-02-13 RX ORDER — FAMOTIDINE 20 MG/1
20 TABLET, FILM COATED ORAL DAILY
Status: DISCONTINUED | OUTPATIENT
Start: 2024-02-13 | End: 2024-02-17 | Stop reason: HOSPADM

## 2024-02-13 RX ORDER — CODEINE PHOSPHATE AND GUAIFENESIN 10; 100 MG/5ML; MG/5ML
5 SOLUTION ORAL EVERY 4 HOURS PRN
Status: DISCONTINUED | OUTPATIENT
Start: 2024-02-13 | End: 2024-02-17 | Stop reason: HOSPADM

## 2024-02-13 RX ORDER — ACETAMINOPHEN 325 MG/1
650 TABLET ORAL EVERY 6 HOURS PRN
Status: DISCONTINUED | OUTPATIENT
Start: 2024-02-13 | End: 2024-02-17 | Stop reason: HOSPADM

## 2024-02-13 RX ORDER — SENNOSIDES A AND B 8.6 MG/1
1 TABLET, FILM COATED ORAL DAILY PRN
Status: DISCONTINUED | OUTPATIENT
Start: 2024-02-13 | End: 2024-02-17 | Stop reason: HOSPADM

## 2024-02-13 RX ORDER — FLUTICASONE PROPIONATE 50 MCG
1 SPRAY, SUSPENSION (ML) NASAL 2 TIMES DAILY
Status: DISCONTINUED | OUTPATIENT
Start: 2024-02-13 | End: 2024-02-17 | Stop reason: HOSPADM

## 2024-02-13 RX ORDER — PREDNISONE 20 MG/1
40 TABLET ORAL DAILY
Status: COMPLETED | OUTPATIENT
Start: 2024-02-15 | End: 2024-02-15

## 2024-02-13 RX ORDER — SODIUM CHLORIDE 0.9 % (FLUSH) 0.9 %
5-40 SYRINGE (ML) INJECTION PRN
Status: DISCONTINUED | OUTPATIENT
Start: 2024-02-13 | End: 2024-02-15

## 2024-02-13 RX ORDER — ACETAMINOPHEN 650 MG/1
650 SUPPOSITORY RECTAL EVERY 6 HOURS PRN
Status: DISCONTINUED | OUTPATIENT
Start: 2024-02-13 | End: 2024-02-17 | Stop reason: HOSPADM

## 2024-02-13 RX ORDER — LEVOTHYROXINE SODIUM 0.1 MG/1
100 TABLET ORAL DAILY
Status: DISCONTINUED | OUTPATIENT
Start: 2024-02-13 | End: 2024-02-17 | Stop reason: HOSPADM

## 2024-02-13 RX ADMIN — LEVOTHYROXINE SODIUM 100 MCG: 0.1 TABLET ORAL at 05:10

## 2024-02-13 RX ADMIN — DONEPEZIL HYDROCHLORIDE 20 MG: 5 TABLET, FILM COATED ORAL at 11:03

## 2024-02-13 RX ADMIN — ENOXAPARIN SODIUM 40 MG: 100 INJECTION SUBCUTANEOUS at 11:02

## 2024-02-13 RX ADMIN — WATER 80 MG: 1 INJECTION INTRAMUSCULAR; INTRAVENOUS; SUBCUTANEOUS at 02:38

## 2024-02-13 RX ADMIN — ASPIRIN 81 MG 81 MG: 81 TABLET ORAL at 11:03

## 2024-02-13 RX ADMIN — SODIUM CHLORIDE, PRESERVATIVE FREE 10 ML: 5 INJECTION INTRAVENOUS at 11:07

## 2024-02-13 RX ADMIN — FLUTICASONE PROPIONATE 1 SPRAY: 50 SPRAY, METERED NASAL at 20:23

## 2024-02-13 RX ADMIN — DOCUSATE SODIUM 100 MG: 100 CAPSULE, LIQUID FILLED ORAL at 11:03

## 2024-02-13 RX ADMIN — FAMOTIDINE 20 MG: 20 TABLET ORAL at 11:04

## 2024-02-13 RX ADMIN — WATER 80 MG: 1 INJECTION INTRAMUSCULAR; INTRAVENOUS; SUBCUTANEOUS at 11:02

## 2024-02-13 RX ADMIN — WATER 80 MG: 1 INJECTION INTRAMUSCULAR; INTRAVENOUS; SUBCUTANEOUS at 19:02

## 2024-02-13 RX ADMIN — SODIUM CHLORIDE, PRESERVATIVE FREE 10 ML: 5 INJECTION INTRAVENOUS at 20:24

## 2024-02-13 NOTE — H&P
[facility] Medicine History & Physical      Patient Name: Olive Mckeon    : 1935    PCP: Blane Hale MD    Date of Service:  Patient seen and examined on 2024     Chief Complaint:  Shortness of breath    History Of Present Illness:    Olive Mckeon is a 88 y.o. female who presented to ED from PCP office for evaluation of shortness of breath.  Patient was seen by PCP today for shortness of breath and nonproductive cough which have been ongoing for the past week.  Patient was noted to be hypoxic in the office with SpO2 80%.  Patient was placed on 2L O2 and SpO2 improved to 92%.  PCP advised patient go to ED via EMS but patient refused.  Patient was accompanied to her daughter's car by staff who made sure she was safely in the care with SpO2 in the 90% before removing oxygen.  Daughter drove patient to ED from PCP office.  SpO2 76% on RA upon arrival to ED.  She was initially placed on 6L O2 and this was titrated down to 4L.  Patient denies fever, chest pain, edema, abdominal pain, nausea, vomiting, diarrhea, constipation, urinary symptoms.  She denies any known sick contacts with similar symptoms.  She denies any other complaints or concerns at this time.  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      Past Medical History:    Patient has a past medical history of Adhesive capsulitis of shoulder, Hypothyroidism, Osteoporosis, unspecified, Routine general medical examination at a health care facility, Seborrheic dermatitis, unspecified, and Unspecified disorder of skin and subcutaneous tissue.    Past Surgical History:    Patient has a past surgical history that includes Cataract removal; Cataract removal; other surgical history; and Colonoscopy.    Medications Prior to Admission:      Prior to Admission medications    Medication Sig Start Date End Date Taking? Authorizing Provider   donepezil (ARICEPT) 10 MG tablet Take 2  pronounced in bilateral lower lobes as well as chronic intralobular septal thickening consistent with interstitial fibrosis.  CXR notable for worsening chronic interstitial lung disease,.  - Patient afebrile.  No leukocytosis.  Procalcitonin 0.07.  Low suspicion for superimposed bacterial pneumonia.  Suspect viral process and/or progression of pulmonary fibrosis  - Check respiratory virus panel  - Solumedrol  - Supplemental O2 PRN  - Pulmonology consulted    Hypothyroidism  - Continue home synthroid    Dementia  - Continue home aricept       DVT prophylaxis: Lovenox  GI prophylaxis: Pepcid  Probiotic if on abx: N/A     Diet: ADULT DIET; Regular  Code Status: Full Code    Consults:  IP CONSULT TO PULMONOLOGY     Disposition:  Admit to Inpatient   ELOS:  Greater than two midnights due to medical therapy     (Please note that portions of this note were completed with a voice recognition program.  Efforts were made to edit the dictations but occasionally words are mis-transcribed.)     Malia Champion PA-C

## 2024-02-13 NOTE — CARE COORDINATION
Discharge Planning:     (CM) reviewed the patient's chart to assess needs. Patient's Readmission Risk Score is 7%. Patient's medical insurance is [employer]. Patient's PCP is YOANA GARZA .   No needs anticipated, at this time. CM team to follow. Staff to inform CM if additional discharge needs arise.     Electronically signed by Varun De Leon on 2/13/24 at 8:50 AM EST

## 2024-02-13 NOTE — PROGRESS NOTES
02/13/24 0800   RT Protocol   History Pulmonary Disease 2   Respiratory pattern 2   Breath sounds 4   Cough 2   Bronchodilator Assessment Score 10

## 2024-02-13 NOTE — ED NOTES
Pt arrived to ER with SOB, cough and low oxgyen 78% on RA.    Patient alert and oriented x4.  GCS 15/15.  Skin appropriate for ethnicity, dry and intact.  No signs of acute distress noted at this time. Regular respiratory pattern, normal respiratory depth, unlabored respirations.   denies pain. Cardiac Rhythm NSR.      Patient placed on a continuous pulse oximetry and telemetry monitoring. Bedside Monitor on with Alarms audible and alarms set.  Patient on cycling blood pressure.     Patient oriented to room and ED throughput process.  Patient educated on all orders, including any medications.  Patient educated on chief complaint/symptoms. Patient encouraged to ask questions regarding care, medications or treatment plan.  Patient aware of how to reach staff with questions/concerns.  Safety measures and Fall risk precautions in place, ED bed locked in lowest position, bed side table within reach, and call light in reach.  Plan of care ongoing.  Patient expresses no other needs at this time.

## 2024-02-13 NOTE — ED PROVIDER NOTES
not returned as of this dictation.    EMERGENCY DEPARTMENT COURSE and DIFFERENTIAL DIAGNOSIS/MDM:   Vitals:    Vitals:    02/12/24 1931 02/1935 02/12/24 1939 02/12/24 1940   BP:       Pulse: 76 72 75 70   Resp: 24 25 17 16   Temp:       TempSrc:       SpO2:  100% 98% 94%   Weight:       Height:               MDM      The patient has remained stable throughout hospital course.  She did well with the nasal cannula on board.  The patient's workup today included a CT of the chest that shows multifocal pneumonia.  Workup was otherwise unremarkable.  Cardiac workup was normal.  EKG was nonischemic.  She is negative for flu and COVID.  Antibiotics have been started and she will be admitted for further care and evaluation.  Lactic acid is pending.    Is this patient to be included in the SEP-1 Core Measure due to severe sepsis or septic shock?   No   Exclusion criteria - the patient is NOT to be included for SEP-1 Core Measure due to:  Infection is not suspected      REASSESSMENT          CRITICAL CARE TIME   Total Critical Care time was 55 minutes, excluding separatelyreportable procedures.  There was a high probability ofclinically significant/life threatening deterioration in the patient's condition which required my urgent intervention.      CONSULTS:  None    PROCEDURES:  Unless otherwise noted below, none     Procedures    FINAL IMPRESSION      1. Multifocal pneumonia    2. Acute respiratory failure with hypoxia (HCC)          DISPOSITION/PLAN   DISPOSITION Decision To Admit 02/12/2024 10:27:02 PM      PATIENT REFERREDTO:  No follow-up provider specified.    DISCHARGEMEDICATIONS:  New Prescriptions    No medications on file     Controlled Substances Monitoring:          No data to display                (Please note that portions of this note were completed with a voice recognition program.  Efforts were made to edit the dictations but occasionally words are mis-transcribed.)    Jalen Rutherford MD

## 2024-02-13 NOTE — ED NOTES
ED TO INPATIENT SBAR HANDOFF    Patient Name: Olive Mckeon   :  1935  88 y.o.   MRN:  [id number]  Preferred Name  Olive  ED Room #:  ED-0005/05  Family/Caregiver Present no   Restraints no   Sitter no   Sepsis Risk Score Sepsis Risk Score: 1.03    Situation  Code Status:Full cde No additional code details.    Allergies: Patient has no known allergies.  Weight: Patient Vitals for the past 96 hrs (Last 3 readings):   Weight   24 1655 68 kg (150 lb)     Arrived from: home  Chief Complaint:   Chief Complaint   Patient presents with    Shortness of Breath     Arrived per family d/t PCP referral for SOB; upon triage SpO2 76% RA; pt placed on 6 liters O2 via NC up to 92%; noticeable SOB started yesterday     Hospital Problem/Diagnosis:  Principal Problem:    Acute respiratory failure with hypoxia (HCC)  Resolved Problems:    * No resolved hospital problems. *    Imaging:   CT CHEST PULMONARY EMBOLISM W CONTRAST   Preliminary Result   1. No evidence of pulmonary embolus.   2. Multilobar ground-glass opacities and consolidations most pronounced in   the lower lobes bilaterally.  If patient has clinical symptoms of infection,   finding would be indicative of pneumonia.  Differential includes   atypical/viral pneumonia.   3. Chronic intra lobular septal thickening.  Differential includes   interstitial fibrosis.   4. Small hiatal hernia.   5. Cholelithiasis.  No evidence of cholecystitis.   6. Age indeterminate compression deformity of T4 results in 20% of height   loss.  No bony fragment retropulsion into the central canal.  This is new   since 2020.   7. Chronic stable T5 compression deformity.         XR CHEST PORTABLE   Final Result   1. Worsening chronic interstitial lung disease; underlying pneumonia cannot   be excluded.           Abnormal labs:   Abnormal Labs Reviewed   D-DIMER, QUANTITATIVE - Abnormal; Notable for the following components:       Result Value    D-Dimer, Quant 1.09 (*)     All  other components within normal limits   COMPREHENSIVE METABOLIC PANEL - Abnormal; Notable for the following components:    Sodium 135 (*)     Glucose 110 (*)     Albumin/Globulin Ratio 1.0 (*)     ALT 9 (*)     All other components within normal limits     Critical values: no     Abnormal Assessment Findings: Multifocal nuemonia, acute respiratory failure with hypoxia    Background  History:   Past Medical History:   Diagnosis Date    Adhesive capsulitis of shoulder     Hypothyroidism     Osteoporosis, unspecified     Routine general medical examination at a health care facility     Seborrheic dermatitis, unspecified     Unspecified disorder of skin and subcutaneous tissue        Assessment    Vitals/MEWS: MEWS Score: 2  Level of Consciousness: Alert (0)   Vitals:    02/12/24 1939 02/12/24 1940 02/12/24 2230 02/12/24 2300   BP:   138/63 (!) 135/58   Pulse: 75 70 78 73   Resp: 17 16 18 24   Temp:       TempSrc:       SpO2: 98% 94% 98% 99%   Weight:       Height:         FiO2 (%):   O2 Flow Rate: O2 Device: Nasal cannula O2 Flow Rate (L/min): 6 L/min  Cardiac Rhythm:    Pain Assessment:  [] Verbal [] Vargas Baker Scale  Pain Scale: Pain Assessment  Pain Assessment: None - Denies Pain  Last documented pain score (0-10 scale)    Last documented pain medication administered:   Mental Status: oriented, alert, coherent, logical, thought processes intact, and able to concentrate and follow conversation  Orientation Level:    NIH Score:    C-SSRS: Risk of Suicide: No Risk  Bedside swallow:    [place] Coma Scale (GCS): [place] Coma Scale  Eye Opening: Spontaneous  Best Verbal Response: Oriented  Best Motor Response: Obeys commands  [place] Coma Scale Score: 15  Active LDA's:   Peripheral IV 02/12/24 Distal;Left Forearm (Active)   Site Assessment Clean, dry & intact 02/12/24 1722   Line Status Blood return noted;Specimen collected 02/12/24 1722   Line Care Connections checked and tightened 02/12/24 1722   Phlebitis Assessment No

## 2024-02-13 NOTE — PLAN OF CARE
Problem: Safety - Adult  Goal: Free from fall injury  2/13/2024 0837 by Sandi Sen, RN  Outcome: Progressing  2/13/2024 0312 by Hazel Coleman RN  Outcome: Progressing     Problem: ABCDS Injury Assessment  Goal: Absence of physical injury  2/13/2024 0837 by Sandi Sen, RN  Outcome: Progressing  2/13/2024 0312 by Hazel Coleman RN  Outcome: Progressing

## 2024-02-13 NOTE — ACP (ADVANCE CARE PLANNING)
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  Advanced Care Planning Documents:  Completed advances directives, advanced directives in chart.      Electronically signed by ARELI WEEKS MD on 2/13/2024 at 6:56 PM

## 2024-02-13 NOTE — PROGRESS NOTES
4 Eyes Skin Assessment     NAME:  Olive Mckeon  YOB: 1935  MEDICAL RECORD NUMBER:  [id number]    The patient is being assessed for  Admission    I agree that at least one RN has performed a thorough Head to Toe Skin Assessment on the patient. ALL assessment sites listed below have been assessed.      Areas assessed by both nurses:    Head, Face, Ears, Shoulders, Back, Chest, Arms, Elbows, Hands, Sacrum. Buttock, Coccyx, Ischium, Legs. Feet and Heels, and Under Medical Devices         Does the Patient have a Wound? No noted wound(s)       Star Prevention initiated by RN: No  Wound Care Orders initiated by RN: No    Pressure Injury (Stage 3,4, Unstageable, DTI, NWPT, and Complex wounds) if present, place Wound referral order by RN under : No    New Ostomies, if present place, Ostomy referral order under : No     Nurse 1 eSignature: Electronically signed by Hazel Coleman RN on 2/13/24 at 3:15 AM EST    **SHARE this note so that the co-signing nurse can place an eSignature**    Nurse 2 eSignature: Electronically signed by Angelique Cardenas RN on 2/13/24 at 6:03 AM EST

## 2024-02-13 NOTE — PROGRESS NOTES
Assessment complete. VSS. Patient resting in bed. Respirations even and easy. Call light in reach. Fall precautions in place. No needs expressed at this time. Will continue to monitor.      No

## 2024-02-13 NOTE — CONSULTS
PULMONARY AND CRITICAL CARE MEDICINE CONSULTATION NOTE    CONSULTING PHYSICIAN:  Malia Champion PA-C     ADMISSION DATE: 2/12/2024  ADMISSION DIAGNOSIS: Acute respiratory failure with hypoxia (HCC) [J96.01]  Multifocal pneumonia [J18.9]    REASON FOR CONSULT:   Chief Complaint   Patient presents with    Shortness of Breath     Arrived per family d/t PCP referral for SOB; upon triage SpO2 76% RA; pt placed on 6 liters O2 via NC up to 92%; noticeable SOB started yesterday       DATE OF CONSULT: 2/12/2024    HISTORY OF PRESENT ILLNESS: 88 y.o. year old female with a past medical history significant for COVID related pulmonary fibrosis, mild dementia, hypothyroidism presented to the hospital with shortness of breath.  Patient had earlier presented to her primary care physician's office for shortness of breath and nonproductive cough which has been going on for past 4 to 5 days.  She was seen to be hypoxic in the office.  Placed on 2 L/min of oxygen and referred to [facility].  In the ER she was placed on 6 L/min of oxygen supplementation with which her oxygen levels quickly went above 90%.  She has been having dry nonproductive cough for last 4 to 5 days.  Does have some postnasal drip.  Denies any nausea, vomiting, abdominal pain, discolored expectoration, hemoptysis.  No recent travel or sick contacts.  Worked as an  and upad majority of her life.  Retired at the age of 67.  Lifelong non-smoker.  Did get exposed to secondhand smoking in the past through her .  Tries to stay active and goes to [employer] twice a week.    At the time of my evaluation patient is sitting in her bed in no apparent respiratory distress.  Reports that her breathing is slightly better.  Intermittent cough persists.  Currently on 2 L/min of oxygen supplementation saturating in mid 90s.    REVIEW OF SYSTEMS:     CONSTITUTIONAL SYMPTOMS: The patient denies fever, fatigue, night sweats, weight loss or

## 2024-02-13 NOTE — PROGRESS NOTES
HOSPITALISTS PROGRESS NOTE    2/13/2024 8:14 AM        Name: Olive Mckeon .              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    Interval history:   Pt seen and examined today   Overnight events noted and interval ancillary notes reviewed.  Currently on 2 L nasal cannula satting around 94%; wean as tolerated keep sat above 92%  Afebrile overnight, WBCs WNL. COVID and influenza combo PCR negative  Pt reported SOB but denied any fevers, chills, chest pain, palpitations, nausea vomiting abdominal pain      Assessment & Plan:     Acute hypoxic respiratory failure/possible acute bronchitis.   Hypoxic on admission with sats around 76% on RA.  Afebrile with normal white count and procalcitonin  COVID 19 & influenza combo PCR negative.  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  Stable     Hypothyroidism; continue Synthroid     Mild cognitive impairment; continue home dose of Aricept        DVT PPX: Lovenox  Code:Full Code    Disposition: Once acute medical issues have resolved    Current Medications  aspirin chewable tablet 81 mg, Daily  docusate sodium (COLACE) capsule 100 mg, Daily  donepezil (ARICEPT) tablet 20 mg,

## 2024-02-14 LAB
ANION GAP SERPL CALCULATED.3IONS-SCNC: 9 MMOL/L (ref 3–16)
BASOPHILS # BLD: 0 K/UL (ref 0–0.2)
BASOPHILS NFR BLD: 0.2 %
BUN SERPL-MCNC: 16 MG/DL (ref 7–20)
CALCIUM SERPL-MCNC: 9.4 MG/DL (ref 8.3–10.6)
CHLORIDE SERPL-SCNC: 103 MMOL/L (ref 99–110)
CO2 SERPL-SCNC: 25 MMOL/L (ref 21–32)
CREAT SERPL-MCNC: 0.6 MG/DL (ref 0.6–1.2)
DEPRECATED RDW RBC AUTO: 14.5 % (ref 12.4–15.4)
EOSINOPHIL # BLD: 0 K/UL (ref 0–0.6)
EOSINOPHIL NFR BLD: 0.1 %
GFR SERPLBLD CREATININE-BSD FMLA CKD-EPI: >60 ML/MIN/{1.73_M2}
GLUCOSE SERPL-MCNC: 153 MG/DL (ref 70–99)
HCT VFR BLD AUTO: 39.5 % (ref 36–48)
HGB BLD-MCNC: 13.2 G/DL (ref 12–16)
LYMPHOCYTES # BLD: 0.8 K/UL (ref 1–5.1)
LYMPHOCYTES NFR BLD: 7.9 %
MCH RBC QN AUTO: 29.1 PG (ref 26–34)
MCHC RBC AUTO-ENTMCNC: 33.3 G/DL (ref 31–36)
MCV RBC AUTO: 87.4 FL (ref 80–100)
MONOCYTES # BLD: 0.5 K/UL (ref 0–1.3)
MONOCYTES NFR BLD: 4.3 %
MRSA DNA SPEC QL NAA+PROBE: NORMAL
NEUTROPHILS # BLD: 9.3 K/UL (ref 1.7–7.7)
NEUTROPHILS NFR BLD: 87.5 %
PLATELET # BLD AUTO: 272 K/UL (ref 135–450)
PMV BLD AUTO: 8 FL (ref 5–10.5)
POTASSIUM SERPL-SCNC: 4.6 MMOL/L (ref 3.5–5.1)
RBC # BLD AUTO: 4.52 M/UL (ref 4–5.2)
SODIUM SERPL-SCNC: 137 MMOL/L (ref 136–145)
WBC # BLD AUTO: 10.6 K/UL (ref 4–11)

## 2024-02-14 PROCEDURE — 6370000000 HC RX 637 (ALT 250 FOR IP): Performed by: PHYSICIAN ASSISTANT

## 2024-02-14 PROCEDURE — 97530 THERAPEUTIC ACTIVITIES: CPT

## 2024-02-14 PROCEDURE — 80048 BASIC METABOLIC PNL TOTAL CA: CPT

## 2024-02-14 PROCEDURE — 36415 COLL VENOUS BLD VENIPUNCTURE: CPT

## 2024-02-14 PROCEDURE — 97161 PT EVAL LOW COMPLEX 20 MIN: CPT

## 2024-02-14 PROCEDURE — 97116 GAIT TRAINING THERAPY: CPT

## 2024-02-14 PROCEDURE — 99233 SBSQ HOSP IP/OBS HIGH 50: CPT | Performed by: INTERNAL MEDICINE

## 2024-02-14 PROCEDURE — 97165 OT EVAL LOW COMPLEX 30 MIN: CPT

## 2024-02-14 PROCEDURE — 6360000002 HC RX W HCPCS: Performed by: PHYSICIAN ASSISTANT

## 2024-02-14 PROCEDURE — 2580000003 HC RX 258: Performed by: PHYSICIAN ASSISTANT

## 2024-02-14 PROCEDURE — 97535 SELF CARE MNGMENT TRAINING: CPT

## 2024-02-14 PROCEDURE — 1200000000 HC SEMI PRIVATE

## 2024-02-14 PROCEDURE — 94680 O2 UPTK RST&XERS DIR SIMPLE: CPT

## 2024-02-14 PROCEDURE — 85025 COMPLETE CBC W/AUTO DIFF WBC: CPT

## 2024-02-14 RX ADMIN — ENOXAPARIN SODIUM 40 MG: 100 INJECTION SUBCUTANEOUS at 08:39

## 2024-02-14 RX ADMIN — SODIUM CHLORIDE, PRESERVATIVE FREE 10 ML: 5 INJECTION INTRAVENOUS at 21:05

## 2024-02-14 RX ADMIN — WATER 80 MG: 1 INJECTION INTRAMUSCULAR; INTRAVENOUS; SUBCUTANEOUS at 04:15

## 2024-02-14 RX ADMIN — DOCUSATE SODIUM 100 MG: 100 CAPSULE, LIQUID FILLED ORAL at 08:39

## 2024-02-14 RX ADMIN — WATER 80 MG: 1 INJECTION INTRAMUSCULAR; INTRAVENOUS; SUBCUTANEOUS at 19:01

## 2024-02-14 RX ADMIN — FLUTICASONE PROPIONATE 1 SPRAY: 50 SPRAY, METERED NASAL at 21:04

## 2024-02-14 RX ADMIN — SODIUM CHLORIDE, PRESERVATIVE FREE 10 ML: 5 INJECTION INTRAVENOUS at 08:40

## 2024-02-14 RX ADMIN — FLUTICASONE PROPIONATE 1 SPRAY: 50 SPRAY, METERED NASAL at 08:41

## 2024-02-14 RX ADMIN — LEVOTHYROXINE SODIUM 100 MCG: 0.1 TABLET ORAL at 05:59

## 2024-02-14 RX ADMIN — ASPIRIN 81 MG 81 MG: 81 TABLET ORAL at 08:40

## 2024-02-14 RX ADMIN — WATER 80 MG: 1 INJECTION INTRAMUSCULAR; INTRAVENOUS; SUBCUTANEOUS at 08:40

## 2024-02-14 RX ADMIN — DONEPEZIL HYDROCHLORIDE 20 MG: 5 TABLET, FILM COATED ORAL at 08:39

## 2024-02-14 RX ADMIN — FAMOTIDINE 20 MG: 20 TABLET ORAL at 08:40

## 2024-02-14 NOTE — PROGRESS NOTES
02/14/24 1549   Resting (Room Air)   SpO2 94   HR 60   During Walk (Room Air)   SpO2 86   HR 72   Walk/Assistance Device Ambulation   Rate of Dyspnea 0   During Walk (On O2)   SpO2 89   HR 71   O2 Device Nasal cannula   O2 Flow Rate (l/min) 1 l/min   Need Additional O2 Flow Rate Rows Yes   O2 Flow Rate (l/min) 2 l/min   O2 Saturation 91   Walk/Assistance Device Ambulation   Rate of Dyspnea 0   After Walk   SpO2 92   HR 72   O2 Device Nasal cannula   O2 Flow Rate (l/min) 2 l/min   Rate of Dyspnea 0   Comments Patients O2 saturations dropped(1liter) to 84% while getting ready to walk but she recovered quickly with rest. Had to increase to 3 liters to maintain while walking   Does the Patient Qualify for Home O2 Yes   Liter Flow at Rest 1   Liter Flow on Exertion 3   Does the Patient Need Portable Oxygen Tanks Yes

## 2024-02-14 NOTE — CARE COORDINATION
Case Management Assessment  Initial Evaluation    Date/Time of Evaluation: 2/14/2024 3:03 PM  Assessment Completed by: ROSS [place]    If patient is discharged prior to next notation, then this note serves as note for discharge by case management.    Patient Name: Olive Mckeon                   YOB: 1935  Diagnosis: Acute respiratory failure with hypoxia (HCC) [J96.01]  Multifocal pneumonia [J18.9]                   Date / Time: 2/12/2024  4:49 PM    Patient Admission Status: Inpatient   Readmission Risk (Low < 19, Mod (19-27), High > 27): Readmission Risk Score: 8.2    Current PCP: Blane Hale MD  PCP verified by CM?      Chart Reviewed: Yes      History Provided by:    Patient Orientation:      Patient Cognition:      Hospitalization in the last 30 days (Readmission):  No    If yes, Readmission Assessment in  Navigator will be completed.    Advance Directives:      Code Status: Full Code   Patient's Primary Decision Maker is:      Primary Decision Maker: Elva Vickers - [facility] - [phone]    Discharge Planning:    Patient lives with: Alone Type of Home: House  Primary Care Giver:    Patient Support Systems include: Children, Family Members   Current Financial resources:    Current community resources:    Current services prior to admission: None            Current DME:              Type of Home Care services:  None    ADLS  Prior functional level:    Current functional level:      PT AM-PAC: 17 /24  OT AM-PAC: 20 /24    Family can provide assistance at DC:    Would you like Case Management to discuss the discharge plan with any other family members/significant others, and if so, who?    Plans to Return to Present Housing:    Other Identified Issues/Barriers to RETURNING to current housing: Yes  Potential Assistance needed at discharge: N/A            Potential DME:    Patient expects to discharge to: House  Plan for transportation at discharge:      Financial    Payor: [employer] /

## 2024-02-14 NOTE — PROGRESS NOTES
[facility] - Inpatient Rehabilitation Department   Phone: [phone]    Occupational Therapy    [x] Initial Evaluation            [] Daily Treatment Note         [] Discharge Summary      Patient: Olive Mckeon   : 1935   MRN: [id number]   Date of Service:  2024    Admitting Diagnosis:  Acute respiratory failure with hypoxia (HCC)  Current Admission Summary: Olive Mckeon is a 88 y.o. female who presented to ED from PCP office for evaluation of shortness of breath.  Patient was seen by PCP today for shortness of breath and nonproductive cough which have been ongoing for the past week.  Patient was noted to be hypoxic in the office with SpO2 80%.  Patient was placed on 2L O2 and SpO2 improved to 92%.  PCP advised patient go to ED via EMS but patient refused.  Patient was accompanied to her daughter's car by staff who made sure she was safely in the care with SpO2 in the 90% before removing oxygen.  Daughter drove patient to ED from PCP office.  SpO2 76% on RA upon arrival to ED.  She was initially placed on 6L O2 and this was titrated down to 4L.  Patient denies fever, chest pain, edema, abdominal pain, nausea, vomiting, diarrhea, constipation, urinary symptoms.  She denies any known sick contacts with similar symptoms.  She denies any other complaints or concerns at this time.  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     Past Medical History:  has a past medical history of Adhesive capsulitis of shoulder, Hypothyroidism, Osteoporosis, unspecified, Routine general medical examination at a health care facility, Seborrheic dermatitis, unspecified, and Unspecified disorder of skin and subcutaneous tissue.  Past Surgical History:  has a past surgical history that includes Cataract removal; Cataract removal; other surgical history; and Colonoscopy.    Discharge Recommendations: Olive Mckeon scored a 20/24 on the AM-PAC ADL  grab bars in shower, grab bars around toilet, shower chair, hand held shower head  Toilet Height: elevated height  Home Equipment: rolling walker, alert button  Transfer Assistance: Independent without use of device  Ambulation Assistance:Independent without use of device  ADL Assistance: independent with all ADL's  IADL Assistance: independent with homemaking tasks (Dtr wants her to get assistance)  Active :        [x] Yes  [] No  Hand Dominance: [] Left  [] Right  Current Employment: retired.  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  Recent Falls: fell at the [facility]    Examination   Vision:   Vision Gross Assessment: Impaired and Vision Corrective Device: wears glasses at all times  Hearing:   hard of hearing  Perception:   WFL  Observation:   General Observation:  Pt on 1 L O2, on Purewick    Sensation:   denies numbness and tingling  Proprioception:    WFL  Tone:   Normotonic  Coordination Testing:   WFL    ROM:   (B) UE AROM WFL  Strength:   (B) UE strength grossly WFL    Therapist Clinical Decision Making (Complexity): low complexity  Clinical Presentation: evolving      Subjective  General: Pt in semi-martinez's position in bed, very pleasant and agreeable to OT/Pt evaluation. Pt's daughter, Elva, arrived during the session. Elva expressed concern that pt did not get up yesterday and had a Purewick.Nurse Manager notified.  Pain: 0/10  Pain Interventions: not applicable        Activities of Daily Living  Basic Activities of Daily Living  Grooming: setup assistance wash hands with wipe, seated in recliner; unable to tolerate standing at sink  Lower Extremity Dressing: contact guard assistance don/doff pullup; Supervision don/doff socks  Toileting: contact guard assistance.    Pt's SPO2 drops during ADL activities.Verbal cues to sit to remove pullups. Purewick removed.  Instrumental Activities of Daily

## 2024-02-14 NOTE — PROGRESS NOTES
Physician Progress Note      PATIENT:               REBECA RUTHERFORD  [facility] #:                  [id number]  :                       1935  ADMIT DATE:       2024 4:49 PM  DISCH DATE:  RESPONDING  PROVIDER #:        Brittany Muñiz MD          QUERY TEXT:    Patient admitted with SOB. Noted documentation of acute respiratory failure in    HP. In order to support the diagnosis of acute respiratory failure,   please include additional clinical indicators in your documentation.  Or   please document if the diagnosis of acute respiratory failure has been ruled   out after further study.      The medical record reflects the following:  Risk Factors: chronic interstitial lung disease    Clinical Indicators: Per  HP- presented to ED from PCP office for   shortness of breath and nonproductive cough x 1  week.  Patient was noted to   be hypoxic in the office with SpO2 80%.  Patient was placed on 2L O2 and SpO2   improved to 92%. Patient was accompanied to her daughter's car by staff who   made sure she was safely in the care with SpO2 in the 90% before removing   oxygen. SpO2 76% on RA upon arrival to ED.  She was initially placed on 6L O2   and this was titrated down to 4L.  Respiratory:  Clear to auscultation bilaterally without rales, wheezes, or   rhonchi. Normal respiratory effort.  No documentation of increased work of breathing or accessory muscle usage.  No blood gases ordered.    Treatment: supplemental O2,    Acute Respiratory Failure Clinical Indicators per 3[employer] MS-DRG Training Guide and   Quick Reference Guide:  pO2 < 60 mmHg or SpO2 (pulse oximetry) < 91% breathing room air  pCO2 > 50 and pH < 7.35  P/F ratio (pO2 / FIO2) < 300  pO2 decrease or pCO2 increase by 10 mmHg from baseline (if known)  Supplemental oxygen of 40% or more  Presence of respiratory distress, tachypnea, dyspnea, shortness of breath,   wheezing  Unable to speak in complete sentences  Use of accessory muscles to breathe  Extreme

## 2024-02-14 NOTE — PROGRESS NOTES
[facility] - Inpatient Rehabilitation Department   Phone: [phone]    Physical Therapy    [x] Initial Evaluation            [] Daily Treatment Note         [] Discharge Summary      Patient: Olive Mckeon   : 1935   MRN: [id number]   Date of Service:  2024  Admitting Diagnosis: Acute respiratory failure with hypoxia (HCC)  Current Admission Summary: Olive Mckeon is an 88 y.o. year old female with a past medical history significant for COVID related pulmonary fibrosis, mild dementia, hypothyroidism presented to the hospital with shortness of breath.  Patient had earlier presented to her primary care physician's office for shortness of breath and nonproductive cough which has been going on for past 4 to 5 days.  She was seen to be hypoxic in the office.  Placed on 2 L/min of oxygen and referred to [facility].  In the ER she was placed on 6 L/min of oxygen supplementation with which her oxygen levels quickly went above 90%.  She has been having dry nonproductive cough for last 4 to 5 days.  Does have some postnasal drip.  Denies any nausea, vomiting, abdominal pain, discolored expectoration, hemoptysis.  No recent travel or sick contacts.  Worked as an  and [employer] majority of her life.  Retired at the age of 67.  Lifelong non-smoker.  Did get exposed to secondhand smoking in the past through her .  Tries to stay active and goes to [employer] twice a week.   Past Medical History:  has a past medical history of Adhesive capsulitis of shoulder, Hypothyroidism, Osteoporosis, unspecified, Routine general medical examination at a health care facility, Seborrheic dermatitis, unspecified, and Unspecified disorder of skin and subcutaneous tissue.  Past Surgical History:  has a past surgical history that includes Cataract removal; Cataract removal; other surgical history; and Colonoscopy.  Discharge Recommendations: Olive Mckeon scored a 17/24 on the AM-PAC short mobility  assistance  Comments: No device used for transfers. Patient intermittently needed HHA from therapist to complete transfers   Ambulation:  Surface:level surface  Assistive Device: no device  Other Appliance: supplemental O2  Assistance: contact guard assistance  Distance: 10' (around bed to recliner chair), 5', 5' (to/from bathroom)  Gait Mechanics: Decreased gait speed, decreased step length/height  Comments: Patient reaching for external support   Stair Mobility:  Stair mobility not completed on this date.  Comments:  Wheelchair Mobility:  No w/c mobility completed on this date.  Comments:  Balance:  Static Sitting Balance: good: independent with functional balance in unsupported position  Dynamic Sitting Balance: good: independent with functional balance in unsupported position  Static Standing Balance: fair (-): maintains balance at SBA with use of UE support  Dynamic Standing Balance: fair (-): maintains balance at CGA with use of UE support  Comments:    Other Therapeutic Interventions  See OT note for assistance with ADLs     Functional Outcomes  AM-PAC Inpatient Mobility Raw Score : 17              Cognition  WFL  Orientation:    alert and oriented x 4  Command Following:   WFL    Education  Barriers To Learning: hearing  Patient Education: patient educated on goals, PT role and benefits, plan of care, general safety, functional mobility training, proper use of assistive device/equipment, energy conservation, transfer training, discharge recommendations  Learning Assessment:  patient verbalizes understanding, would benefit from continued reinforcement    Assessment  Activity Tolerance: Fair; patient initially on 1 L of O2. SpO2 88% in semi-martinez's position. Increased to 2 L. Patient desaturated to 83% after completing supine>sit transfer. Increased to 4 L, required extended time for patient's SpO2 to increase to 90%. SpO2 decreased to 80% after short distance gait training in the room. Increased to 6 L of

## 2024-02-14 NOTE — PROGRESS NOTES
PULMONARY AND CRITICAL CARE MEDICINE PROGRESS NOTE    Subjective: Patient sitting in bed in no apparent respiratory distress.  Reports that her breathing is much better.  Cough still present but less intense.  Currently on 3 L/min of oxygen supplementation saturating in mid 90s.    REVIEW OF SYSTEMS:   8 point review of systems negative except that mentioned in the subjective portion.    PAST MEDICAL HISTORY:   Past Medical History:   Diagnosis Date    Adhesive capsulitis of shoulder     Hypothyroidism     Osteoporosis, unspecified     Routine general medical examination at a health care facility     Seborrheic dermatitis, unspecified     Unspecified disorder of skin and subcutaneous tissue        PAST SURGICAL HISTORY:   Past Surgical History:   Procedure Laterality Date    CATARACT REMOVAL      left    CATARACT REMOVAL      right    COLONOSCOPY      OTHER SURGICAL HISTORY      anal fissure       SOCIAL HISTORY:   Social History     Tobacco Use    Smoking status: Never    Smokeless tobacco: Never   Substance Use Topics    Alcohol use: Yes     Comment: occasional    Drug use: No       FAMILY HISTORY:   Family History   Problem Relation Age of Onset    Hypertension Sister     Diabetes Sister     Stroke Father 68    High Blood Pressure Father     Heart Disease Mother 80    Cancer Paternal Aunt         breast       MEDICATIONS:     No current facility-administered medications on file prior to encounter.     Current Outpatient Medications on File Prior to Encounter   Medication Sig Dispense Refill    donepezil (ARICEPT) 10 MG tablet Take 2 tablets by mouth daily 180 tablet 1    levothyroxine (SYNTHROID) 100 MCG tablet Take 1 tablet by mouth daily 90 tablet 1    estradiol (ESTRACE) 0.1 MG/GM vaginal cream daily as needed      aspirin 81 MG tablet Take 1 tablet by mouth daily      docusate sodium (COLACE) 100 MG capsule Take 1 capsule by mouth daily      Ibuprofen-diphenhydrAMINE HCl 200-25 MG CAPS Take 1 tablet by

## 2024-02-14 NOTE — PLAN OF CARE
Problem: Safety - Adult  Goal: Free from fall injury  2/14/2024 1142 by Chava Kendrick RN  Outcome: Progressing  2/13/2024 2221 by Melissa Odom RN  Outcome: Progressing     Problem: ABCDS Injury Assessment  Goal: Absence of physical injury  2/14/2024 1142 by Chava Kendrick RN  Outcome: Progressing  2/13/2024 2221 by Melissa Odom RN  Outcome: Progressing

## 2024-02-14 NOTE — PLAN OF CARE
Problem: Safety - Adult  Goal: Free from fall injury  2/13/2024 2221 by Melissa Odom RN  Outcome: Progressing  2/13/2024 0837 by Sandi Sen RN  Outcome: Progressing     Problem: ABCDS Injury Assessment  Goal: Absence of physical injury  2/13/2024 2221 by Melissa Odom RN  Outcome: Progressing  2/13/2024 0837 by Sandi Sen RN  Outcome: Progressing

## 2024-02-14 NOTE — PROGRESS NOTES
HOSPITALISTS PROGRESS NOTE    2/14/2024 9:29 AM        Name: Olive Mckeon .              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    Interval history:   Pt seen and examined today.  Overnight events noted, interval ancillary notes and labs reviewed.   On 3 L NC satting around 93%  Afebrile overnight, WBCs WNL, cultures NGTD.  Respiratory panel pending  Reported that breathing status is improving; but still gets short of breath on exertion  Denies any fever, chills, chest pain, palpitation, nausea, vomiting or abdominal pain  Continue IV steroids today; plan to switch to prednisone tomorrow      Assessment & Plan:     Acute hypoxic respiratory failure/possible acute bronchitis.   Hypoxic on admission with sats around 76% on RA.  Afebrile with normal white count and procalcitonin  COVID 19 & influenza combo PCR negative.  MRSA nares negative.  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  Stable     Hypothyroidism; continue Synthroid     Mild cognitive impairment; continue home dose of Aricept        DVT PPX: Lovenox  Code:Full Code    Disposition: Once acute medical issues have resolved    Current Medications  aspirin chewable

## 2024-02-14 NOTE — DISCHARGE INSTR - COC
Continuity of Care Form    Patient Name: Olive Mckeon   :  1935  MRN:  [id number]    Admit date:  2024  Discharge date:  ***    Code Status Order: Full Code   Advance Directives:     Admitting Physician:  Donny Christopher DO  PCP: Blane Hale MD    Discharging Nurse: ***  Discharging Hospital Unit/Room#: 5TN-5576/5576-01  Discharging Unit Phone Number: ***    Emergency Contact:   Extended Emergency Contact Information  Primary Emergency Contact: Elva Vickers  Home Phone: [phone]  Relation: Child    Past Surgical History:  Past Surgical History:   Procedure Laterality Date    CATARACT REMOVAL      left    CATARACT REMOVAL      right    COLONOSCOPY      OTHER SURGICAL HISTORY      anal fissure       Immunization History:   Immunization History   Administered Date(s) Administered    COVID-19, MODERNA BLUE border, Primary or Immunocompromised, (age 12y+), IM, 100 mcg/0.5mL 2021, 2021    COVID-19, PFIZER Bivalent, DO NOT Dilute, (age 12y+), IM, 30 mcg/0.3 mL 10/18/2022    COVID-19, PFIZER PURPLE top, DILUTE for use, (age 12 y+), 30mcg/0.3mL 2021    Influenza Vaccine, unspecified formulation 10/10/2016    Influenza Virus Vaccine 10/24/2007, 10/03/2014, 10/10/2016    Influenza Whole 10/24/2007, 10/03/2014    Influenza, AFLURIA (age 3 yrs+), FLUZONE, (age 6 mo+), MDV, 0.5mL 2015    Influenza, FLUAD, (age 65 y+), Adjuvanted, 0.5mL 10/21/2020, 10/18/2022    Influenza, FLUZONE (age 65 y+), High Dose, 0.7mL 10/14/2021, 2023    Influenza, High Dose (Fluzone 65 yrs and older) 2015, 2017, 2018, 10/16/2018, 2019    PPD Test 10/24/2007    Pneumococcal, PCV-13, PREVNAR 13, (age 6w+), IM, 0.5mL 2016    Pneumococcal, PPSV23, PNEUMOVAX 23, (age 2y+), SC/IM, 0.5mL 10/08/2009    TDaP, ADACEL (age 10y-64y), BOOSTRIX (age 10y+), IM, 0.5mL 2016, 09/15/2020, 2022       Active Problems:  Patient Active Problem List   Diagnosis Code

## 2024-02-15 PROBLEM — J20.9 ACUTE BRONCHITIS: Status: ACTIVE | Noted: 2024-02-15

## 2024-02-15 PROBLEM — J84.10 PULMONARY FIBROSIS (HCC): Status: ACTIVE | Noted: 2024-02-15

## 2024-02-15 PROBLEM — Z86.16 HISTORY OF COVID-19: Status: ACTIVE | Noted: 2024-02-15

## 2024-02-15 LAB
ANION GAP SERPL CALCULATED.3IONS-SCNC: 9 MMOL/L (ref 3–16)
BASOPHILS # BLD: 0 K/UL (ref 0–0.2)
BASOPHILS NFR BLD: 0 %
BUN SERPL-MCNC: 19 MG/DL (ref 7–20)
CALCIUM SERPL-MCNC: 9.2 MG/DL (ref 8.3–10.6)
CHLORIDE SERPL-SCNC: 101 MMOL/L (ref 99–110)
CO2 SERPL-SCNC: 26 MMOL/L (ref 21–32)
CREAT SERPL-MCNC: 0.6 MG/DL (ref 0.6–1.2)
DEPRECATED RDW RBC AUTO: 14.4 % (ref 12.4–15.4)
EOSINOPHIL # BLD: 0 K/UL (ref 0–0.6)
EOSINOPHIL NFR BLD: 0 %
GFR SERPLBLD CREATININE-BSD FMLA CKD-EPI: >60 ML/MIN/{1.73_M2}
GLUCOSE SERPL-MCNC: 143 MG/DL (ref 70–99)
HCT VFR BLD AUTO: 37.8 % (ref 36–48)
HGB BLD-MCNC: 12.6 G/DL (ref 12–16)
LYMPHOCYTES # BLD: 0.8 K/UL (ref 1–5.1)
LYMPHOCYTES NFR BLD: 8.7 %
MCH RBC QN AUTO: 29.4 PG (ref 26–34)
MCHC RBC AUTO-ENTMCNC: 33.3 G/DL (ref 31–36)
MCV RBC AUTO: 88.2 FL (ref 80–100)
MONOCYTES # BLD: 0.6 K/UL (ref 0–1.3)
MONOCYTES NFR BLD: 5.7 %
NEUTROPHILS # BLD: 8.3 K/UL (ref 1.7–7.7)
NEUTROPHILS NFR BLD: 85.6 %
PLATELET # BLD AUTO: 272 K/UL (ref 135–450)
PMV BLD AUTO: 7.8 FL (ref 5–10.5)
POTASSIUM SERPL-SCNC: 4.8 MMOL/L (ref 3.5–5.1)
RBC # BLD AUTO: 4.28 M/UL (ref 4–5.2)
REPORT: NORMAL
RESP PATH DNA+RNA PNL NPH NAA+NON-PROBE: NORMAL
SODIUM SERPL-SCNC: 136 MMOL/L (ref 136–145)
WBC # BLD AUTO: 9.7 K/UL (ref 4–11)

## 2024-02-15 PROCEDURE — 6370000000 HC RX 637 (ALT 250 FOR IP): Performed by: PHYSICIAN ASSISTANT

## 2024-02-15 PROCEDURE — 94680 O2 UPTK RST&XERS DIR SIMPLE: CPT

## 2024-02-15 PROCEDURE — 2580000003 HC RX 258: Performed by: INTERNAL MEDICINE

## 2024-02-15 PROCEDURE — 36415 COLL VENOUS BLD VENIPUNCTURE: CPT

## 2024-02-15 PROCEDURE — 6370000000 HC RX 637 (ALT 250 FOR IP): Performed by: INTERNAL MEDICINE

## 2024-02-15 PROCEDURE — 85025 COMPLETE CBC W/AUTO DIFF WBC: CPT

## 2024-02-15 PROCEDURE — 94761 N-INVAS EAR/PLS OXIMETRY MLT: CPT

## 2024-02-15 PROCEDURE — 6360000002 HC RX W HCPCS: Performed by: PHYSICIAN ASSISTANT

## 2024-02-15 PROCEDURE — 1200000000 HC SEMI PRIVATE

## 2024-02-15 PROCEDURE — 2580000003 HC RX 258: Performed by: PHYSICIAN ASSISTANT

## 2024-02-15 PROCEDURE — 2700000000 HC OXYGEN THERAPY PER DAY

## 2024-02-15 PROCEDURE — 80048 BASIC METABOLIC PNL TOTAL CA: CPT

## 2024-02-15 PROCEDURE — 99233 SBSQ HOSP IP/OBS HIGH 50: CPT | Performed by: INTERNAL MEDICINE

## 2024-02-15 RX ORDER — SODIUM CHLORIDE 0.9 % (FLUSH) 0.9 %
5-40 SYRINGE (ML) INJECTION EVERY 12 HOURS SCHEDULED
Status: DISCONTINUED | OUTPATIENT
Start: 2024-02-15 | End: 2024-02-17

## 2024-02-15 RX ORDER — SODIUM CHLORIDE 0.9 % (FLUSH) 0.9 %
5-40 SYRINGE (ML) INJECTION PRN
Status: DISCONTINUED | OUTPATIENT
Start: 2024-02-15 | End: 2024-02-15

## 2024-02-15 RX ORDER — SODIUM CHLORIDE 9 MG/ML
25 INJECTION, SOLUTION INTRAVENOUS PRN
Status: DISCONTINUED | OUTPATIENT
Start: 2024-02-15 | End: 2024-02-17 | Stop reason: HOSPADM

## 2024-02-15 RX ORDER — LIDOCAINE HYDROCHLORIDE 10 MG/ML
5 INJECTION, SOLUTION EPIDURAL; INFILTRATION; INTRACAUDAL; PERINEURAL ONCE
Status: DISCONTINUED | OUTPATIENT
Start: 2024-02-15 | End: 2024-02-15

## 2024-02-15 RX ADMIN — ASPIRIN 81 MG 81 MG: 81 TABLET ORAL at 08:49

## 2024-02-15 RX ADMIN — SODIUM CHLORIDE, PRESERVATIVE FREE 10 ML: 5 INJECTION INTRAVENOUS at 08:50

## 2024-02-15 RX ADMIN — ENOXAPARIN SODIUM 40 MG: 100 INJECTION SUBCUTANEOUS at 08:49

## 2024-02-15 RX ADMIN — PREDNISONE 40 MG: 20 TABLET ORAL at 08:49

## 2024-02-15 RX ADMIN — DONEPEZIL HYDROCHLORIDE 20 MG: 5 TABLET, FILM COATED ORAL at 08:49

## 2024-02-15 RX ADMIN — LEVOTHYROXINE SODIUM 100 MCG: 0.1 TABLET ORAL at 05:17

## 2024-02-15 RX ADMIN — DOCUSATE SODIUM 100 MG: 100 CAPSULE, LIQUID FILLED ORAL at 08:49

## 2024-02-15 RX ADMIN — FAMOTIDINE 20 MG: 20 TABLET ORAL at 08:49

## 2024-02-15 RX ADMIN — SODIUM CHLORIDE, PRESERVATIVE FREE 10 ML: 5 INJECTION INTRAVENOUS at 20:46

## 2024-02-15 RX ADMIN — Medication 10 ML: at 20:46

## 2024-02-15 RX ADMIN — FLUTICASONE PROPIONATE 1 SPRAY: 50 SPRAY, METERED NASAL at 20:46

## 2024-02-15 NOTE — PROGRESS NOTES
Assessment complete. VSS. Patient resting in bed. Respirations even and easy. Call light in reach. Fall precautions in place. No needs expressed at this time. Will continue to monitor.

## 2024-02-15 NOTE — PROGRESS NOTES
02/15/24 1202   Resting (Room Air)   SpO2 92   HR 52   During Walk (Room Air)   SpO2 86   HR 93   During Walk (On O2)   SpO2 83      O2 Device Nasal cannula   O2 Flow Rate (l/min) 1 l/min   Need Additional O2 Flow Rate Rows Yes   O2 Flow Rate (l/min) 2 l/min   O2 Saturation 81%    O2 Flow Rate (l/min) 3 l/min   O2 Saturation 83%, HR 97   O2 Flow Rate (l/min) 4 l/min   O2 Saturation 87%, HR 94   O2 Flow Rate (l/min) 5 l/min   O2 Saturation 88%, HR 89   Walk/Assistance Device Ambulation   Rate of Dyspnea 2   Symptoms Shortness of breath;Fatigue   After Walk   SpO2 94   HR 76   O2 Device Nasal cannula   O2 Flow Rate (l/min) 4 l/min   Rate of Dyspnea 1   Comments Test terminated per MD

## 2024-02-15 NOTE — PROGRESS NOTES
PULMONARY AND CRITICAL CARE MEDICINE PROGRESS NOTE    Subjective: Patient sitting in chair in no apparent respiratory distress.  Reports feeling better.  While sitting she is on 3 L/min of oxygen supplementation saturating in high 90s.  No discolored expectoration.  No chest pain or chest tightness remains afebrile without leukocytosis.     REVIEW OF SYSTEMS:   8 point review of systems negative except that mentioned in the subjective portion.    PAST MEDICAL HISTORY:   Past Medical History:   Diagnosis Date    Adhesive capsulitis of shoulder     Hypothyroidism     Osteoporosis, unspecified     Routine general medical examination at a health care facility     Seborrheic dermatitis, unspecified     Unspecified disorder of skin and subcutaneous tissue        PAST SURGICAL HISTORY:   Past Surgical History:   Procedure Laterality Date    CATARACT REMOVAL      left    CATARACT REMOVAL      right    COLONOSCOPY      OTHER SURGICAL HISTORY      anal fissure       SOCIAL HISTORY:   Social History     Tobacco Use    Smoking status: Never    Smokeless tobacco: Never   Substance Use Topics    Alcohol use: Yes     Comment: occasional    Drug use: No       FAMILY HISTORY:   Family History   Problem Relation Age of Onset    Hypertension Sister     Diabetes Sister     Stroke Father 68    High Blood Pressure Father     Heart Disease Mother 80    Cancer Paternal Aunt         breast       MEDICATIONS:     No current facility-administered medications on file prior to encounter.     Current Outpatient Medications on File Prior to Encounter   Medication Sig Dispense Refill    donepezil (ARICEPT) 10 MG tablet Take 2 tablets by mouth daily 180 tablet 1    levothyroxine (SYNTHROID) 100 MCG tablet Take 1 tablet by mouth daily 90 tablet 1    estradiol (ESTRACE) 0.1 MG/GM vaginal cream daily as needed      aspirin 81 MG tablet Take 1 tablet by mouth daily      docusate sodium (COLACE) 100 MG capsule Take 1 capsule by mouth daily

## 2024-02-15 NOTE — PROGRESS NOTES
HOSPITALISTS PROGRESS NOTE    2/15/2024 9:22 AM        Name: Olive Mckeon .              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    Interval history:   Pt seen and examined today.  Overnight events noted, interval ancillary notes and labs reviewed.   Afebrile overnight, WBCs WNL, blood cultures NGTD, respiratory molecular panel and MRSA negative  Still requiring 4 -5 L on exertion.  Continue supplemental O2 and wean as tolerated keep sats b/w 90 to 92%  Plan for the patient to be discharged home tomorrow if medically stable      Assessment & Plan:     Acute hypoxic respiratory failure/possible acute bronchitis.   Hypoxic on admission with sats around 76% on RA.  Afebrile with normal white count and procalcitonin  COVID 19 & influenza combo PCR negative.  MRSA nares negative.  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  Stable     Hypothyroidism; continue Synthroid     Mild cognitive impairment; continue home dose of Aricept        DVT PPX: Lovenox  Code:Full Code    Disposition: Once acute medical issues have resolved    Current Medications  aspirin chewable tablet 81 mg, Daily  docusate sodium (COLACE) capsule 100 mg,

## 2024-02-16 LAB
ANION GAP SERPL CALCULATED.3IONS-SCNC: 5 MMOL/L (ref 3–16)
BACTERIA BLD CULT ORG #2: NORMAL
BACTERIA BLD CULT: NORMAL
BASOPHILS # BLD: 0 K/UL (ref 0–0.2)
BASOPHILS NFR BLD: 0.4 %
BUN SERPL-MCNC: 18 MG/DL (ref 7–20)
CALCIUM SERPL-MCNC: 9.1 MG/DL (ref 8.3–10.6)
CHLORIDE SERPL-SCNC: 101 MMOL/L (ref 99–110)
CO2 SERPL-SCNC: 29 MMOL/L (ref 21–32)
CREAT SERPL-MCNC: 0.8 MG/DL (ref 0.6–1.2)
DEPRECATED RDW RBC AUTO: 14.4 % (ref 12.4–15.4)
EOSINOPHIL # BLD: 0.1 K/UL (ref 0–0.6)
EOSINOPHIL NFR BLD: 1.2 %
GFR SERPLBLD CREATININE-BSD FMLA CKD-EPI: >60 ML/MIN/{1.73_M2}
GLUCOSE SERPL-MCNC: 97 MG/DL (ref 70–99)
HCT VFR BLD AUTO: 39.6 % (ref 36–48)
HGB BLD-MCNC: 13.1 G/DL (ref 12–16)
LYMPHOCYTES # BLD: 1.6 K/UL (ref 1–5.1)
LYMPHOCYTES NFR BLD: 21.9 %
MCH RBC QN AUTO: 29.4 PG (ref 26–34)
MCHC RBC AUTO-ENTMCNC: 33.1 G/DL (ref 31–36)
MCV RBC AUTO: 88.8 FL (ref 80–100)
MONOCYTES # BLD: 0.7 K/UL (ref 0–1.3)
MONOCYTES NFR BLD: 10 %
NEUTROPHILS # BLD: 4.9 K/UL (ref 1.7–7.7)
NEUTROPHILS NFR BLD: 66.5 %
PLATELET # BLD AUTO: 266 K/UL (ref 135–450)
PMV BLD AUTO: 7.6 FL (ref 5–10.5)
POTASSIUM SERPL-SCNC: 4.2 MMOL/L (ref 3.5–5.1)
RBC # BLD AUTO: 4.46 M/UL (ref 4–5.2)
SODIUM SERPL-SCNC: 135 MMOL/L (ref 136–145)
WBC # BLD AUTO: 7.4 K/UL (ref 4–11)

## 2024-02-16 PROCEDURE — 94680 O2 UPTK RST&XERS DIR SIMPLE: CPT

## 2024-02-16 PROCEDURE — 2580000003 HC RX 258: Performed by: INTERNAL MEDICINE

## 2024-02-16 PROCEDURE — 80048 BASIC METABOLIC PNL TOTAL CA: CPT

## 2024-02-16 PROCEDURE — 1200000000 HC SEMI PRIVATE

## 2024-02-16 PROCEDURE — 94761 N-INVAS EAR/PLS OXIMETRY MLT: CPT

## 2024-02-16 PROCEDURE — 6360000002 HC RX W HCPCS: Performed by: PHYSICIAN ASSISTANT

## 2024-02-16 PROCEDURE — 6370000000 HC RX 637 (ALT 250 FOR IP): Performed by: INTERNAL MEDICINE

## 2024-02-16 PROCEDURE — 2700000000 HC OXYGEN THERAPY PER DAY

## 2024-02-16 PROCEDURE — 85025 COMPLETE CBC W/AUTO DIFF WBC: CPT

## 2024-02-16 PROCEDURE — 6370000000 HC RX 637 (ALT 250 FOR IP): Performed by: PHYSICIAN ASSISTANT

## 2024-02-16 PROCEDURE — 36415 COLL VENOUS BLD VENIPUNCTURE: CPT

## 2024-02-16 PROCEDURE — 2580000003 HC RX 258: Performed by: PHYSICIAN ASSISTANT

## 2024-02-16 RX ORDER — PREDNISONE 20 MG/1
40 TABLET ORAL 2 TIMES DAILY
Status: DISCONTINUED | OUTPATIENT
Start: 2024-02-16 | End: 2024-02-17 | Stop reason: HOSPADM

## 2024-02-16 RX ADMIN — LEVOTHYROXINE SODIUM 100 MCG: 0.1 TABLET ORAL at 05:10

## 2024-02-16 RX ADMIN — DOCUSATE SODIUM 100 MG: 100 CAPSULE, LIQUID FILLED ORAL at 09:15

## 2024-02-16 RX ADMIN — ASPIRIN 81 MG 81 MG: 81 TABLET ORAL at 09:15

## 2024-02-16 RX ADMIN — PREDNISONE 40 MG: 20 TABLET ORAL at 16:05

## 2024-02-16 RX ADMIN — Medication 5 ML: at 06:17

## 2024-02-16 RX ADMIN — ENOXAPARIN SODIUM 40 MG: 100 INJECTION SUBCUTANEOUS at 09:15

## 2024-02-16 RX ADMIN — FLUTICASONE PROPIONATE 1 SPRAY: 50 SPRAY, METERED NASAL at 21:20

## 2024-02-16 RX ADMIN — SENNOSIDES 8.6 MG: 8.6 TABLET, COATED ORAL at 06:17

## 2024-02-16 RX ADMIN — Medication 10 ML: at 21:20

## 2024-02-16 RX ADMIN — DONEPEZIL HYDROCHLORIDE 20 MG: 5 TABLET, FILM COATED ORAL at 09:15

## 2024-02-16 RX ADMIN — FAMOTIDINE 20 MG: 20 TABLET ORAL at 09:15

## 2024-02-16 RX ADMIN — SODIUM CHLORIDE, PRESERVATIVE FREE 10 ML: 5 INJECTION INTRAVENOUS at 21:20

## 2024-02-16 NOTE — PROGRESS NOTES
13.1    272 266       BMP:    Recent Labs     02/14/24  0622 02/15/24  0544 02/16/24  0556    136 135*   K 4.6 4.8 4.2    101 101   CO2 25 26 29   BUN 16 19 18   CREATININE 0.6 0.6 0.8   GLUCOSE 153* 143* 97       Hepatic:   No results for input(s): \"AST\", \"ALT\", \"ALB\", \"BILITOT\", \"ALKPHOS\" in the last 72 hours.    CT CHEST PULMONARY EMBOLISM W CONTRAST   Final Result   1. No evidence of pulmonary embolus.   2. Multilobar ground-glass opacities and consolidations most pronounced in   the lower lobes bilaterally.  If patient has clinical symptoms of infection,   finding would be indicative of pneumonia.  Differential includes   atypical/viral pneumonia.   3. Chronic intra lobular septal thickening.  Differential includes   interstitial fibrosis.   4. Small hiatal hernia.   5. Cholelithiasis.  No evidence of cholecystitis.   6. Age indeterminate compression deformity of T4 results in 20% of height   loss.  No bony fragment retropulsion into the central canal.  This is new   since December 2020.   7. Chronic stable T5 compression deformity.         XR CHEST PORTABLE   Final Result   1. Worsening chronic interstitial lung disease; underlying pneumonia cannot   be excluded.             Problem List  Principal Problem:    Acute respiratory failure with hypoxia (HCC)  Active Problems:    Acute bronchitis    Pulmonary fibrosis (HCC)    History of COVID-19  Resolved Problems:    * No resolved hospital problems. *       ARELI WEEKS MD   2/16/2024 2:15 PM      Please note that some part of this chart was generated using Dragon dictation software. Although every effort was made to ensure the accuracy of this automated transcription, some errors in transcription may have occurred inadvertently. If you may need any clarification, please do not hesitate to contact me through EP[employer].

## 2024-02-16 NOTE — PROGRESS NOTES
02/16/24 0946   Resting (Room Air)   SpO2 92   HR 68   During Walk (Room Air)   SpO2 87   HR 93   Walk/Assistance Device Ambulation   Rate of Dyspnea 2   Symptoms Fatigue;Shortness of breath   During Walk (On O2)   SpO2 86      O2 Device Nasal cannula   O2 Flow Rate (l/min) 1 l/min   O2 Flow Rate (l/min) 2 l/min   O2 Saturation 85%, hr 103   O2 Flow Rate (l/min) 3 l/min   O2 Saturation 83%, hr 104   O2 Flow Rate (l/min) 4 l/min   O2 Saturation 82%, hr 105   O2 Flow Rate (l/min) 5 l/min   O2 Saturation 85%, hr 111   O2 Flow Rate (l/min) 6 l/min   O2 Saturation 84%, hr 109,  7l/min 84%, hr 102, 8l/min 88%, hr 111, 9l/min 92%, hr 93   Walk/Assistance Device Ambulation   Rate of Dyspnea 2   Symptoms Fatigue;Shortness of breath   After Walk   SpO2 95   HR 66   O2 Device Nasal cannula   O2 Flow Rate (l/min) 2 l/min   Rate of Dyspnea 0   Does the Patient Qualify for Home O2 Yes   Liter Flow at Rest 0   Liter Flow on Exertion 9   Does the Patient Need Portable Oxygen Tanks Yes

## 2024-02-16 NOTE — PROGRESS NOTES
02/16/24 0932   RT Protocol   History Pulmonary Disease 2   Respiratory pattern 0   Breath sounds 0   Cough 0   Bronchodilator Assessment Score 2

## 2024-02-17 VITALS
DIASTOLIC BLOOD PRESSURE: 74 MMHG | BODY MASS INDEX: 22.22 KG/M2 | WEIGHT: 146.61 LBS | HEIGHT: 68 IN | HEART RATE: 67 BPM | RESPIRATION RATE: 18 BRPM | SYSTOLIC BLOOD PRESSURE: 139 MMHG | OXYGEN SATURATION: 92 % | TEMPERATURE: 98.3 F

## 2024-02-17 LAB
ANION GAP SERPL CALCULATED.3IONS-SCNC: 10 MMOL/L (ref 3–16)
BASOPHILS # BLD: 0 K/UL (ref 0–0.2)
BASOPHILS NFR BLD: 0 %
BUN SERPL-MCNC: 15 MG/DL (ref 7–20)
CALCIUM SERPL-MCNC: 9.4 MG/DL (ref 8.3–10.6)
CHLORIDE SERPL-SCNC: 97 MMOL/L (ref 99–110)
CO2 SERPL-SCNC: 27 MMOL/L (ref 21–32)
CREAT SERPL-MCNC: 0.7 MG/DL (ref 0.6–1.2)
DEPRECATED RDW RBC AUTO: 14.4 % (ref 12.4–15.4)
EOSINOPHIL # BLD: 0 K/UL (ref 0–0.6)
EOSINOPHIL NFR BLD: 0 %
GFR SERPLBLD CREATININE-BSD FMLA CKD-EPI: >60 ML/MIN/{1.73_M2}
GLUCOSE SERPL-MCNC: 129 MG/DL (ref 70–99)
HCT VFR BLD AUTO: 45.2 % (ref 36–48)
HGB BLD-MCNC: 14.8 G/DL (ref 12–16)
LYMPHOCYTES # BLD: 0.6 K/UL (ref 1–5.1)
LYMPHOCYTES NFR BLD: 4 %
MCH RBC QN AUTO: 29.1 PG (ref 26–34)
MCHC RBC AUTO-ENTMCNC: 32.7 G/DL (ref 31–36)
MCV RBC AUTO: 89 FL (ref 80–100)
MONOCYTES # BLD: 0.4 K/UL (ref 0–1.3)
MONOCYTES NFR BLD: 4 %
MYELOCYTES NFR BLD MANUAL: 1 %
NEUTROPHILS # BLD: 9.6 K/UL (ref 1.7–7.7)
NEUTROPHILS NFR BLD: 89 %
PLATELET # BLD AUTO: 314 K/UL (ref 135–450)
PLATELET BLD QL SMEAR: ADEQUATE
PMV BLD AUTO: 8.1 FL (ref 5–10.5)
POTASSIUM SERPL-SCNC: 4.7 MMOL/L (ref 3.5–5.1)
RBC # BLD AUTO: 5.08 M/UL (ref 4–5.2)
RBC MORPH BLD: NORMAL
SLIDE REVIEW: ABNORMAL
SODIUM SERPL-SCNC: 134 MMOL/L (ref 136–145)
VARIANT LYMPHS NFR BLD MANUAL: 2 % (ref 0–6)
WBC # BLD AUTO: 10.7 K/UL (ref 4–11)

## 2024-02-17 PROCEDURE — 2700000000 HC OXYGEN THERAPY PER DAY

## 2024-02-17 PROCEDURE — 6370000000 HC RX 637 (ALT 250 FOR IP): Performed by: INTERNAL MEDICINE

## 2024-02-17 PROCEDURE — 36415 COLL VENOUS BLD VENIPUNCTURE: CPT

## 2024-02-17 PROCEDURE — 80048 BASIC METABOLIC PNL TOTAL CA: CPT

## 2024-02-17 PROCEDURE — 85025 COMPLETE CBC W/AUTO DIFF WBC: CPT

## 2024-02-17 PROCEDURE — 6370000000 HC RX 637 (ALT 250 FOR IP): Performed by: PHYSICIAN ASSISTANT

## 2024-02-17 PROCEDURE — 6360000002 HC RX W HCPCS: Performed by: PHYSICIAN ASSISTANT

## 2024-02-17 PROCEDURE — 94680 O2 UPTK RST&XERS DIR SIMPLE: CPT

## 2024-02-17 PROCEDURE — 2580000003 HC RX 258: Performed by: PHYSICIAN ASSISTANT

## 2024-02-17 PROCEDURE — 94761 N-INVAS EAR/PLS OXIMETRY MLT: CPT

## 2024-02-17 RX ORDER — FAMOTIDINE 20 MG/1
20 TABLET, FILM COATED ORAL DAILY
Qty: 14 TABLET | Refills: 0 | Status: SHIPPED | OUTPATIENT
Start: 2024-02-18 | End: 2024-03-03

## 2024-02-17 RX ORDER — PREDNISONE 20 MG/1
TABLET ORAL
Qty: 29 TABLET | Refills: 0 | Status: SHIPPED | OUTPATIENT
Start: 2024-02-18 | End: 2024-02-27

## 2024-02-17 RX ORDER — FLUTICASONE PROPIONATE 50 MCG
1 SPRAY, SUSPENSION (ML) NASAL 2 TIMES DAILY
Qty: 16 G | Refills: 1 | Status: SHIPPED | OUTPATIENT
Start: 2024-02-17

## 2024-02-17 RX ORDER — GUAIFENESIN/DEXTROMETHORPHAN 100-10MG/5
5 SYRUP ORAL 3 TIMES DAILY PRN
Qty: 75 ML | Refills: 0 | Status: SHIPPED | OUTPATIENT
Start: 2024-02-17 | End: 2024-02-22

## 2024-02-17 RX ADMIN — SODIUM CHLORIDE, PRESERVATIVE FREE 10 ML: 5 INJECTION INTRAVENOUS at 08:44

## 2024-02-17 RX ADMIN — PREDNISONE 40 MG: 20 TABLET ORAL at 08:38

## 2024-02-17 RX ADMIN — FAMOTIDINE 20 MG: 20 TABLET ORAL at 08:38

## 2024-02-17 RX ADMIN — ASPIRIN 81 MG 81 MG: 81 TABLET ORAL at 08:39

## 2024-02-17 RX ADMIN — ENOXAPARIN SODIUM 40 MG: 100 INJECTION SUBCUTANEOUS at 08:38

## 2024-02-17 RX ADMIN — DOCUSATE SODIUM 100 MG: 100 CAPSULE, LIQUID FILLED ORAL at 08:39

## 2024-02-17 RX ADMIN — FLUTICASONE PROPIONATE 1 SPRAY: 50 SPRAY, METERED NASAL at 08:38

## 2024-02-17 RX ADMIN — DONEPEZIL HYDROCHLORIDE 20 MG: 5 TABLET, FILM COATED ORAL at 08:38

## 2024-02-17 RX ADMIN — LEVOTHYROXINE SODIUM 100 MCG: 0.1 TABLET ORAL at 05:57

## 2024-02-17 ASSESSMENT — PAIN SCALES - GENERAL: PAINLEVEL_OUTOF10: 0

## 2024-02-17 NOTE — PROGRESS NOTES
Shift assessment completed. Routine vitals obtained. Meds given per MAR, Patient is awake, alert and oriented. Patient does not appear to be in distress, resting comfortably at this time. Call light within reach, bed in lowest position.

## 2024-02-17 NOTE — DISCHARGE SUMMARY
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   COVID 19 & influenza combo PCR negative.  MRSA nares negative.  Respiratory panel pending  CTPA negative for PE; showed ground-glass opacities and consolidations most pronounced in bilateral lower lobes as well as chronic intralobular septal thickening consistent with interstitial fibrosis  Pulmonology consulted; treated with steroid taper, breathing treatments.  O2 requirement was weaned as tolerated to  2 L NC at at rest and 6 L on exertion.  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  Stable     Hypothyroidism; continue Synthroid     Mild cognitive impairment; continue home dose of Aricept      Physical Exam Performed:     /74   Pulse 67   Temp 98.3 °F (36.8 °C) (Oral)   Resp 18   Ht 1.727 m (5' 8\")   Wt 66.5 kg (146 lb 9.7 oz)   SpO2 92%   BMI 22.29 kg/m²     General appearance: No apparent distress, appears stated age and cooperative.  Eyes: Sclera clear. Pupils equal.  ENT: Moist oral mucosa. Trachea midline, no adenopathy.  Cardiovascular: Regular rhythm, normal S1, S2. No murmur.   Respiratory: Clear to auscultation bilaterally, no wheeze or crackles.   GI: Abdomen soft, no tenderness, not distended, normal bowel sounds  Musculoskeletal:  No cyanosis in digits.  No BLE edema present  Neurology: CN 2-12 grossly intact. No speech or motor

## 2024-02-17 NOTE — PROGRESS NOTES
HOSPITALISTS PROGRESS NOTE    2/17/2024 5:47 PM        Name: Olive Mckeon .              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    Interval history:   Pt seen and examined today.  Overnight events noted, interval ancillary notes and labs reviewed.   Patient still requiring 2 L at rest and up to 6 L on exertion  denied any fever, chills, chest pain, nausea, vomiting or abdominal pain  Refusing SNF and stated that she will have her daughter later.   consulted for home O2 arrangement        Assessment & Plan:     Acute hypoxic respiratory failure/possible acute bronchitis.   Hypoxic on admission with sats around 76% on RA.  Afebrile with normal white count and procalcitonin  COVID 19 & influenza combo PCR negative.  MRSA nares negative.  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  Stable     Hypothyroidism; continue Synthroid     Mild cognitive impairment; continue home dose of Aricept        DVT PPX: Lovenox  Code:Full Code    Disposition: Once acute medical issues have resolved    Current Medications  predniSONE (DELTASONE) tablet 40 mg, BID  0.9 % sodium chloride infusion, PRN  aspirin chewable tablet 81 mg,

## 2024-02-17 NOTE — CARE COORDINATION
Case Management -  Discharge Note      Patient Name: Olive Mckeon                   YOB: 1935            Readmission Risk (Low < 19, Mod (19-27), High > 27): Readmission Risk Score: 7.4    Current PCP: Blane Hale MD    ([facility]) Important Message from Medicare:    Date: 2/17/2024 attempted to contact daughter but daughter, Elva, did not answer.     PT AM-PAC: 17 /24  OT AM-PAC: 20 /24    Home Care Information:   Is patient resuming current home health care services: No    Home Care Agency:   [address],   [place] [address]  Phone: [phone]  Fax: [phone]    Name:  [facility]  Phone:  [phone]  Fax:      [phone]                Services: Nursing, PT, OT  Home Health Order Obtained: Yes    Home health agency notified of discharge.       Financial    Payor: MEDIGOLD / Plan: MEDIGOLD / Product Type: *No Product type* /     Pharmacy:  Potential assistance Purchasing Medications:    Meds-to-Beds request:        [facility] [id number] - [place], [place] - [facility] [address] [phone] - F [phone]  [address]  [facility] [address]  Phone: [phone] Fax: [phone]      Notes:    Additional Case Management Notes: Pt is being sent home with 2 O2 tanks. Aerkikiellen is to reach out to pt's daughter, Elva, to confirm someone is at the house to get the O2 tanks delivered.    All CM needs have been met.         Electronically signed by ROSS Medellin on 2/17/2024 at 3:45 PM

## 2024-02-17 NOTE — PROGRESS NOTES
Shift assessment completed. Routine vitals obtained and stable. Scheduled medications given. Patient is awake, alert and oriented. Respirations are easy and unlabored. Patient does not appear to be in distress, resting comfortably in bed and eating breakfast at this time. Call light within reach.

## 2024-02-17 NOTE — DISCHARGE INSTRUCTIONS
Follow up with your PCP within 5-7 days of discharge.  Follow up with pulmonology in 7 to 10 days  Take all your medications as prescribed.

## 2024-02-17 NOTE — CARE COORDINATION
Discharge Planning:     (CM) referral sent to [facility].      CM to follow up.      Electronically signed by ROSS Medellin on 2/17/2024 at 2:56 PM        UPDATE @ 7144    CM spoke to Bridgette from [facility] and asked about timing on when O2 could be delivered to the patient's house. She stated they will be able to deliver the oxygen today.    CM sent a PerfectServe to Dr. Rosen[facility]           Electronically signed by ROSS Medellin on 2/17/2024 at 3:29 PM

## 2024-02-17 NOTE — CARE COORDINATION
02/17/24 1542   IMM Letter   IMM Letter given to Patient/Family/Significant other/Guardian/POA/by:  Jenifer attempted to contact the patient's daughter to get the IMM signed. The patient's daughter did not answer her phone.   IMM Letter date given: 02/17/24   IMM Letter time given: 1543           Electronically signed by ROSS Medellin on 2/17/2024 at 3:43 PM

## 2024-02-17 NOTE — PROGRESS NOTES
02/17/24 1101   Resting (Room Air)   SpO2 91   HR 65   During Walk (Room Air)   SpO2 82      Walk/Assistance Device Ambulation   Rate of Dyspnea 2   Symptoms Fatigue   During Walk (On O2)   SpO2 85   HR 96   O2 Device Nasal cannula   O2 Flow Rate (l/min) 1 l/min   O2 Flow Rate (l/min) 2 l/min   O2 Saturation 86%, hr 100   O2 Flow Rate (l/min) 3 l/min   O2 Saturation 88%, hr 101   O2 Flow Rate (l/min) 4 l/min   O2 Saturation 88%, hr98   O2 Flow Rate (l/min) 5 l/min   O2 Saturation 90%, 98   O2 Flow Rate (l/min) 6 l/min   O2 Saturation 91 %, hr 99   Walk/Assistance Device Ambulation   Rate of Dyspnea 2   Symptoms Fatigue;Dizziness   After Walk   SpO2 98   HR 60   O2 Device Nasal cannula   O2 Flow Rate (l/min) 2 l/min   Rate of Dyspnea 1   Does the Patient Qualify for Home O2 Yes   Liter Flow at Rest 0   Liter Flow on Exertion 6   Does the Patient Need Portable Oxygen Tanks Yes

## 2024-02-18 NOTE — PROGRESS NOTES
Patient discharging home today with daughter, whom is here to transport patient. IV access removed without complication and dry dressing applied. Patient tolerated well. Discharge instructions reviewed with and received by daughter whom verbalized understanding.

## 2024-02-19 ENCOUNTER — TELEPHONE (OUTPATIENT)
Dept: FAMILY MEDICINE CLINIC | Age: 89
End: 2024-02-19

## 2024-02-19 NOTE — TELEPHONE ENCOUNTER
Care Transitions Initial Follow Up Call    Outreach made within 2 business days of discharge: Yes    Patient: Olive Mckeon Patient : 1935   MRN: [id number]  Reason for Admission: ARF  Discharge Date: 24       Spoke with: Pt    Discharge department/facility: [facility] Interactive Patient Contact:  Was patient able to fill all prescriptions: Yes  Was patient instructed to bring all medications to the follow-up visit: Yes  Is patient taking all medications as directed in the discharge summary? Yes  Does patient understand their discharge instructions: Yes  Does patient have questions or concerns that need addressed prior to 7-14 day follow up office visit: no    Scheduled appointment with PCP within 7-14 days    Follow Up  Future Appointments   Date Time Provider Department Center   2024  3:00 PM Blane Hale MD [facility] LPN

## 2024-02-20 ENCOUNTER — TELEPHONE (OUTPATIENT)
Dept: PULMONOLOGY | Age: 89
End: 2024-02-20

## 2024-02-20 NOTE — TELEPHONE ENCOUNTER
----- Message from Randy Fuentes MD sent at 2/15/2024  2:32 PM EST -----  This patient will need a follow up with me in 7 -10 days from now. Thanks.     AG

## 2024-02-26 ENCOUNTER — OFFICE VISIT (OUTPATIENT)
Dept: FAMILY MEDICINE CLINIC | Age: 89
End: 2024-02-26
Payer: COMMERCIAL

## 2024-02-26 VITALS
OXYGEN SATURATION: 90 % | HEART RATE: 100 BPM | TEMPERATURE: 97.4 F | DIASTOLIC BLOOD PRESSURE: 66 MMHG | SYSTOLIC BLOOD PRESSURE: 100 MMHG

## 2024-02-26 DIAGNOSIS — Z09 HOSPITAL DISCHARGE FOLLOW-UP: ICD-10-CM

## 2024-02-26 DIAGNOSIS — J18.9 MULTIFOCAL PNEUMONIA: Primary | ICD-10-CM

## 2024-02-26 DIAGNOSIS — J96.01 ACUTE RESPIRATORY FAILURE WITH HYPOXIA (HCC): ICD-10-CM

## 2024-02-26 DIAGNOSIS — J84.10 PULMONARY INTERSTITIAL FIBROSIS (HCC): ICD-10-CM

## 2024-02-26 PROCEDURE — 1123F ACP DISCUSS/DSCN MKR DOCD: CPT | Performed by: NURSE PRACTITIONER

## 2024-02-26 PROCEDURE — 99214 OFFICE O/P EST MOD 30 MIN: CPT | Performed by: NURSE PRACTITIONER

## 2024-02-26 ASSESSMENT — PATIENT HEALTH QUESTIONNAIRE - PHQ9
SUM OF ALL RESPONSES TO PHQ QUESTIONS 1-9: 0
1. LITTLE INTEREST OR PLEASURE IN DOING THINGS: 0
SUM OF ALL RESPONSES TO PHQ QUESTIONS 1-9: 0
SUM OF ALL RESPONSES TO PHQ QUESTIONS 1-9: 0
SUM OF ALL RESPONSES TO PHQ9 QUESTIONS 1 & 2: 0
2. FEELING DOWN, DEPRESSED OR HOPELESS: 0
SUM OF ALL RESPONSES TO PHQ QUESTIONS 1-9: 0

## 2024-02-26 NOTE — PROGRESS NOTES
breathing treatments.  Pt discharged home with oxygen, currently on 3 L NC.  Pt is needing follow up with pulmonology.  Reports symptoms have improved but continues with drop in O2 sats with exertion, reports monitoring with pulse ox.  Pt is interested in portable oxygen, has completed course of home prednisone.       Current Outpatient Medications on File Prior to Visit   Medication Sig Dispense Refill    fluticasone (FLONASE) 50 MCG/ACT nasal spray 1 spray by Each Nostril route in the morning and at bedtime 16 g 1    sodium chloride (OCEAN, BABY AYR) 0.65 % nasal spray 1 spray by Nasal route as needed for Congestion 1 each 1    donepezil (ARICEPT) 10 MG tablet Take 2 tablets by mouth daily 180 tablet 1    levothyroxine (SYNTHROID) 100 MCG tablet Take 1 tablet by mouth daily 90 tablet 1    estradiol (ESTRACE) 0.1 MG/GM vaginal cream daily as needed      aspirin 81 MG tablet Take 1 tablet by mouth daily      docusate sodium (COLACE) 100 MG capsule Take 1 capsule by mouth daily      Ibuprofen-diphenhydrAMINE HCl 200-25 MG CAPS Take 1 tablet by mouth nightly        No current facility-administered medications on file prior to visit.      No Known Allergies  Past Medical History:   Diagnosis Date    Adhesive capsulitis of shoulder     Hypothyroidism     Osteoporosis, unspecified     Routine general medical examination at a health care facility     Seborrheic dermatitis, unspecified     Unspecified disorder of skin and subcutaneous tissue       Past Surgical History:   Procedure Laterality Date    CATARACT REMOVAL      left    CATARACT REMOVAL      right    COLONOSCOPY      OTHER SURGICAL HISTORY      anal fissure      Family History   Problem Relation Age of Onset    Hypertension Sister     Diabetes Sister     Stroke Father 68    High Blood Pressure Father     Heart Disease Mother 80    Cancer Paternal Aunt         breast      Social History     Tobacco Use    Smoking status: Never    Smokeless tobacco: Never

## 2024-03-05 ENCOUNTER — OFFICE VISIT (OUTPATIENT)
Dept: FAMILY MEDICINE CLINIC | Age: 89
End: 2024-03-05
Payer: COMMERCIAL

## 2024-03-05 ENCOUNTER — APPOINTMENT (OUTPATIENT)
Dept: CT IMAGING | Age: 89
DRG: 189 | End: 2024-03-05
Payer: COMMERCIAL

## 2024-03-05 ENCOUNTER — APPOINTMENT (OUTPATIENT)
Dept: GENERAL RADIOLOGY | Age: 89
DRG: 189 | End: 2024-03-05
Payer: COMMERCIAL

## 2024-03-05 ENCOUNTER — HOSPITAL ENCOUNTER (INPATIENT)
Age: 89
LOS: 15 days | Discharge: HOSPICE | DRG: 189 | End: 2024-03-20
Attending: EMERGENCY MEDICINE | Admitting: INTERNAL MEDICINE
Payer: COMMERCIAL

## 2024-03-05 VITALS — TEMPERATURE: 97.3 F | OXYGEN SATURATION: 72 %

## 2024-03-05 DIAGNOSIS — R06.03 RESPIRATORY DISTRESS: Primary | ICD-10-CM

## 2024-03-05 DIAGNOSIS — J96.01 ACUTE RESPIRATORY FAILURE WITH HYPOXIA (HCC): ICD-10-CM

## 2024-03-05 DIAGNOSIS — I48.0 PAF (PAROXYSMAL ATRIAL FIBRILLATION) (HCC): ICD-10-CM

## 2024-03-05 DIAGNOSIS — R68.89 INCREASED OXYGEN DEMAND: Primary | ICD-10-CM

## 2024-03-05 PROBLEM — J96.00 ACUTE RESPIRATORY FAILURE (HCC): Status: ACTIVE | Noted: 2024-03-05

## 2024-03-05 LAB
ALBUMIN SERPL-MCNC: 3 G/DL (ref 3.4–5)
ALBUMIN/GLOB SERPL: 1 {RATIO} (ref 1.1–2.2)
ALP SERPL-CCNC: 71 U/L (ref 40–129)
ALT SERPL-CCNC: 8 U/L (ref 10–40)
ANION GAP SERPL CALCULATED.3IONS-SCNC: 10 MMOL/L (ref 3–16)
AST SERPL-CCNC: 19 U/L (ref 15–37)
BASE EXCESS BLDV CALC-SCNC: 0.9 MMOL/L (ref -3–3)
BASOPHILS # BLD: 0.1 K/UL (ref 0–0.2)
BASOPHILS NFR BLD: 0.7 %
BILIRUB SERPL-MCNC: 0.7 MG/DL (ref 0–1)
BUN SERPL-MCNC: 10 MG/DL (ref 7–20)
CALCIUM SERPL-MCNC: 8.7 MG/DL (ref 8.3–10.6)
CHLORIDE SERPL-SCNC: 99 MMOL/L (ref 99–110)
CO2 BLDV-SCNC: 63 MMOL/L
CO2 SERPL-SCNC: 24 MMOL/L (ref 21–32)
COHGB MFR BLDV: 3.3 % (ref 0–1.5)
CREAT SERPL-MCNC: 0.6 MG/DL (ref 0.6–1.2)
DEPRECATED RDW RBC AUTO: 14.6 % (ref 12.4–15.4)
DO-HGB MFR BLDV: 3 %
EOSINOPHIL # BLD: 0.2 K/UL (ref 0–0.6)
EOSINOPHIL NFR BLD: 2.6 %
FLUAV RNA RESP QL NAA+PROBE: NOT DETECTED
FLUBV RNA RESP QL NAA+PROBE: NOT DETECTED
GFR SERPLBLD CREATININE-BSD FMLA CKD-EPI: >60 ML/MIN/{1.73_M2}
GLUCOSE SERPL-MCNC: 123 MG/DL (ref 70–99)
HCO3 BLDV-SCNC: 26.7 MMOL/L (ref 23–29)
HCT VFR BLD AUTO: 38.2 % (ref 36–48)
HGB BLD-MCNC: 12.8 G/DL (ref 12–16)
LACTATE BLDV-SCNC: 0.8 MMOL/L (ref 0.4–1.9)
LACTATE BLDV-SCNC: 1.5 MMOL/L (ref 0.4–1.9)
LYMPHOCYTES # BLD: 0.6 K/UL (ref 1–5.1)
LYMPHOCYTES NFR BLD: 7.3 %
MCH RBC QN AUTO: 29.4 PG (ref 26–34)
MCHC RBC AUTO-ENTMCNC: 33.5 G/DL (ref 31–36)
MCV RBC AUTO: 87.7 FL (ref 80–100)
METHGB MFR BLDV: 0.3 %
MONOCYTES # BLD: 0.6 K/UL (ref 0–1.3)
MONOCYTES NFR BLD: 7.2 %
NEUTROPHILS # BLD: 6.5 K/UL (ref 1.7–7.7)
NEUTROPHILS NFR BLD: 82.2 %
NT-PROBNP SERPL-MCNC: 1408 PG/ML (ref 0–449)
O2 CT VFR BLDV CALC: 17 VOL %
O2 THERAPY: ABNORMAL
PCO2 BLDV: 46.1 MMHG (ref 40–50)
PH BLDV: 7.37 [PH] (ref 7.35–7.45)
PLATELET # BLD AUTO: 170 K/UL (ref 135–450)
PMV BLD AUTO: 8.2 FL (ref 5–10.5)
PO2 BLDV: 82.6 MMHG (ref 25–40)
POTASSIUM SERPL-SCNC: 4.6 MMOL/L (ref 3.5–5.1)
PROCALCITONIN SERPL IA-MCNC: 0.11 NG/ML (ref 0–0.15)
PROT SERPL-MCNC: 5.9 G/DL (ref 6.4–8.2)
RBC # BLD AUTO: 4.35 M/UL (ref 4–5.2)
RSV AG NOSE QL: NEGATIVE
SAO2 % BLDV: 97 %
SARS-COV-2 RNA RESP QL NAA+PROBE: NOT DETECTED
SODIUM SERPL-SCNC: 133 MMOL/L (ref 136–145)
TROPONIN, HIGH SENSITIVITY: 17 NG/L (ref 0–14)
TROPONIN, HIGH SENSITIVITY: 17 NG/L (ref 0–14)
WBC # BLD AUTO: 7.9 K/UL (ref 4–11)

## 2024-03-05 PROCEDURE — 2100000000 HC CCU R&B

## 2024-03-05 PROCEDURE — 99213 OFFICE O/P EST LOW 20 MIN: CPT | Performed by: NURSE PRACTITIONER

## 2024-03-05 PROCEDURE — 96374 THER/PROPH/DIAG INJ IV PUSH: CPT

## 2024-03-05 PROCEDURE — 99285 EMERGENCY DEPT VISIT HI MDM: CPT

## 2024-03-05 PROCEDURE — 6360000004 HC RX CONTRAST MEDICATION: Performed by: INTERNAL MEDICINE

## 2024-03-05 PROCEDURE — 84145 PROCALCITONIN (PCT): CPT

## 2024-03-05 PROCEDURE — 82803 BLOOD GASES ANY COMBINATION: CPT

## 2024-03-05 PROCEDURE — 36415 COLL VENOUS BLD VENIPUNCTURE: CPT

## 2024-03-05 PROCEDURE — 87807 RSV ASSAY W/OPTIC: CPT

## 2024-03-05 PROCEDURE — 84484 ASSAY OF TROPONIN QUANT: CPT

## 2024-03-05 PROCEDURE — 83880 ASSAY OF NATRIURETIC PEPTIDE: CPT

## 2024-03-05 PROCEDURE — 83605 ASSAY OF LACTIC ACID: CPT

## 2024-03-05 PROCEDURE — 71260 CT THORAX DX C+: CPT

## 2024-03-05 PROCEDURE — 87636 SARSCOV2 & INF A&B AMP PRB: CPT

## 2024-03-05 PROCEDURE — 85025 COMPLETE CBC W/AUTO DIFF WBC: CPT

## 2024-03-05 PROCEDURE — 80053 COMPREHEN METABOLIC PANEL: CPT

## 2024-03-05 PROCEDURE — 71045 X-RAY EXAM CHEST 1 VIEW: CPT

## 2024-03-05 PROCEDURE — 1123F ACP DISCUSS/DSCN MKR DOCD: CPT | Performed by: NURSE PRACTITIONER

## 2024-03-05 PROCEDURE — 6360000002 HC RX W HCPCS: Performed by: NURSE PRACTITIONER

## 2024-03-05 PROCEDURE — 93005 ELECTROCARDIOGRAM TRACING: CPT | Performed by: EMERGENCY MEDICINE

## 2024-03-05 RX ORDER — SODIUM CHLORIDE 9 MG/ML
INJECTION, SOLUTION INTRAVENOUS PRN
Status: DISCONTINUED | OUTPATIENT
Start: 2024-03-05 | End: 2024-03-20 | Stop reason: HOSPADM

## 2024-03-05 RX ORDER — POTASSIUM CHLORIDE 7.45 MG/ML
10 INJECTION INTRAVENOUS PRN
Status: DISCONTINUED | OUTPATIENT
Start: 2024-03-05 | End: 2024-03-20 | Stop reason: HOSPADM

## 2024-03-05 RX ORDER — SODIUM CHLORIDE 0.9 % (FLUSH) 0.9 %
5-40 SYRINGE (ML) INJECTION PRN
Status: DISCONTINUED | OUTPATIENT
Start: 2024-03-05 | End: 2024-03-20 | Stop reason: HOSPADM

## 2024-03-05 RX ORDER — FUROSEMIDE 10 MG/ML
20 INJECTION INTRAMUSCULAR; INTRAVENOUS ONCE
Status: COMPLETED | OUTPATIENT
Start: 2024-03-05 | End: 2024-03-05

## 2024-03-05 RX ORDER — VITAMIN B COMPLEX
1000 TABLET ORAL DAILY
Status: DISCONTINUED | OUTPATIENT
Start: 2024-03-06 | End: 2024-03-20 | Stop reason: HOSPADM

## 2024-03-05 RX ORDER — DONEPEZIL HYDROCHLORIDE 5 MG/1
20 TABLET, FILM COATED ORAL NIGHTLY
Status: DISCONTINUED | OUTPATIENT
Start: 2024-03-06 | End: 2024-03-20 | Stop reason: HOSPADM

## 2024-03-05 RX ORDER — ONDANSETRON 2 MG/ML
4 INJECTION INTRAMUSCULAR; INTRAVENOUS EVERY 6 HOURS PRN
Status: DISCONTINUED | OUTPATIENT
Start: 2024-03-05 | End: 2024-03-20 | Stop reason: HOSPADM

## 2024-03-05 RX ORDER — FUROSEMIDE 10 MG/ML
20 INJECTION INTRAMUSCULAR; INTRAVENOUS DAILY
Status: DISCONTINUED | OUTPATIENT
Start: 2024-03-06 | End: 2024-03-06

## 2024-03-05 RX ORDER — LEVOTHYROXINE SODIUM 0.1 MG/1
100 TABLET ORAL DAILY
Status: DISCONTINUED | OUTPATIENT
Start: 2024-03-06 | End: 2024-03-20 | Stop reason: HOSPADM

## 2024-03-05 RX ORDER — DOCUSATE SODIUM 100 MG/1
100 CAPSULE, LIQUID FILLED ORAL 2 TIMES DAILY PRN
Status: DISCONTINUED | OUTPATIENT
Start: 2024-03-05 | End: 2024-03-20 | Stop reason: HOSPADM

## 2024-03-05 RX ORDER — ASPIRIN 81 MG/1
81 TABLET, CHEWABLE ORAL DAILY
Status: DISCONTINUED | OUTPATIENT
Start: 2024-03-06 | End: 2024-03-20 | Stop reason: HOSPADM

## 2024-03-05 RX ORDER — IPRATROPIUM BROMIDE AND ALBUTEROL SULFATE 2.5; .5 MG/3ML; MG/3ML
1 SOLUTION RESPIRATORY (INHALATION) EVERY 4 HOURS PRN
Status: DISCONTINUED | OUTPATIENT
Start: 2024-03-05 | End: 2024-03-20 | Stop reason: HOSPADM

## 2024-03-05 RX ORDER — ENOXAPARIN SODIUM 100 MG/ML
40 INJECTION SUBCUTANEOUS DAILY
Status: DISCONTINUED | OUTPATIENT
Start: 2024-03-06 | End: 2024-03-06

## 2024-03-05 RX ORDER — ACETAMINOPHEN 650 MG/1
650 SUPPOSITORY RECTAL EVERY 6 HOURS PRN
Status: DISCONTINUED | OUTPATIENT
Start: 2024-03-05 | End: 2024-03-20 | Stop reason: HOSPADM

## 2024-03-05 RX ORDER — ONDANSETRON 4 MG/1
4 TABLET, ORALLY DISINTEGRATING ORAL EVERY 8 HOURS PRN
Status: DISCONTINUED | OUTPATIENT
Start: 2024-03-05 | End: 2024-03-20 | Stop reason: HOSPADM

## 2024-03-05 RX ORDER — POLYETHYLENE GLYCOL 3350 17 G/17G
17 POWDER, FOR SOLUTION ORAL DAILY PRN
Status: DISCONTINUED | OUTPATIENT
Start: 2024-03-05 | End: 2024-03-20 | Stop reason: HOSPADM

## 2024-03-05 RX ORDER — ACETAMINOPHEN 325 MG/1
650 TABLET ORAL EVERY 6 HOURS PRN
Status: DISCONTINUED | OUTPATIENT
Start: 2024-03-05 | End: 2024-03-20 | Stop reason: HOSPADM

## 2024-03-05 RX ORDER — POTASSIUM CHLORIDE 20 MEQ/1
40 TABLET, EXTENDED RELEASE ORAL PRN
Status: DISCONTINUED | OUTPATIENT
Start: 2024-03-05 | End: 2024-03-20 | Stop reason: HOSPADM

## 2024-03-05 RX ORDER — SODIUM CHLORIDE 0.9 % (FLUSH) 0.9 %
5-40 SYRINGE (ML) INJECTION EVERY 12 HOURS SCHEDULED
Status: DISCONTINUED | OUTPATIENT
Start: 2024-03-05 | End: 2024-03-20 | Stop reason: HOSPADM

## 2024-03-05 RX ORDER — MAGNESIUM SULFATE IN WATER 40 MG/ML
2000 INJECTION, SOLUTION INTRAVENOUS PRN
Status: DISCONTINUED | OUTPATIENT
Start: 2024-03-05 | End: 2024-03-20 | Stop reason: HOSPADM

## 2024-03-05 RX ADMIN — FUROSEMIDE 20 MG: 10 INJECTION, SOLUTION INTRAMUSCULAR; INTRAVENOUS at 21:25

## 2024-03-05 RX ADMIN — IOPAMIDOL 75 ML: 755 INJECTION, SOLUTION INTRAVENOUS at 23:54

## 2024-03-05 ASSESSMENT — PAIN - FUNCTIONAL ASSESSMENT: PAIN_FUNCTIONAL_ASSESSMENT: NONE - DENIES PAIN

## 2024-03-05 NOTE — ED PROVIDER NOTES
[facility] EMERGENCY DEPARTMENT  EMERGENCY DEPARTMENT ENCOUNTER        Pt Name: Olive Mckeon  MRN: [id number]  Birthdate 1935  Date of evaluation: 3/5/2024  Provider: MARSHALL Pascual - ORLIN  PCP: Blane Hale MD  Note Started: 5:36 PM EST 3/5/24       I have seen and evaluated this patient with my supervising physician Jalen Rutherford MD.      CHIEF COMPLAINT       Chief Complaint   Patient presents with    Shortness of Breath     Came by  EMS from Dr. Hale's office d/t hypoxia in the 70s RA. 3 L baseline. A&O. No labored breathing. Recently d/c'd from hospital approx 2 weeks ago.        HISTORY OF PRESENT ILLNESS: 1 or more Elements     History from : Patient and Family daughter and granddaughter    Limitations to history : None    Olive Mckeon is a 88 y.o. female who presents to the emergency department with increasing shortness of breath.  The patient called her daughter this morning, not feeling well having difficulty breathing.  She was unable to get out of bed due to shortness of breath.  She is wearing her 1 and half liters of oxygen as prescribed, arrived at the doctor's office and her oxygen saturation was in the 70s on the 1 and half liters of oxygen.    Patient reports that she was recently admitted for pneumonia and bronchitis.      Denies any headache, fever, lightheadedness, dizziness, visual disturbances.  No chest pain or pressure.  No neck or back pain.  No shortness of breath, cough, or congestion.  No abdominal pain, nausea, vomiting, diarrhea, constipation, or dysuria.  No rash.    Nursing Notes were all reviewed and agreed with or any disagreements were addressed in the HPI.    REVIEW OF SYSTEMS :      Review of Systems   Constitutional:  Negative for activity change, chills and fever.   HENT:  Positive for congestion.    Respiratory:  Positive for cough, chest tightness and shortness of breath.    Cardiovascular:  Negative for chest pain.

## 2024-03-05 NOTE — PROGRESS NOTES
Pharmacy Home Medication Reconciliation Note    A medication reconciliation has been completed for Olive Mckeon 1935    Pharmacy: [address]  Information provided by: patient and daughter    The patient's home medication list is as follows:  No current facility-administered medications on file prior to encounter.     Current Outpatient Medications on File Prior to Encounter   Medication Sig Dispense Refill    vitamin D 25 MCG (1000 UT) CAPS Take 1 capsule by mouth daily      fluticasone (FLONASE) 50 MCG/ACT nasal spray 1 spray by Each Nostril route in the morning and at bedtime 16 g 1    sodium chloride (OCEAN, BABY AYR) 0.65 % nasal spray 1 spray by Nasal route as needed for Congestion (Patient not taking: Reported on 3/5/2024) 1 each 1    donepezil (ARICEPT) 10 MG tablet Take 2 tablets by mouth daily (Patient taking differently: Take 2 tablets by mouth nightly) 180 tablet 1    levothyroxine (SYNTHROID) 100 MCG tablet Take 1 tablet by mouth daily 90 tablet 1    estradiol (ESTRACE) 0.1 MG/GM vaginal cream Place 2 g vaginally daily as needed      aspirin 81 MG tablet Take 1 tablet by mouth daily      docusate sodium (COLACE) 100 MG capsule Take 1 capsule by mouth 2 times daily as needed for Constipation      Ibuprofen-diphenhydrAMINE HCl 200-25 MG CAPS Take 1 tablet by mouth nightly          Patient is no longer taking ocean nasal spray.    Of note, patient took all AM meds prior to ED arrival.    Timing of last doses updated.    Thank you,  Melissa Islas CPhT

## 2024-03-06 PROBLEM — I26.99 ACUTE PULMONARY EMBOLISM WITHOUT ACUTE COR PULMONALE (HCC): Status: ACTIVE | Noted: 2024-03-06

## 2024-03-06 PROBLEM — R79.89 ELEVATED BRAIN NATRIURETIC PEPTIDE (BNP) LEVEL: Status: ACTIVE | Noted: 2024-03-06

## 2024-03-06 PROBLEM — I48.91 ATRIAL FIBRILLATION WITH RAPID VENTRICULAR RESPONSE (HCC): Status: ACTIVE | Noted: 2024-03-06

## 2024-03-06 PROBLEM — R68.89 INCREASED OXYGEN DEMAND: Status: ACTIVE | Noted: 2024-03-06

## 2024-03-06 PROBLEM — I48.0 PAROXYSMAL ATRIAL FIBRILLATION (HCC): Status: ACTIVE | Noted: 2024-03-06

## 2024-03-06 LAB
EKG ATRIAL RATE: 97 BPM
EKG DIAGNOSIS: NORMAL
EKG P AXIS: 8 DEGREES
EKG P-R INTERVAL: 150 MS
EKG Q-T INTERVAL: 334 MS
EKG QRS DURATION: 76 MS
EKG QTC CALCULATION (BAZETT): 424 MS
EKG R AXIS: -27 DEGREES
EKG T AXIS: 37 DEGREES
EKG VENTRICULAR RATE: 97 BPM

## 2024-03-06 PROCEDURE — 6370000000 HC RX 637 (ALT 250 FOR IP): Performed by: INTERNAL MEDICINE

## 2024-03-06 PROCEDURE — 1200000000 HC SEMI PRIVATE

## 2024-03-06 PROCEDURE — 93005 ELECTROCARDIOGRAM TRACING: CPT | Performed by: STUDENT IN AN ORGANIZED HEALTH CARE EDUCATION/TRAINING PROGRAM

## 2024-03-06 PROCEDURE — 6360000002 HC RX W HCPCS: Performed by: INTERNAL MEDICINE

## 2024-03-06 PROCEDURE — 93010 ELECTROCARDIOGRAM REPORT: CPT | Performed by: INTERNAL MEDICINE

## 2024-03-06 PROCEDURE — 2580000003 HC RX 258: Performed by: INTERNAL MEDICINE

## 2024-03-06 PROCEDURE — 99291 CRITICAL CARE FIRST HOUR: CPT | Performed by: INTERNAL MEDICINE

## 2024-03-06 PROCEDURE — 6370000000 HC RX 637 (ALT 250 FOR IP): Performed by: STUDENT IN AN ORGANIZED HEALTH CARE EDUCATION/TRAINING PROGRAM

## 2024-03-06 PROCEDURE — 6360000002 HC RX W HCPCS: Performed by: STUDENT IN AN ORGANIZED HEALTH CARE EDUCATION/TRAINING PROGRAM

## 2024-03-06 PROCEDURE — 93306 TTE W/DOPPLER COMPLETE: CPT

## 2024-03-06 PROCEDURE — 94640 AIRWAY INHALATION TREATMENT: CPT

## 2024-03-06 PROCEDURE — 2700000000 HC OXYGEN THERAPY PER DAY

## 2024-03-06 PROCEDURE — 93970 EXTREMITY STUDY: CPT

## 2024-03-06 RX ORDER — ALBUTEROL SULFATE 2.5 MG/3ML
2.5 SOLUTION RESPIRATORY (INHALATION) EVERY 6 HOURS PRN
Status: DISCONTINUED | OUTPATIENT
Start: 2024-03-06 | End: 2024-03-20 | Stop reason: HOSPADM

## 2024-03-06 RX ORDER — 0.9 % SODIUM CHLORIDE 0.9 %
250 INTRAVENOUS SOLUTION INTRAVENOUS ONCE
Status: COMPLETED | OUTPATIENT
Start: 2024-03-06 | End: 2024-03-06

## 2024-03-06 RX ORDER — ENOXAPARIN SODIUM 100 MG/ML
1 INJECTION SUBCUTANEOUS 2 TIMES DAILY
Status: DISCONTINUED | OUTPATIENT
Start: 2024-03-06 | End: 2024-03-08

## 2024-03-06 RX ORDER — DIGOXIN 0.25 MG/ML
250 INJECTION INTRAMUSCULAR; INTRAVENOUS DAILY
Status: COMPLETED | OUTPATIENT
Start: 2024-03-06 | End: 2024-03-08

## 2024-03-06 RX ORDER — FUROSEMIDE 10 MG/ML
40 INJECTION INTRAMUSCULAR; INTRAVENOUS DAILY
Status: DISCONTINUED | OUTPATIENT
Start: 2024-03-06 | End: 2024-03-06

## 2024-03-06 RX ORDER — LISINOPRIL 5 MG/1
5 TABLET ORAL DAILY
Status: DISCONTINUED | OUTPATIENT
Start: 2024-03-06 | End: 2024-03-06

## 2024-03-06 RX ADMIN — FUROSEMIDE 40 MG: 10 INJECTION, SOLUTION INTRAMUSCULAR; INTRAVENOUS at 08:36

## 2024-03-06 RX ADMIN — SODIUM CHLORIDE 250 ML: 9 INJECTION, SOLUTION INTRAVENOUS at 18:07

## 2024-03-06 RX ADMIN — SODIUM CHLORIDE: 9 INJECTION, SOLUTION INTRAVENOUS at 02:01

## 2024-03-06 RX ADMIN — LEVOTHYROXINE SODIUM 100 MCG: 0.1 TABLET ORAL at 08:35

## 2024-03-06 RX ADMIN — CHOLECALCIFEROL TAB 25 MCG (1000 UNIT) 1000 UNITS: 25 TAB at 08:35

## 2024-03-06 RX ADMIN — DIGOXIN 250 MCG: 0.25 INJECTION INTRAMUSCULAR; INTRAVENOUS at 16:21

## 2024-03-06 RX ADMIN — Medication 10 ML: at 20:53

## 2024-03-06 RX ADMIN — METOPROLOL TARTRATE 12.5 MG: 25 TABLET, FILM COATED ORAL at 20:52

## 2024-03-06 RX ADMIN — ENOXAPARIN SODIUM 70 MG: 100 INJECTION SUBCUTANEOUS at 08:35

## 2024-03-06 RX ADMIN — ENOXAPARIN SODIUM 70 MG: 100 INJECTION SUBCUTANEOUS at 02:02

## 2024-03-06 RX ADMIN — DONEPEZIL HYDROCHLORIDE 20 MG: 5 TABLET, FILM COATED ORAL at 20:52

## 2024-03-06 RX ADMIN — METOPROLOL TARTRATE 12.5 MG: 25 TABLET, FILM COATED ORAL at 11:07

## 2024-03-06 RX ADMIN — ENOXAPARIN SODIUM 70 MG: 100 INJECTION SUBCUTANEOUS at 20:52

## 2024-03-06 RX ADMIN — CEFEPIME 2000 MG: 2 INJECTION, POWDER, FOR SOLUTION INTRAVENOUS at 02:02

## 2024-03-06 RX ADMIN — IPRATROPIUM BROMIDE AND ALBUTEROL SULFATE 1 DOSE: .5; 3 SOLUTION RESPIRATORY (INHALATION) at 01:28

## 2024-03-06 RX ADMIN — Medication 10 ML: at 08:35

## 2024-03-06 RX ADMIN — Medication 10 ML: at 01:10

## 2024-03-06 RX ADMIN — LISINOPRIL 5 MG: 5 TABLET ORAL at 11:07

## 2024-03-06 RX ADMIN — ASPIRIN 81 MG 81 MG: 81 TABLET ORAL at 08:35

## 2024-03-06 ASSESSMENT — PAIN SCALES - GENERAL
PAINLEVEL_OUTOF10: 0

## 2024-03-06 NOTE — H&P
discharged on 2 L of oxygen.  She did not have any baseline lung issues prior to that hospitalization.  She had been doing well she was back driving and on the day of presentation she woke up feeling very short of breath and deeply fatigued.  She had gone to her primary care physician and she was found to be hypoxic saturating in the 70s and she was sent to the ER in the ER she required up to 5 L of oxygen to maintain her oxygen saturation.  Blood work done showed a BNP was elevated at 1408 troponin was a 17 repeat was 17 and there was no ischemic changes on EKG.  Patient received a dose of Lasix and at the time of evaluation she looked comfortable.  She denied any wheezing or cough prior to this and she just woke up feeling that short of breath she was also mildly tachycardic.  X-ray had shown some mild prominent interstitial markings.     Review of Systems:        Pertinent positives and negatives discussed in HPI     Objective:   No intake or output data in the 24 hours ending 03/05/24 2344   Vitals:   Vitals:    03/05/24 1658 03/05/24 2125   BP: 132/62 (!) 127/56   Pulse: 98    Resp: 20    Temp: 97.8 °F (36.6 °C)    TempSrc: Oral    SpO2: 92%    Weight: 63.5 kg (140 lb)    Height: 1.727 m (5' 8\")        Medications Prior to Admission     Prior to Admission medications    Medication Sig Start Date End Date Taking? Authorizing Provider   vitamin D 25 MCG (1000 UT) CAPS Take 1 capsule by mouth daily   Yes Provider, MD Jd   fluticasone (FLONASE) 50 MCG/ACT nasal spray 1 spray by Each Nostril route in the morning and at bedtime 2/17/24   Brittany Muñiz MD   sodium chloride (OCEAN, BABY AYR) 0.65 % nasal spray 1 spray by Nasal route as needed for Congestion  Patient not taking: Reported on 3/5/2024 2/17/24   Brittany Muñiz MD   donepezil (ARICEPT) 10 MG tablet Take 2 tablets by mouth daily  Patient taking differently: Take 2 tablets by mouth nightly 12/21/23   Blane Hale MD   levothyroxine

## 2024-03-06 NOTE — CONSULTS
[facility]  Advanced CHF/Pulmonary Hypertension   Cardiac Evaluation      Olive Mckeon  YOB: 1935    Requesting PHysician:  Dr. Hines      Chief Complaint   Patient presents with    Shortness of Breath     Came by [facility] from Dr. Hale's office d/t hypoxia in the 70s RA. 3 L baseline. A&O. No labored breathing. Recently d/c'd from hospital approx 2 weeks ago.         History of Present Illness:  Olive Mckeon is an 87 yo female with hypothyroidism, dementia who was recently diagnosed with lung disease.  She was well until about a month ago when she was hospitalized in February for 10 days for pneumonia and respiratory failure.  She was discharged on 2 liters oxygen.  She was unaware of any baseline lung issues prior to that hospitalization.  Over the last few days, she has been having increasing shortness of breath.  Yesterday she woke up very short of breath and deeply fatigued.  She went to her PCP office and was found to have oxygen sats in the 70s and was sent to the ER where she required 5 liters of oxygen to maintain her oxygen saturation.  Her BNP is elevated at 1408, troponin 17.  No ischemic changes on EKG.  She received a dose of lasix.  No cough or wheezing.  She was mildly tachycardic in the ER.  CT of chest shows new LLL pulmonary embolism.    She denies headache, fever, lightheadedness, dizziness, visual disturbances.  No chest pain or pressure.  No neck or back pain.  No abdominal pain, nausea, vomiting, diarrhea, constipation.  No rash.     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  No recent travel or sick contacts.  She is a lifelong nonsmoker.      We are consulted for elevated BNP level.  Echo today shows mild concentric LVH, normal LVEF, grade 1 diastolic dysfunction, normal LV filling pressures, normal RV size and function.  Just prior to me entering her room today, she went into afib with RVR with rates in the 110's.

## 2024-03-06 NOTE — PROGRESS NOTES
Purpose of Encounter: Advanced care planning in light of hospitalization for acute resp failure, chf, pulmonary fibrosis  Parties in attendance: :  Al Marie MD,   Decisional Capacity:Yes  Objective: understands this discussion is for GOC  Goals of Care Determinations:   Discussed in details about GOC. With  acute resp failure  and potential worsening , she may require mechanical ventillation if other NIV fails, she is reluctant to be on vent but is  agreeable for short term  only in case of resp failure but not cardiac arrest.  Doesn't  want to be resuscitated  in case of cardiac arrest . Reports she has had a good life and doesn't want to l stay alive in any form that may have poor quality of life  Code Status: DNR CCA  Time Spent on Advanced Planning Documents: 19  mins.  Advanced Care Planning Documents:   Completed advances directives, advanced directives in chart. POA daughter Britany Vickers

## 2024-03-06 NOTE — PROGRESS NOTES
Olive Mckeon  : 1935  Encounter date: 3/5/2024    This enedelia 88 y.o. female who presents with  Chief Complaint   Patient presents with    Shortness of Breath       History of present illness:    HPI PT is 88 year old female with daughter and granddaughter for acute respiratory failure.  Previous hospitalization for same complaint, pt has been home > 1 week.  Reports having PT and home oxygen decreased from 2-1-1.5.  PT is very upset, agitated, not wanting to go to hospital, in office sats 78-83% on 5L, tachycardia 110-120's and tachypneic.  Pt convinced to go to ED, 911 called, daughter rode along in squad to [facility] .    No current facility-administered medications on file prior to visit.     Current Outpatient Medications on File Prior to Visit   Medication Sig Dispense Refill    fluticasone (FLONASE) 50 MCG/ACT nasal spray 1 spray by Each Nostril route in the morning and at bedtime 16 g 1    sodium chloride (OCEAN, BABY AYR) 0.65 % nasal spray 1 spray by Nasal route as needed for Congestion (Patient not taking: Reported on 3/5/2024) 1 each 1    donepezil (ARICEPT) 10 MG tablet Take 2 tablets by mouth daily (Patient not taking: Reported on 3/6/2024) 180 tablet 1    levothyroxine (SYNTHROID) 100 MCG tablet Take 1 tablet by mouth daily 90 tablet 1    estradiol (ESTRACE) 0.1 MG/GM vaginal cream Place 2 g vaginally daily as needed      aspirin 81 MG tablet Take 1 tablet by mouth daily      docusate sodium (COLACE) 100 MG capsule Take 1 capsule by mouth 2 times daily as needed for Constipation      Ibuprofen-diphenhydrAMINE HCl 200-25 MG CAPS Take 1 tablet by mouth nightly         No Known Allergies  Past Medical History:   Diagnosis Date    Adhesive capsulitis of shoulder     Hypothyroidism     On home O2     Osteoporosis, unspecified     Routine general medical examination at a health care facility     Seborrheic dermatitis, unspecified     Unspecified disorder of skin and subcutaneous tissue       Past

## 2024-03-06 NOTE — RT PROTOCOL NOTE
RT Nebulizer Bronchodilator Protocol Note    There is a bronchodilator order in the chart from a provider indicating to follow the RT Bronchodilator Protocol and there is an “Initiate RT Bronchodilator Protocol” order as well (see protocol at bottom of note).    CXR Findings:  XR CHEST PORTABLE    Result Date: 3/5/2024  The mediastinum appears to widen which is new from the previous study.  This may be related to fluid overload       The findings from the last RT Protocol Assessment were as follows:  Smoking: None or smoker <15 pack years  Respiratory Pattern: Regular pattern and RR 12-20 bpm  Breath Sounds: Slightly diminished and/or crackles  Cough: Strong, spontaneous, non-productive  Indication for Bronchodilator Therapy:    Bronchodilator Assessment Score: 2    Aerosolized bronchodilator medication orders have been revised according to the RT Nebulizer Bronchodilator Protocol below.    Respiratory Therapist to perform RT Therapy Protocol Assessment initially then follow the protocol.  Repeat RT Therapy Protocol Assessment PRN for score 0-3 or on second treatment, BID, and PRN for scores above 3.    No Indications - adjust the frequency to every 6 hours PRN wheezing or bronchospasm, if no treatments needed after 48 hours then discontinue using Per Protocol order mode.     If indication present, adjust the RT bronchodilator orders based on the Bronchodilator Assessment Score as indicated below.  If a patient is on this medication at home then do not decrease Frequency below that used at home.    0-3 - enter or revise RT bronchodilator order(s) to equivalent RT Bronchodilator order with Frequency of every 4 hours PRN for wheezing or increased work of breathing using Per Protocol order mode.       4-6 - enter or revise RT Bronchodilator order(s) to two equivalent RT bronchodilator orders with one order with BID Frequency and one order with Frequency of every 4 hours PRN wheezing or increased work of breathing using

## 2024-03-06 NOTE — CONSULTS
[facility]   Electrophysiology   Date: 3/6/2024  Reason for Consultation: Atrial fibrillation    Consult Requesting Physician: Dr. Deya MD     Chief Complaint   Patient presents with    Shortness of Breath     Came by [facility] from Dr. Hale's office d/t hypoxia in the 70s RA. 3 L baseline. A&O. No labored breathing. Recently d/c'd from hospital approx 2 weeks ago.        CC: Shortness of breath    HPI: Olive Mckeon is a 88 y.o. female with history of COVID related pulmonary fibrosis, presented to the hospital with shortness of breath and hypoxemia. In emergency room she was found to have high proBNP and treated with Lasix.  She remained mildly tachycardic in the emergency room and CT scan showed new left lower lobe pulmonary embolism.    She has been seen by heart failure Dr. Falk and EP has been consulted for atrial fibrillation.    Patient reports no prior history of atrial arrhythmia.  Her daughter at bedside and provide history.  She has had shortness of breath and occasional dizzy spells.  No chest pain.    She is not feeling palpitation with atrial fibrillation.    Assessment:   Atrial fibrillation with rapid ventricular response, new diagnosis  Pulmonary embolism  Acute hypoxic respiratory failure  Mild dementia  Pulmonary fibrosis    Plan:   Patient remains in atrial fibrillation with rapid ventricular response.  She is hypotensive.      She did receive lisinopril, metoprolol and diuretics by hospitalist team with a diagnosis of PE.    Options are limited.  Since she has pulmonary fibrosis and hypoxic respiratory failure, amiodarone is not recommended at this time.    She is receiving IV fluid per heart failure and also 250 mcg digoxin for atrial fibrillation rapid ventricular response.    Can consider IV diltiazem if her blood pressure improves.  If tachycardia is not controlled and BP remains low, cardioversion can be considered.     Not a candidate for invasive approach.

## 2024-03-06 NOTE — PROGRESS NOTES
Got a call from radiologist patient has a small PE in the inferior left side of the lung.  No evidence of heart strain.  Increased groundglass opacities when compared to previous study in February.    Plan  Switch Lovenox to therapeutic dose Lovenox  Will add cefepime 1 g IV Q8  Continue with previous plan as outlined with the echocardiogram and Lasix.

## 2024-03-06 NOTE — DISCHARGE INSTR - COC
Continuity of Care Form  HF Pathway    _x_ Daily Visits x 3     _x_Cardiovascular Assessment. Titrate O2 to keep SaO2 greater than 90%    _x_ Daily Weights- Baseline Wt: 140 lb   Call MD if:   3 pound weight gain or loss in one day OR 5 pound weight gain in one week    _x_No added salt diet (3-4 G sodium) and 64 oz fluid restriction     _x_ Labs:   BMP,BNP     Please start on 3/11   Frequency: Weekly x 4              Fax results to: CHF Clinic: [phone]    _x_ Med List attached:   Hold Coreg/Metoprolol if HR less than 45 or patient symptomatic*   Hold ACE/ARB if SBP less than 85 or patient symptomatic*   Do not hold Spironolactone (aldactone) for hypotension/bradycardia   Call MD for questions: CHF Clinic: [phone]    _x_Follow up appointment with cardiology: date/time: 3/15 with Genesis Pearson          Patient Name: Olive Mckeon   :  1935  MRN:  [id number]    Admit date:  3/5/2024  Discharge date:  ***    Code Status Order: DNR-CCA   Advance Directives:     Admitting Physician:  Shanita Hines MD  PCP: Blane Hale MD    Discharging Nurse: ***  Discharging Hospital Unit/Room#: CVU-2918/2918-01  Discharging Unit Phone Number: ***    Emergency Contact:   Extended Emergency Contact Information  Primary Emergency Contact: Elva Vickers  Home Phone: [phone]  Relation: Child    Past Surgical History:  Past Surgical History:   Procedure Laterality Date    CATARACT REMOVAL      left    CATARACT REMOVAL      right    COLONOSCOPY      OTHER SURGICAL HISTORY      anal fissure       Immunization History:   Immunization History   Administered Date(s) Administered    COVID-19, MODERNA BLUE border, Primary or Immunocompromised, (age 12y+), IM, 100 mcg/0.5mL 2021, 2021    COVID-19, PFIZER Bivalent, DO NOT Dilute, (age 12y+), IM, 30 mcg/0.3 mL 10/18/2022    COVID-19, PFIZER PURPLE top, DILUTE for use, (age 12 y+), 30mcg/0.3mL 2021    Influenza Vaccine, unspecified formulation     Last documented pain score (0-10 scale): Pain Level: 0  Last Weight:   Wt Readings from Last 1 Encounters:   24 65.3 kg (144 lb)     Mental Status:  {IP PT MENTAL STATUS:}    IV Access:  { MICHELLE IV ACCESS:362659976}    Nursing Mobility/ADLs:  Walking   {CHP DME ADLs:784757141}  Transfer  {CHP DME ADLs:502271574}  Bathing  {CHP DME ADLs:061290222}  Dressing  {CHP DME ADLs:281332626}  Toileting  {CHP DME ADLs:785995498}  Feeding  {CHP DME ADLs:702728129}  Med Admin  {CHP DME ADLs:148044775}  Med Delivery   { MICHELLE MED Delivery:478922835}    Wound Care Documentation and Therapy:        Elimination:  Continence:   Bowel: {YES / NO:}  Bladder: {YES / NO:}  Urinary Catheter: {Urinary Catheter:152075705}   Colostomy/Ileostomy/Ileal Conduit: {YES / NO:}       Date of Last BM: ***  No intake or output data in the 24 hours ending 24 1508  No intake/output data recorded.    Safety Concerns:     { MICHELLE Safety Concerns:853617360}    Impairments/Disabilities:      {[facility] Impairments/Disabilities:013441941}    Nutrition Therapy:  Current Nutrition Therapy:   { MICHELLE Diet List:039348929}    Routes of Feeding: {CHP DME Other Feedings:319549479}  Liquids: {Slp liquid thickness:37299}  Daily Fluid Restriction: {CHP DME Yes amt example:724283710}  Last Modified Barium Swallow with Video (Video Swallowing Test): {Done Not Done Date:}    Treatments at the Time of Hospital Discharge:   Respiratory Treatments: ***  Oxygen Therapy:  {Therapy; copd oxygen:71408}  Ventilator:    { CC Vent List:963234962}    Rehab Therapies: {THERAPEUTIC INTERVENTION:6112316727}  Weight Bearing Status/Restrictions: {[facility] Weight Bearin}  Other Medical Equipment (for information only, NOT a DME order):  {EQUIPMENT:161946829}  Other Treatments: ***    Patient's personal belongings (please select all that are sent with patient):  {CHP DME Belongings:408308772}    RN SIGNATURE:

## 2024-03-06 NOTE — PROGRESS NOTES
Pharmacy to check patient copay for  Xarelto and Eliquis    Copay for patient will be: $45 /month for either Xarelto or Eliquis.    Pharmacy will continue to follow the decision on therapy and  the patient if appropriate.       Giulia Houser, PharmD  PGY-1 Pharmacy Resident  [id number]

## 2024-03-06 NOTE — PROGRESS NOTES
Physical/Occupational Therapy  Olive Mckeon    Orders received for PT/OT evaluation. Chart reviewed. Patient with acute PE, has not been on anticoagulation for 24 hours and with bedrest with BSC order. Discussed with nursing. Will hold therapy at this time and will follow up with pt tomorrow as medically appropriate. Thanks, Jane Kerr, PT, DPT [id number], Robert Bell OTR/L  [id number].

## 2024-03-06 NOTE — ED PROVIDER NOTES
This patient was seen by the Mid-Level Provider. I have seen and examined the patient, agree with the workup, evaluation, management and diagnosis. Care plan has been discussed. My assessment reveals an 88-year-old female who presents with generalized weakness and shortness of breath.  This is a 88-year-old female was recently admitted for pneumonia who presents back with increasing weakness and shortness of breath.  The patient states she notices when she try to get out of her chair today and was too weak to get out of it.  When she went to her primary care physician's office she was noted to be hypoxic and sent here for further care.          Radiology results:    XR CHEST PORTABLE   Final Result      The mediastinum appears to widen which is new from the previous study.  This   may be related to fluid overload               LABS:    Labs Reviewed   CBC WITH AUTO DIFFERENTIAL - Abnormal; Notable for the following components:       Result Value    Lymphocytes Absolute 0.6 (*)     All other components within normal limits   COMPREHENSIVE METABOLIC PANEL - Abnormal; Notable for the following components:    Sodium 133 (*)     Glucose 123 (*)     Total Protein 5.9 (*)     Albumin 3.0 (*)     Albumin/Globulin Ratio 1.0 (*)     ALT 8 (*)     All other components within normal limits   TROPONIN - Abnormal; Notable for the following components:    Troponin, High Sensitivity 17 (*)     All other components within normal limits   BRAIN NATRIURETIC PEPTIDE - Abnormal; Notable for the following components:    Pro-BNP 1,408 (*)     All other components within normal limits   BLOOD GAS, VENOUS - Abnormal; Notable for the following components:    pO2, Rik 82.6 (*)     Carboxyhemoglobin 3.3 (*)     All other components within normal limits   COVID-19 & INFLUENZA COMBO   RSV DETECTION   LACTATE, SEPSIS   PROCALCITONIN   LACTATE, SEPSIS   TROPONIN           EKG:    Sinus rhythm at a rate of 97 beats a minute with no acute ST

## 2024-03-06 NOTE — CARE COORDINATION
CM received call from Kristina with [employer]  [phone] that pt is active with them for RN/PT/OT.    Therapy pending at this time. CM will follow for pt progress and discharge plan.     Pt will need hc order on discharge for resumption of care.     Kassidy Dubois RN, BSN  [phone]

## 2024-03-06 NOTE — PROGRESS NOTES
Date of Admission: 3/5/2024. Hospital Day: 2   88 y.o. female with a pmh of hypothyroidism mild dementia who presents with Acute respiratory failure , admitted for chf and  acute pe.  Was recently discharged after management of acute respiratory failure, thought to be from interstitial fibrosis    Assessment/Plan:    Active Hospital Problems    Diagnosis     Acute respiratory failure (HCC) [J96.00]    Acute hypoxic respiratory failure/pulmonary embolism/ acute chf/pulm fibrosis  On 5 L nc  Echocardiogram pending  Continue IV Lasix as tolerated  Strict TONY, monitor BMP and mag  Cardiology consulted  Recent admission for suspected pulmonary fibrosis consult pulm, defer steroid for now      Hypothyroidism  Continue home Synthroid    dementia, mild      DVT Prophylaxis:    Diet: ADULT DIET; Regular  Code Status: DNR-CCA      Dispo - home    All  follow up labs and imaging personally reviewed      Chief Complaint:   Chief Complaint   Patient presents with    Shortness of Breath     Came by [facility] from Dr. Hale's office d/t hypoxia in the 70s RA. 3 L baseline. A&O. No labored breathing. Recently d/c'd from hospital approx 2 weeks ago.          Interval  History:   Reports improvement in breathing but still requiring 5 L, down from 7L yest.       Medications:  Reviewed    Infusion Medications    sodium chloride 10 mL/hr at 03/06/24 0201     Scheduled Medications    enoxaparin  1 mg/kg SubCUTAneous BID    cefepime  2,000 mg IntraVENous Q12H    sodium chloride flush  5-40 mL IntraVENous 2 times per day    aspirin  81 mg Oral Daily    donepezil  20 mg Oral Nightly    levothyroxine  100 mcg Oral Daily    Vitamin D  1,000 Units Oral Daily    furosemide  20 mg IntraVENous Daily     PRN Meds: perflutren lipid microspheres, sodium chloride flush, sodium chloride, potassium chloride **OR** potassium alternative oral replacement **OR** potassium chloride, magnesium sulfate, ondansetron **OR** ondansetron, polyethylene glycol,

## 2024-03-06 NOTE — PROGRESS NOTES
Received patient [facility] ED via stretcher, patient awake, alert and orientedx4, verbal, with oxygen saturation of 77%, hooked patient to highflow oxygen @ 10LPM , saturation went up to 95%. Position patient comfortably, questions were answered, for close monitoring.

## 2024-03-06 NOTE — FLOWSHEET NOTE
03/06/24 1409 03/06/24 1410   Vital Signs   BP (!) 89/51 (!) 86/46   MAP (Calculated) 64 59   MAP (mmHg) (!) 63 (!) 59   BP Location Right upper arm Left upper arm     Updated MD Marie of blood pressures for family along with ultrasound tech's info of blood clot found in left Leg

## 2024-03-06 NOTE — CARE COORDINATION
03/06/24 1450   Readmission Assessment   Number of Days since last admission? 8-30 days   Previous Disposition Home with Home Health   Who is being Interviewed Caregiver  (Patient's daughter.)   What was the patient's/caregiver's perception as to why they think they needed to return back to the hospital? Other (Comment)  (Patient began to retain fluids.)   Did you visit your Primary Care Physician after you left the hospital, before you returned this time? Yes   Did you see a specialist, such as Cardiac, Pulmonary, Orthopedic Physician, etc. after you left the hospital? No   Who advised the patient to return to the hospital? Physician  (Was admitted to hospital from PCP's office.)   Does the patient report anything that got in the way of taking their medications? No   In our efforts to provide the best possible care to you and others like you, can you think of anything that we could have done to help you after you left the hospital the first time, so that you might not have needed to return so soon? Other (Comment)  (No nothing could have been done.)

## 2024-03-06 NOTE — CONSULTS
Mother 80    Cancer Paternal Aunt         breast       MEDICATIONS:     No current facility-administered medications on file prior to encounter.     Current Outpatient Medications on File Prior to Encounter   Medication Sig Dispense Refill    vitamin D 25 MCG (1000 UT) CAPS Take 1 capsule by mouth daily      fluticasone (FLONASE) 50 MCG/ACT nasal spray 1 spray by Each Nostril route in the morning and at bedtime 16 g 1    sodium chloride (OCEAN, BABY AYR) 0.65 % nasal spray 1 spray by Nasal route as needed for Congestion (Patient not taking: Reported on 3/5/2024) 1 each 1    donepezil (ARICEPT) 10 MG tablet Take 2 tablets by mouth daily (Patient not taking: Reported on 3/6/2024) 180 tablet 1    levothyroxine (SYNTHROID) 100 MCG tablet Take 1 tablet by mouth daily 90 tablet 1    estradiol (ESTRACE) 0.1 MG/GM vaginal cream Place 2 g vaginally daily as needed      aspirin 81 MG tablet Take 1 tablet by mouth daily      docusate sodium (COLACE) 100 MG capsule Take 1 capsule by mouth 2 times daily as needed for Constipation      Ibuprofen-diphenhydrAMINE HCl 200-25 MG CAPS Take 1 tablet by mouth nightly           enoxaparin  1 mg/kg SubCUTAneous BID    furosemide  40 mg IntraVENous Daily    lisinopril  5 mg Oral Daily    metoprolol tartrate  12.5 mg Oral BID    sodium chloride flush  5-40 mL IntraVENous 2 times per day    aspirin  81 mg Oral Daily    donepezil  20 mg Oral Nightly    levothyroxine  100 mcg Oral Daily    Vitamin D  1,000 Units Oral Daily      sodium chloride 10 mL/hr at 03/06/24 0201     perflutren lipid microspheres, sodium chloride flush, sodium chloride, potassium chloride **OR** potassium alternative oral replacement **OR** potassium chloride, magnesium sulfate, ondansetron **OR** ondansetron, polyethylene glycol, acetaminophen **OR** acetaminophen, docusate sodium, ipratropium 0.5 mg-albuterol 2.5 mg      ALLERGIES:   Allergies as of 03/05/2024    (No Known Allergies)      OBJECTIVE:   height is 1.727 m  caring for this patient with life threatening illness, including direct patient contact, management of life support systems, review of data including imaging and labs, discussions with other team members and physicians is at least 32 minutes so far today, excluding procedures.        Randy Fuentes MD, [facility], Critical Care and Sleep Medicine  [address], [address], [place] [address]  3/5/2024, 1:04 PM          This note was completed using dragon medical speech recognition software. Grammatical errors, random word insertions, pronoun errors and incomplete sentences are occasional consequences of this technology due to software limitations. If there are questions or concerns about the content of this note of information contained within the body of this dictation they should be addressed with the provider for clarification.

## 2024-03-07 ENCOUNTER — APPOINTMENT (OUTPATIENT)
Dept: GENERAL RADIOLOGY | Age: 89
DRG: 189 | End: 2024-03-07
Payer: COMMERCIAL

## 2024-03-07 PROBLEM — U09.9 POST-COVID-19 SYNDROME: Status: ACTIVE | Noted: 2024-03-07

## 2024-03-07 PROBLEM — F03.A0 MILD DEMENTIA WITHOUT BEHAVIORAL DISTURBANCE, PSYCHOTIC DISTURBANCE, MOOD DISTURBANCE, OR ANXIETY (HCC): Status: ACTIVE | Noted: 2023-02-06

## 2024-03-07 PROBLEM — I50.33 ACUTE ON CHRONIC DIASTOLIC HEART FAILURE (HCC): Status: ACTIVE | Noted: 2024-03-07

## 2024-03-07 PROBLEM — I50.31 ACUTE DIASTOLIC (CONGESTIVE) HEART FAILURE (HCC): Status: ACTIVE | Noted: 2024-03-07

## 2024-03-07 PROBLEM — J81.0 ACUTE PULMONARY EDEMA (HCC): Status: ACTIVE | Noted: 2024-03-07

## 2024-03-07 LAB
ANION GAP SERPL CALCULATED.3IONS-SCNC: 10 MMOL/L (ref 3–16)
BUN SERPL-MCNC: 16 MG/DL (ref 7–20)
CALCIUM SERPL-MCNC: 8.5 MG/DL (ref 8.3–10.6)
CHLORIDE SERPL-SCNC: 97 MMOL/L (ref 99–110)
CO2 SERPL-SCNC: 26 MMOL/L (ref 21–32)
CREAT SERPL-MCNC: 0.8 MG/DL (ref 0.6–1.2)
GFR SERPLBLD CREATININE-BSD FMLA CKD-EPI: >60 ML/MIN/{1.73_M2}
GLUCOSE SERPL-MCNC: 116 MG/DL (ref 70–99)
MAGNESIUM SERPL-MCNC: 2 MG/DL (ref 1.8–2.4)
POTASSIUM SERPL-SCNC: 3.9 MMOL/L (ref 3.5–5.1)
SODIUM SERPL-SCNC: 133 MMOL/L (ref 136–145)

## 2024-03-07 PROCEDURE — 6370000000 HC RX 637 (ALT 250 FOR IP): Performed by: INTERNAL MEDICINE

## 2024-03-07 PROCEDURE — 83735 ASSAY OF MAGNESIUM: CPT

## 2024-03-07 PROCEDURE — 6370000000 HC RX 637 (ALT 250 FOR IP): Performed by: STUDENT IN AN ORGANIZED HEALTH CARE EDUCATION/TRAINING PROGRAM

## 2024-03-07 PROCEDURE — 6360000002 HC RX W HCPCS: Performed by: CLINICAL NURSE SPECIALIST

## 2024-03-07 PROCEDURE — 6360000002 HC RX W HCPCS: Performed by: INTERNAL MEDICINE

## 2024-03-07 PROCEDURE — 97530 THERAPEUTIC ACTIVITIES: CPT

## 2024-03-07 PROCEDURE — 99232 SBSQ HOSP IP/OBS MODERATE 35: CPT | Performed by: NURSE PRACTITIONER

## 2024-03-07 PROCEDURE — 94761 N-INVAS EAR/PLS OXIMETRY MLT: CPT

## 2024-03-07 PROCEDURE — 99291 CRITICAL CARE FIRST HOUR: CPT | Performed by: INTERNAL MEDICINE

## 2024-03-07 PROCEDURE — 2580000003 HC RX 258: Performed by: INTERNAL MEDICINE

## 2024-03-07 PROCEDURE — 97162 PT EVAL MOD COMPLEX 30 MIN: CPT

## 2024-03-07 PROCEDURE — 80048 BASIC METABOLIC PNL TOTAL CA: CPT

## 2024-03-07 PROCEDURE — 97535 SELF CARE MNGMENT TRAINING: CPT

## 2024-03-07 PROCEDURE — 71045 X-RAY EXAM CHEST 1 VIEW: CPT

## 2024-03-07 PROCEDURE — 2700000000 HC OXYGEN THERAPY PER DAY

## 2024-03-07 PROCEDURE — 94640 AIRWAY INHALATION TREATMENT: CPT

## 2024-03-07 PROCEDURE — 97166 OT EVAL MOD COMPLEX 45 MIN: CPT

## 2024-03-07 PROCEDURE — 1200000000 HC SEMI PRIVATE

## 2024-03-07 PROCEDURE — 99233 SBSQ HOSP IP/OBS HIGH 50: CPT | Performed by: CLINICAL NURSE SPECIALIST

## 2024-03-07 RX ORDER — FUROSEMIDE 10 MG/ML
20 INJECTION INTRAMUSCULAR; INTRAVENOUS DAILY
Status: DISCONTINUED | OUTPATIENT
Start: 2024-03-07 | End: 2024-03-08

## 2024-03-07 RX ADMIN — LEVOTHYROXINE SODIUM 100 MCG: 0.1 TABLET ORAL at 09:03

## 2024-03-07 RX ADMIN — ENOXAPARIN SODIUM 70 MG: 100 INJECTION SUBCUTANEOUS at 09:04

## 2024-03-07 RX ADMIN — ENOXAPARIN SODIUM 70 MG: 100 INJECTION SUBCUTANEOUS at 22:20

## 2024-03-07 RX ADMIN — ASPIRIN 81 MG 81 MG: 81 TABLET ORAL at 09:03

## 2024-03-07 RX ADMIN — METOPROLOL TARTRATE 12.5 MG: 25 TABLET, FILM COATED ORAL at 21:48

## 2024-03-07 RX ADMIN — DIGOXIN 250 MCG: 0.25 INJECTION INTRAMUSCULAR; INTRAVENOUS at 09:04

## 2024-03-07 RX ADMIN — IPRATROPIUM BROMIDE AND ALBUTEROL SULFATE 1 DOSE: .5; 3 SOLUTION RESPIRATORY (INHALATION) at 09:05

## 2024-03-07 RX ADMIN — Medication 10 ML: at 22:20

## 2024-03-07 RX ADMIN — DONEPEZIL HYDROCHLORIDE 20 MG: 5 TABLET, FILM COATED ORAL at 21:48

## 2024-03-07 RX ADMIN — CHOLECALCIFEROL TAB 25 MCG (1000 UNIT) 1000 UNITS: 25 TAB at 09:03

## 2024-03-07 RX ADMIN — METOPROLOL TARTRATE 12.5 MG: 25 TABLET, FILM COATED ORAL at 09:03

## 2024-03-07 RX ADMIN — FUROSEMIDE 20 MG: 10 INJECTION, SOLUTION INTRAMUSCULAR; INTRAVENOUS at 11:22

## 2024-03-07 ASSESSMENT — PAIN SCALES - GENERAL: PAINLEVEL_OUTOF10: 0

## 2024-03-07 NOTE — PROGRESS NOTES
[facility] - Inpatient Rehabilitation Department   Phone: [phone]    Physical Therapy    [] Initial Evaluation            [] Daily Treatment Note         [] Discharge Summary      Patient: Olive Mckeon   : 1935   MRN: [id number]   Date of Service:  3/7/2024  Admitting Diagnosis: Acute respiratory failure (HCC)  Current Admission Summary: She presented to the hospital in feb for pneumonia/covid related pulmonary fibrosis and discharged on 2 L of oxygen. She is now having increased shortness of breath and found to have sats in the 70s. Bnp 1408 she received some IV lasix. CT chest shows new LLL pulmonary embolism. Echo with normal LVEF and grade 1 diastolic dysfunction. She went into AF with RVR. Dopplers show acute LLE DVT with plan to switch Lovenox to therapeutic dose Lovenox     Past Medical History:  has a past medical history of Adhesive capsulitis of shoulder, Hypothyroidism, On home O2, Osteoporosis, unspecified, Routine general medical examination at a health care facility, Seborrheic dermatitis, unspecified, and Unspecified disorder of skin and subcutaneous tissue.  Past Surgical History:  has a past surgical history that includes Cataract removal; Cataract removal; other surgical history; and Colonoscopy.  Discharge Recommendations: Olive Mckeon scored a 14/24 on the AM-PAC short mobility form. Current research shows that an AM-PAC score of 17 or less is typically not associated with a discharge to the patient's home setting. Based on the patient's AM-PAC score and their current functional mobility deficits, it is recommended that the patient have 3-5 sessions per week of Physical Therapy at d/c to increase the patient's independence.  Please see assessment section for further patient specific details.    If patient discharges prior to next session this note will serve as a discharge summary.  Please see below for the latest assessment towards goals.     DME Required For Discharge:

## 2024-03-07 NOTE — PROGRESS NOTES
[facility] [place]   Daily Progress Note      Admit Date:  3/5/2024    HPI:    Ms. Mckeon is an 88 year old female with history of mild dementia, hypothyroidism, lung disease    She presented to the hospital in feb for pneumonia/covid related pulmonary fibrosis and discharged on 2 L of oxygen.  She is now having increased shortness of breath and found to have sats in the 70s.  Bnp 1408 she received some IV lasix.  CT chest shows new LLL pulmonary embolism.  Echo with normal LVEF and grade 1 diastolic dysfunction.  She went into AF with RVR      Subjective:  Patient is being seen for chronic diastolic heart failure. There were no acute overnight cardiac events. HR better controlled and in nsr.  She was on 5->15 L of oxygen this morning.  She is eating breakfast and her son is at her side.  Cxr just done and discussed with Dr Fuentes.  She states she feels fine and doesn't even know the increased oxygen requirements.    Tsh is normal sodium 133    Objective:   BP (!) 97/51   Pulse 76   Temp 98 °F (36.7 °C) (Temporal)   Resp 18   Ht 1.727 m (5' 8\")   Wt 64.7 kg (142 lb 9.6 oz)   SpO2 (!) 77%   BMI 21.68 kg/m²     Intake/Output Summary (Last 24 hours) at 3/7/2024 0859  Last data filed at 3/7/2024 0027  Gross per 24 hour   Intake --   Output 300 ml   Net -300 ml          Physical Exam:  General:  Awake, alert, oriented in NAD  Skin:  Warm and dry.  No unusual bruising or rash  Neck:  Supple.  No JVD or carotid bruit appreciated  Chest:  Normal effort.  Rales in bilateral bases  Cardiovascular:  RRR, S1/S2, no murmur/gallop/rub  Abdomen:  Soft, nontender, +bowel sounds  Extremities:  No edema  Neurological: No focal deficits  Psychological: Normal mood and affect      Medications:    enoxaparin  1 mg/kg SubCUTAneous BID    metoprolol tartrate  12.5 mg Oral BID    digoxin  250 mcg IntraVENous Daily    sodium chloride flush  5-40 mL IntraVENous 2 times per day    aspirin  81 mg Oral Daily    donepezil  20 mg

## 2024-03-07 NOTE — PROGRESS NOTES
Date of Admission: 3/5/2024. Hospital Day: 3   88 y.o. female with a pmh of hypothyroidism mild dementia who presents with Acute respiratory failure , admitted for chf and  acute pe.  Was recently discharged after management of acute respiratory failure, thought to be from interstitial fibrosis    Assessment/Plan:    Active Hospital Problems    Diagnosis     Mild dementia without behavioral disturbance, psychotic disturbance, mood disturbance, or anxiety (HCC) [F03.A0]      Priority: Medium    Acute on chronic diastolic heart failure (HCC) [I50.33]     Increased oxygen demand [R68.89]     Elevated brain natriuretic peptide (BNP) level [R79.89]     Acute pulmonary embolism (HCC) [I26.99]     Paroxysmal atrial fibrillation (HCC) [I48.0]     Atrial fibrillation with RVR (HCC) [I48.91]     Acute respiratory failure (HCC) [J96.00]    Acute hypoxic respiratory failure/ acute pulmonary embolism/ acute chf/pulm fibrosis  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 and acute CHF ppted by  RVR  On 15 L high flow  02 this am, continue to titrate down  Echocardiogram unremarkable, no evidence of right heart strain  XR chest 3/7 revealed progressive bilateral opacities  Start low-dose IV Lasix as tolerated  Strict TONY, monitor BMP and mag  Cardiology consulted  Recent admission for suspected pulmonary fibrosis consult pulm, defer steroid for now    A-fib RVR  Episodes of RVR last night, currently normal sinus rhythm with PVCs  Continue digoxin and metoprolol  Not a candidate for amiodarone or ablation        DVT  Venous Doppler showing DVT in left popliteal and left posterior tibial vein  Continue  lovenox therapeutic        Hypothyroidism  Continue home Synthroid    dementia, mild      DVT Prophylaxis:    Diet: ADULT DIET; Regular  Code Status: DNR-CCA      Dispo - home    All  follow up labs and imaging personally reviewed      Chief Complaint:   Chief Complaint   Patient presents with    Shortness of Breath     Came by [facility]

## 2024-03-07 NOTE — PROGRESS NOTES
[facility] - Inpatient Rehabilitation Department   Phone: [phone]    Occupational Therapy    [x] Initial Evaluation            [] Daily Treatment Note         [] Discharge Summary      Patient: Olive Mckeon   : 1935   MRN: [id number]   Date of Service:  3/7/2024    Admitting Diagnosis:  Acute respiratory failure (HCC)  Current Admission Summary: She presented to the hospital in feb for pneumonia/covid related pulmonary fibrosis and discharged on 2 L of oxygen. She is now having increased shortness of breath and found to have sats in the 70s. Bnp 1408 she received some IV lasix. CT chest shows new LLL pulmonary embolism. Echo with normal LVEF and grade 1 diastolic dysfunction. She went into AF with RVR. Dopplers show acute LLE DVT with plan to switch Lovenox to therapeutic dose Lovenox      Past Medical History:  has a past medical history of Adhesive capsulitis of shoulder, Hypothyroidism, On home O2, Osteoporosis, unspecified, Routine general medical examination at a health care facility, Seborrheic dermatitis, unspecified, and Unspecified disorder of skin and subcutaneous tissue.  Past Surgical History:  has a past surgical history that includes Cataract removal; Cataract removal; other surgical history; and Colonoscopy.    Discharge Recommendations: Olive Mckeon scored a 14/24 on the AM-PAC ADL Inpatient form. Current research shows that an AM-PAC score of 17 or less is typically not associated with a discharge to the patient's home setting. Based on the patient's AM-PAC score and their current ADL deficits, it is recommended that the patient have 3-5 sessions per week of Occupational Therapy at d/c to increase the patient's independence.  Please see assessment section for further patient specific details.    If patient discharges prior to next session this note will serve as a discharge summary.  Please see below for the latest assessment towards goals.       DME Required For Discharge:  DME to be determined at next level of care, DME to be determined pending patient progress    Precautions/Restrictions: high fall risk  Weight Bearing Restrictions: no restrictions  [] Right Upper Extremity  [] Left Upper Extremity [] Right Lower Extremity  [] Left Lower Extremity     Required Braces/Orthotics: no braces required   [] Right  [] Left  Positional Restrictions:no positional restrictions    Pre-Admission Information   Lives With: alone       (Dogsits daughter's dog)         Daughter plans to stay with pt after discharge.  Type of Home: house  Home Layout: two level, laundry in basement (full flight down with rail), stays on main level of home  Home Access:  1 step to enter without handrail from garage  Bathroom Layout: tub/shower unit  Bathroom Equipment: grab bars in shower, grab bars around toilet, shower chair, hand held shower head  Toilet Height: elevated height  Home Equipment: rolling walker, alert button  Transfer Assistance: Independent without use of device  Ambulation Assistance:Independent without use of device  ADL Assistance: independent with all ADL's - sponge bathes  IADL Assistance: independent with homemaking tasks (Dtr wants her to get assistance) - daughter has been assisting with laundry since previous admission as pt reports \"they don't want me doing stairs anymore\"  Active :        [x] Yes                 [] No - daytime  Hand Dominance: [] Left                 [] Right  Current Employment: retired.  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  Recent Falls: fell at the [facility]  Recent home therapy following previous admission in Feb, but states it has ended.       Examination   Vision:   Vision Gross Assessment: Impaired and Vision Corrective Device: wears glasses at all times  Hearing:   hard of hearing  Observation:   15L O2, purewick  Sensation:   denies numbness and tingling

## 2024-03-07 NOTE — PROGRESS NOTES
[facility]   Electrophysiology Progress Note     Date: 3/7/2024  Admit Date: 3/5/2024     Reason for consultation: Atrial fibrillation    Chief Complaint:   Chief Complaint   Patient presents with    Shortness of Breath     Came by [facility] from Dr. Hale's office d/t hypoxia in the 70s RA. 3 L baseline. A&O. No labored breathing. Recently d/c'd from hospital approx 2 weeks ago.        History of Present Illness: History obtained from patient and medical record.     Olive Mckeon is a 88 y.o. female with a past medical history of pulmonary fibrosis, dementia, and hypothyroidism.    Pt presented to the hospital with shortness of breath and hypoxemia. In emergency room she was found to have high proBNP and treated with Lasix.  She remained mildly tachycardic in the emergency room and CT scan showed new left lower lobe pulmonary embolism.    Interval Hx: Today, she is being seen for follow up. Her son is at the bedside. She is feeling much better. She converted back to sinus rhythm last evening.     Patient seen and examined. Clinical notes reviewed. Telemetry reviewed.  No new complaints today. No major events overnight.   Denies having chest pain, palpitations, shortness of breath, orthopnea/PND, cough, or dizziness at the time of this visit.    Allergies:  No Known Allergies    Home Meds:  Prior to Visit Medications    Medication Sig Taking? Authorizing Provider   vitamin D 25 MCG (1000 UT) CAPS Take 1 capsule by mouth daily Yes Provider, MD Jd   fluticasone (FLONASE) 50 MCG/ACT nasal spray 1 spray by Each Nostril route in the morning and at bedtime  Brittany Muñiz MD   sodium chloride (OCEAN, BABY AYR) 0.65 % nasal spray 1 spray by Nasal route as needed for Congestion  Patient not taking: Reported on 3/5/2024  Brittany Muñiz MD   donepezil (ARICEPT) 10 MG tablet Take 2 tablets by mouth daily  Patient not taking: Reported on 3/6/2024  Blane Hale MD   levothyroxine (SYNTHROID) 100 MCG

## 2024-03-07 NOTE — PROGRESS NOTES
03/07/24 1436   RT Protocol   History Pulmonary Disease 0   Respiratory pattern 0   Breath sounds 2   Cough 0   Indications for Bronchodilator Therapy Decreased or absent breath sounds   Bronchodilator Assessment Score 2

## 2024-03-07 NOTE — CARE COORDINATION
Case Management Assessment  Initial Evaluation    Date/Time of Evaluation: 3/7/2024 3:19 PM   Assessment Completed by: JAUN LE RN    If patient is discharged prior to next notation, then this note serves as note for discharge by case management.    Patient Name: Olive Mckeon                   YOB: 1935  Diagnosis: Acute respiratory failure (HCC) [J96.00]  Acute respiratory failure with hypoxia (HCC) [J96.01]  Increased oxygen demand [R68.89]                   Date / Time: 3/5/2024  4:43 PM    Patient Admission Status: Inpatient   Readmission Risk (Low < 19, Mod (19-27), High > 27): Readmission Risk Score: 15.1    Current PCP: Blane Hale MD  PCP verified by CM? Yes    Chart Reviewed: Yes      History Provided by: Patient, Child/Family  Patient Orientation: Alert and Oriented, Person, Place, Situation    Patient Cognition: Alert    Hospitalization in the last 30 days (Readmission):  Yes    If yes, Readmission Assessment in  Navigator will be completed.    Advance Directives:      Code Status: DNR-CCA   Patient's Primary Decision Maker is: Legal Next of Kin    Primary Decision Maker: Elva Vickers - Child - [phone]    Discharge Planning:    Patient lives with: Alone, Other (Comment) (daughter will be staying there at discharge) Type of Home: House, Other (Comment) (pt stays on main level)  Primary Care Giver: Self  Patient Support Systems include: Children, Family Members   Current Financial resources: Other (Comment) (FRAMED)  Current community resources: ECF/Home Care  Current services prior to admission: Durable Medical Equipment, Home Care, Oxygen Therapy (has concentrator and portable tank)            Current DME: Oxygen Therapy (Comment), Walker, Other (Comment) (3 liters, provided by [employer], alert button)            Type of Home Care services:  PT, OT, Nursing Services    ADLS  Prior functional level: Assistance with the following:, Other (see comment), Housework, Cooking  concentrator and sent it to [employer] (after first sending to wrong company) and she still has not heard about that.     PARISH called Jack with [employer] in house [phone] and he will check into the above and also states in order for pt to get a portable concentrator that she will also need another home oxygen evaluation before discharge to make sure she would qualify for portable and make sure she has equipment needed at home.     PARISH sent message to MD (listed as Janusz who responded and states he will forward to Dr Marie who is following).    Therapy recommending SNF. List given but pt states she will go home.     The Plan for Transition of Care is related to the following treatment goals of Acute respiratory failure (HCC) [J96.00]  Acute respiratory failure with hypoxia (HCC) [J96.01]  Increased oxygen demand [R68.89]    IF APPLICABLE: The Patient and/or patient representative Olive and her family were provided with a choice of provider and agrees with the discharge plan. Freedom of choice list with basic dialogue that supports the patient's individualized plan of care/goals and shares the quality data associated with the providers was provided to: Patient   Patient Representative Name:       The Patient and/or Patient Representative Agree with the Discharge Plan? No (does not want snf will go home with hc)    Kassidy Dubois RN, BSN  [phone]

## 2024-03-07 NOTE — PROGRESS NOTES
PULMONARY AND CRITICAL CARE MEDICINE PROGRESS NOTE    Subjective: Patient sitting in bed in no apparent respiratory distress.  Overnight did require higher flow of oxygen.  Currently on 15 L/min of oxygen supplementation saturating in mid 90s.  Reports feeling better.  Was seen to be in A-fib overnight.  Diuretics were stopped yesterday.    REVIEW OF SYSTEMS:     Upon review of system is negative except that mentioned in the subjective portion.    PAST MEDICAL HISTORY:   Past Medical History:   Diagnosis Date    Adhesive capsulitis of shoulder     Hypothyroidism     On home O2     Osteoporosis, unspecified     Routine general medical examination at a health care facility     Seborrheic dermatitis, unspecified     Unspecified disorder of skin and subcutaneous tissue        PAST SURGICAL HISTORY:   Past Surgical History:   Procedure Laterality Date    CATARACT REMOVAL      left    CATARACT REMOVAL      right    COLONOSCOPY      OTHER SURGICAL HISTORY      anal fissure       SOCIAL HISTORY:   Social History     Tobacco Use    Smoking status: Never    Smokeless tobacco: Never   Substance Use Topics    Alcohol use: Not Currently     Comment: occasional    Drug use: No       FAMILY HISTORY:   Family History   Problem Relation Age of Onset    Hypertension Sister     Diabetes Sister     Stroke Father 68    High Blood Pressure Father     Heart Disease Mother 80    Cancer Paternal Aunt         breast       MEDICATIONS:     No current facility-administered medications on file prior to encounter.     Current Outpatient Medications on File Prior to Encounter   Medication Sig Dispense Refill    vitamin D 25 MCG (1000 UT) CAPS Take 1 capsule by mouth daily      fluticasone (FLONASE) 50 MCG/ACT nasal spray 1 spray by Each Nostril route in the morning and at bedtime 16 g 1    sodium chloride (OCEAN, BABY AYR) 0.65 % nasal spray 1 spray by Nasal route as needed for Congestion (Patient not taking: Reported on 3/5/2024) 1 each 1  questions were answered to his satisfaction.    Total critical care time caring for this patient with life threatening illness, including direct patient contact, management of life support systems, review of data including imaging and labs, discussions with other team members and physicians is at least 32 minutes so far today, excluding procedures.        Randy Fuentes MD, [facility], Critical Care and Sleep Medicine  [address], [facility] [address], [place] [id number]  3/5/2024, 12:58 PM          This note was completed using dragon medical speech recognition software. Grammatical errors, random word insertions, pronoun errors and incomplete sentences are occasional consequences of this technology due to software limitations. If there are questions or concerns about the content of this note of information contained within the body of this dictation they should be addressed with the provider for clarification.

## 2024-03-07 NOTE — RT PROTOCOL NOTE
RT Nebulizer Bronchodilator Protocol Note    There is a bronchodilator order in the chart from a provider indicating to follow the RT Bronchodilator Protocol and there is an “Initiate RT Bronchodilator Protocol” order as well (see protocol at bottom of note).    CXR Findings:  XR CHEST PORTABLE    Result Date: 3/7/2024  Progressive bilateral interstitial infiltrates.     XR CHEST PORTABLE    Result Date: 3/5/2024  The mediastinum appears to widen which is new from the previous study.  This may be related to fluid overload       The findings from the last RT Protocol Assessment were as follows:  Smoking: None or smoker <15 pack years  Respiratory Pattern: Regular pattern and RR 12-20 bpm  Breath Sounds: Slightly diminished and/or crackles  Cough: Strong, spontaneous, non-productive  Indication for Bronchodilator Therapy: Decreased or absent breath sounds  Bronchodilator Assessment Score: 2    Aerosolized bronchodilator medication orders have been revised according to the RT Nebulizer Bronchodilator Protocol below.    Respiratory Therapist to perform RT Therapy Protocol Assessment initially then follow the protocol.  Repeat RT Therapy Protocol Assessment PRN for score 0-3 or on second treatment, BID, and PRN for scores above 3.    No Indications - adjust the frequency to every 6 hours PRN wheezing or bronchospasm, if no treatments needed after 48 hours then discontinue using Per Protocol order mode.     If indication present, adjust the RT bronchodilator orders based on the Bronchodilator Assessment Score as indicated below.  If a patient is on this medication at home then do not decrease Frequency below that used at home.    0-3 - enter or revise RT bronchodilator order(s) to equivalent RT Bronchodilator order with Frequency of every 4 hours PRN for wheezing or increased work of breathing using Per Protocol order mode.       4-6 - enter or revise RT Bronchodilator order(s) to two equivalent RT bronchodilator orders  with one order with BID Frequency and one order with Frequency of every 4 hours PRN wheezing or increased work of breathing using Per Protocol order mode.         7-10 - enter or revise RT Bronchodilator order(s) to two equivalent RT bronchodilator orders with one order with TID Frequency and one order with Frequency of every 4 hours PRN wheezing or increased work of breathing using Per Protocol order mode.       11-13 - enter or revise RT Bronchodilator order(s) to one equivalent RT bronchodilator order with QID Frequency and an Albuterol order with Frequency of every 4 hours PRN wheezing or increased work of breathing using Per Protocol order mode.      Greater than 13 - enter or revise RT Bronchodilator order(s) to one equivalent RT bronchodilator order with every 4 hours Frequency and an Albuterol order with Frequency of every 2 hours PRN wheezing or increased work of breathing using Per Protocol order mode.     RT to enter RT Home Evaluation for COPD & MDI Assessment order using Per Protocol order mode.    Electronically signed by VERNELL RAINEY RCP on 3/7/2024 at 2:37 PM

## 2024-03-08 LAB
ANION GAP SERPL CALCULATED.3IONS-SCNC: 10 MMOL/L (ref 3–16)
BUN SERPL-MCNC: 16 MG/DL (ref 7–20)
CALCIUM SERPL-MCNC: 8.8 MG/DL (ref 8.3–10.6)
CHLORIDE SERPL-SCNC: 93 MMOL/L (ref 99–110)
CO2 SERPL-SCNC: 26 MMOL/L (ref 21–32)
CREAT SERPL-MCNC: 0.6 MG/DL (ref 0.6–1.2)
DIGOXIN SERPL-MCNC: 1.3 NG/ML (ref 0.8–2)
EKG ATRIAL RATE: 141 BPM
EKG DIAGNOSIS: NORMAL
EKG Q-T INTERVAL: 338 MS
EKG QRS DURATION: 74 MS
EKG QTC CALCULATION (BAZETT): 457 MS
EKG R AXIS: -27 DEGREES
EKG T AXIS: 31 DEGREES
EKG VENTRICULAR RATE: 110 BPM
GFR SERPLBLD CREATININE-BSD FMLA CKD-EPI: >60 ML/MIN/{1.73_M2}
GLUCOSE SERPL-MCNC: 113 MG/DL (ref 70–99)
MAGNESIUM SERPL-MCNC: 2.2 MG/DL (ref 1.8–2.4)
NT-PROBNP SERPL-MCNC: 505 PG/ML (ref 0–449)
POTASSIUM SERPL-SCNC: 3.8 MMOL/L (ref 3.5–5.1)
SODIUM SERPL-SCNC: 129 MMOL/L (ref 136–145)

## 2024-03-08 PROCEDURE — 80162 ASSAY OF DIGOXIN TOTAL: CPT

## 2024-03-08 PROCEDURE — 94761 N-INVAS EAR/PLS OXIMETRY MLT: CPT

## 2024-03-08 PROCEDURE — 6370000000 HC RX 637 (ALT 250 FOR IP): Performed by: CLINICAL NURSE SPECIALIST

## 2024-03-08 PROCEDURE — 97116 GAIT TRAINING THERAPY: CPT

## 2024-03-08 PROCEDURE — 99291 CRITICAL CARE FIRST HOUR: CPT | Performed by: INTERNAL MEDICINE

## 2024-03-08 PROCEDURE — 97535 SELF CARE MNGMENT TRAINING: CPT

## 2024-03-08 PROCEDURE — 2700000000 HC OXYGEN THERAPY PER DAY

## 2024-03-08 PROCEDURE — 80048 BASIC METABOLIC PNL TOTAL CA: CPT

## 2024-03-08 PROCEDURE — 83880 ASSAY OF NATRIURETIC PEPTIDE: CPT

## 2024-03-08 PROCEDURE — 6360000002 HC RX W HCPCS: Performed by: INTERNAL MEDICINE

## 2024-03-08 PROCEDURE — 6370000000 HC RX 637 (ALT 250 FOR IP): Performed by: STUDENT IN AN ORGANIZED HEALTH CARE EDUCATION/TRAINING PROGRAM

## 2024-03-08 PROCEDURE — 99232 SBSQ HOSP IP/OBS MODERATE 35: CPT | Performed by: NURSE PRACTITIONER

## 2024-03-08 PROCEDURE — 93010 ELECTROCARDIOGRAM REPORT: CPT | Performed by: INTERNAL MEDICINE

## 2024-03-08 PROCEDURE — 99233 SBSQ HOSP IP/OBS HIGH 50: CPT | Performed by: CLINICAL NURSE SPECIALIST

## 2024-03-08 PROCEDURE — 97530 THERAPEUTIC ACTIVITIES: CPT

## 2024-03-08 PROCEDURE — 97110 THERAPEUTIC EXERCISES: CPT

## 2024-03-08 PROCEDURE — 1200000000 HC SEMI PRIVATE

## 2024-03-08 PROCEDURE — 94150 VITAL CAPACITY TEST: CPT

## 2024-03-08 PROCEDURE — 36415 COLL VENOUS BLD VENIPUNCTURE: CPT

## 2024-03-08 PROCEDURE — 83735 ASSAY OF MAGNESIUM: CPT

## 2024-03-08 PROCEDURE — 2580000003 HC RX 258: Performed by: INTERNAL MEDICINE

## 2024-03-08 PROCEDURE — 6370000000 HC RX 637 (ALT 250 FOR IP): Performed by: INTERNAL MEDICINE

## 2024-03-08 RX ORDER — DIGOXIN 125 MCG
125 TABLET ORAL DAILY
Status: DISCONTINUED | OUTPATIENT
Start: 2024-03-09 | End: 2024-03-20 | Stop reason: HOSPADM

## 2024-03-08 RX ORDER — SPIRONOLACTONE 25 MG/1
25 TABLET ORAL
Status: DISCONTINUED | OUTPATIENT
Start: 2024-03-08 | End: 2024-03-14

## 2024-03-08 RX ORDER — SPIRONOLACTONE 25 MG/1
25 TABLET ORAL
Status: DISCONTINUED | OUTPATIENT
Start: 2024-03-08 | End: 2024-03-08

## 2024-03-08 RX ADMIN — APIXABAN 10 MG: 5 TABLET, FILM COATED ORAL at 16:02

## 2024-03-08 RX ADMIN — Medication 10 ML: at 09:09

## 2024-03-08 RX ADMIN — METOPROLOL TARTRATE 12.5 MG: 25 TABLET, FILM COATED ORAL at 21:19

## 2024-03-08 RX ADMIN — METOPROLOL TARTRATE 12.5 MG: 25 TABLET, FILM COATED ORAL at 09:08

## 2024-03-08 RX ADMIN — Medication 10 ML: at 21:19

## 2024-03-08 RX ADMIN — DONEPEZIL HYDROCHLORIDE 20 MG: 5 TABLET, FILM COATED ORAL at 21:20

## 2024-03-08 RX ADMIN — SPIRONOLACTONE 25 MG: 25 TABLET ORAL at 09:28

## 2024-03-08 RX ADMIN — DIGOXIN 250 MCG: 0.25 INJECTION INTRAMUSCULAR; INTRAVENOUS at 09:09

## 2024-03-08 RX ADMIN — APIXABAN 10 MG: 5 TABLET, FILM COATED ORAL at 21:20

## 2024-03-08 RX ADMIN — ASPIRIN 81 MG 81 MG: 81 TABLET ORAL at 09:09

## 2024-03-08 RX ADMIN — CHOLECALCIFEROL TAB 25 MCG (1000 UNIT) 1000 UNITS: 25 TAB at 09:08

## 2024-03-08 RX ADMIN — LEVOTHYROXINE SODIUM 100 MCG: 0.1 TABLET ORAL at 09:08

## 2024-03-08 RX ADMIN — ENOXAPARIN SODIUM 70 MG: 100 INJECTION SUBCUTANEOUS at 09:09

## 2024-03-08 ASSESSMENT — PAIN SCALES - GENERAL
PAINLEVEL_OUTOF10: 0

## 2024-03-08 NOTE — PROGRESS NOTES
PULMONARY AND CRITICAL CARE MEDICINE PROGRESS NOTE    Subjective: Patient lying  in bed in no apparent respiratory distress.  Oxygen requirements continue to fluctuate.  Patient herself is feeling better.   Was seen to be in A-fib overnight.      REVIEW OF SYSTEMS:     Upon review of system is negative except that mentioned in the subjective portion.    PAST MEDICAL HISTORY:   Past Medical History:   Diagnosis Date    Adhesive capsulitis of shoulder     Hypothyroidism     On home O2     Osteoporosis, unspecified     Routine general medical examination at a health care facility     Seborrheic dermatitis, unspecified     Unspecified disorder of skin and subcutaneous tissue        PAST SURGICAL HISTORY:   Past Surgical History:   Procedure Laterality Date    CATARACT REMOVAL      left    CATARACT REMOVAL      right    COLONOSCOPY      OTHER SURGICAL HISTORY      anal fissure       SOCIAL HISTORY:   Social History     Tobacco Use    Smoking status: Never    Smokeless tobacco: Never   Substance Use Topics    Alcohol use: Not Currently     Comment: occasional    Drug use: No       FAMILY HISTORY:   Family History   Problem Relation Age of Onset    Hypertension Sister     Diabetes Sister     Stroke Father 68    High Blood Pressure Father     Heart Disease Mother 80    Cancer Paternal Aunt         breast       MEDICATIONS:     No current facility-administered medications on file prior to encounter.     Current Outpatient Medications on File Prior to Encounter   Medication Sig Dispense Refill    vitamin D 25 MCG (1000 UT) CAPS Take 1 capsule by mouth daily      fluticasone (FLONASE) 50 MCG/ACT nasal spray 1 spray by Each Nostril route in the morning and at bedtime 16 g 1    sodium chloride (OCEAN, BABY AYR) 0.65 % nasal spray 1 spray by Nasal route as needed for Congestion (Patient not taking: Reported on 3/5/2024) 1 each 1    donepezil (ARICEPT) 10 MG tablet Take 2 tablets by mouth daily (Patient not taking: Reported  on 3/6/2024) 180 tablet 1    levothyroxine (SYNTHROID) 100 MCG tablet Take 1 tablet by mouth daily 90 tablet 1    estradiol (ESTRACE) 0.1 MG/GM vaginal cream Place 2 g vaginally daily as needed      aspirin 81 MG tablet Take 1 tablet by mouth daily      docusate sodium (COLACE) 100 MG capsule Take 1 capsule by mouth 2 times daily as needed for Constipation      Ibuprofen-diphenhydrAMINE HCl 200-25 MG CAPS Take 1 tablet by mouth nightly           [START ON 3/9/2024] digoxin  125 mcg Oral Daily    spironolactone  25 mg Oral Once per day on Mon Wed Fri    enoxaparin  1 mg/kg SubCUTAneous BID    metoprolol tartrate  12.5 mg Oral BID    sodium chloride flush  5-40 mL IntraVENous 2 times per day    aspirin  81 mg Oral Daily    donepezil  20 mg Oral Nightly    levothyroxine  100 mcg Oral Daily    Vitamin D  1,000 Units Oral Daily      sodium chloride 10 mL/hr at 03/06/24 0201     perflutren lipid microspheres, albuterol, sodium chloride flush, sodium chloride, potassium chloride **OR** potassium alternative oral replacement **OR** potassium chloride, magnesium sulfate, ondansetron **OR** ondansetron, polyethylene glycol, acetaminophen **OR** acetaminophen, docusate sodium, ipratropium 0.5 mg-albuterol 2.5 mg      ALLERGIES:   Allergies as of 03/05/2024    (No Known Allergies)      OBJECTIVE:   height is 1.727 m (5' 8\") and weight is 65.6 kg (144 lb 11.2 oz). Her temporal temperature is 98.5 °F (36.9 °C). Her blood pressure is 114/49 (abnormal) and her pulse is 87. Her respiration is 20 and oxygen saturation is 94%.   I/O this shift:  In: 240 [P.O.:240]  Out: -      PHYSICAL EXAM:    CONSTITUTIONAL: She is a 88 y.o.-year-old who appears her stated age. She is alert and oriented x 3 and in no acute distress.   HEENT: PERRLA, EOMI. No scleral icterus. No thrush, atraumatic, normocephalic. No oropharyngeal erythema seen.   NECK: Supple, without cervical or supraclavicular lymphadenopathy. No neck rigidity seen.

## 2024-03-08 NOTE — DISCHARGE INSTRUCTIONS
Guidelines for Heart Failure home management:    1. Continue to monitor weight first thing each morning. You should weigh yourself after using the bathroom and before you eat breakfast.    2. Report to your doctor any significant weight change. Remember that weight change of 2-3 lbs. in 1 day or 5 lbs in a week is \"significant\" and likely represents changes in \"fluid\" status.  If you are experiencing any swelling in your feet, ankles or abdomen, or shortness of breath, call your doctor.     3. You should restrict all sodium intake to 3000 milligrams (3 grams) a day. Depending on your status, you may also be asked to restrict fluid intake to no more that 64 oz/2 Liters a day. If uncertain, ask the nurse or physician.     4. Regular aerobic exercise is encouraged 30 minutes a day (walking, bike, swimming, etc.). For specific exercise recommendations, ask your physician.     5. Report to your doctor any change in symptoms (chest pain, worsening shortness of breath, increased dizziness or passing out, increased palpitations or ICD shock, trouble catching breath while lying down, increased edema or abdominal bloating). Remember that even \"minor\" changes in symptoms may be important. Also report any changes in medications including \"over the counter\" medications.     6. DO NOT take NSAID's for pain (i.e, Advil, Aleve, Motrin, ibuprofen, and many more) since these may cause serious problems in those with a history of CHF. If uncertain about the medication, call your doctor.    7. If you have new significant or ongoing diarrhea or vomiting, please call your doctor for further instructions. Taking a diuretic (water pill) with these symptoms can worsen dehydration.     8. If you have any questions or concerns you can always call the CHF  Resource Line( [phone].  It is available Monday thru Friday 8 am- 5 pm. Please leave a message and your call will be returned shortly.     Extra Heart Failure

## 2024-03-08 NOTE — PROGRESS NOTES
The Shanks Sleepiness Scale       The Shanks Sleepiness Scale is widely used in the field of sleep medicine as a subjective measure of a patient's sleepiness. The test is a list of eight situations in which you rate your tendency to become sleepy on a scale of 0, no chance to 3, high chance of dozing. Your score is based on a scale of 0 to 24. The scale estimates whether you are experiencing excessive sleepiness that possibly requires medical attention.     How Sleepy Are You?  How sleepy are you to doze off or fall asleep in the following situations? You should rate your chances of dozing off, not just feeling tired. Even if you have not done some of these things recently try to determine how they would have affected you. For each situation, decide whether or not you would have:     0 = No chance of dozing 1 = Slight chance of dozing   2 = Moderate chance of  dozing 3 = High change of dozing       Situation                                                                                     Chance of Dozing    Sitting and reading  0 =  [x]  1 =    [] 2 =    [] 3 =    []    Watching TV  0 =  [x]  1 =    [] 2 =    [] 3 =    []      Sitting inactive in public place (e.g., a theater or a meeting)  0 =  [x]  1 =    [] 2 =    [] 3 =    []    As a passenger in a car for an hour without a break          0  =  [x]  1 =    [] 2 =    [] 3 =    []    Lying down to rest in the afternoon when circumstances permit    0 =  []  1 =    [x] 2 =    [] 3 =    []    Sitting and talking to someone  0 =  [x]  1 =    [] 2 =    [] 3 =    []      Sitting quietly after a lunch without alcohol  0 =  [x]  1 =    [] 2 =    [] 3 =    []    In a car, while stopped for a few minutes in traffic                                                                      0 =  [x]  1 =    [] 2 =    [] 3 =    []    Total Score = 1    If your total score is 10 or greater, you are experiencing excessive sleepiness and should consider seeking a medical  follow-up. Take a copy of this screening test to your primary care physician on your next office visit.      Interpretation:      0 -   7: It is unlikely that you are abnormally sleepy.    8 -   9: You have an average amount of daytime sleepiness.  10 - 15: You may be excessively sleepy depending on the situation. You may want to consider seeking medical attention.   16 - 24: You are excessively sleepy and should consider seeking medical attention.      Electronically signed by Xena Jones RCP on 3/8/2024 at 5:51 PM

## 2024-03-08 NOTE — PROGRESS NOTES
[facility] - Inpatient Rehabilitation Department   Phone: [phone]    Physical Therapy    [] Initial Evaluation            [x] Daily Treatment Note         [] Discharge Summary      Patient: Olive Mckeon   : 1935   MRN: [id number]   Date of Service:  3/8/2024  Admitting Diagnosis: Acute respiratory failure (HCC)  Current Admission Summary: She presented to the hospital in feb for pneumonia/covid related pulmonary fibrosis and discharged on 2 L of oxygen. She is now having increased shortness of breath and found to have sats in the 70s. Bnp 1408 she received some IV lasix. CT chest shows new LLL pulmonary embolism. Echo with normal LVEF and grade 1 diastolic dysfunction. She went into AF with RVR. Dopplers show acute LLE DVT with plan to switch Lovenox to therapeutic dose Lovenox    3/6 -  CT of chest   - Nonobstructive left lower lobe pulmonary embolus.     -Increased patchy diffuse ground-glass opacity and interstitial prominence on a background of chronic interstitial disease and possible emphysema.  Findings most likely represent pulmonary edema.  Diffuse atypical/viral pneumonia is not excluded.  Past Medical History:  has a past medical history of Adhesive capsulitis of shoulder, Hypothyroidism, On home O2, Osteoporosis, unspecified, Routine general medical examination at a health care facility, Seborrheic dermatitis, unspecified, and Unspecified disorder of skin and subcutaneous tissue.  Past Surgical History:  has a past surgical history that includes Cataract removal; Cataract removal; other surgical history; and Colonoscopy.  Discharge Recommendations: Olive Mckeon scored a 15/24 on the AM-PAC short mobility form. Current research shows that an AM-PAC score of 17 or less is typically not associated with a discharge to the patient's home setting. Based on the patient's AM-PAC score and their current functional mobility deficits, it is recommended that the patient have 3-5 sessions  Outcomes  AM-PAC Inpatient Mobility Raw Score : 15              Cognition  WFL  Memory: decreased short term memory  Insights: decreased awareness of deficits  Orientation:    alert and oriented x 4  Command Following:   WFL    Education  Barriers To Learning: none  Patient Education: patient educated on goals, PT role and benefits, general safety, functional mobility training, disease specific education, transfer training, discharge recommendations  Learning Assessment:  patient verbalizes and demonstrates understanding    Assessment  Activity Tolerance: poor - SOB with minimal activity, desats to 75% with 10' of ambulation and 15L HFNC, significant increased time to recover to the 90s.  Able to wean to 12L achieving 91% sats at rest.  RN notified  Impairments Requiring Therapeutic Intervention: decreased functional mobility, decreased strength, decreased safety awareness, decreased endurance, decreased balance  Prognosis: good  Clinical Assessment: Patient not at baseline function and would benefit from skilled PT to address above deficits and facilitate return to baseline function. Patient typically active and independent at home. Currently tolerating minimal activity prior to SOB/decreased SpO2 de to PE and pulmonary fibrosis.  Not able to ambulate functional distances this date.     Safety Interventions: patient left in chair, chair alarm in place, call light within reach, patient at risk for falls, nurse notified, and family/caregiver present (grandaughter and son)    Plan  Frequency: 3-5 x/per week  Current Treatment Recommendations: strengthening, balance training, functional mobility training, transfer training, gait training, stair training, endurance training, home exercise program, and safety education    Goals  Patient Goals: to go home   Short Term Goals:  Time Frame: discharge  Patient will complete bed mobility at supervision   Patient will complete transfers at supervision   Patient will ambulate 25

## 2024-03-08 NOTE — PROGRESS NOTES
[facility]   Electrophysiology Progress Note     Date: 3/8/2024  Admit Date: 3/5/2024     Reason for consultation: Atrial fibrillation    Chief Complaint:   Chief Complaint   Patient presents with    Shortness of Breath     Came by [facility] from Dr. Hale's office d/t hypoxia in the 70s RA. 3 L baseline. A&O. No labored breathing. Recently d/c'd from hospital approx 2 weeks ago.        History of Present Illness: History obtained from patient and medical record.     Olive Mckeon is a 88 y.o. female with a past medical history of pulmonary fibrosis, dementia, and hypothyroidism.    Pt presented to the hospital with shortness of breath and hypoxemia. In emergency room she was found to have high proBNP and treated with Lasix.  She remained mildly tachycardic in the emergency room and CT scan showed new left lower lobe pulmonary embolism.    Interval Hx: Today, she is being seen for EP follow up. Her son is at the bedside. She is doing fair; remains on 10L HF oxygen. She continues in sinus rhythm with stable. No further AF    Patient seen and examined. Clinical notes reviewed. Telemetry reviewed.  No new complaints today. No major events overnight.   Denies having chest pain, palpitations, shortness of breath, orthopnea/PND, cough, or dizziness at the time of this visit.    Allergies:  No Known Allergies    Home Meds:  Prior to Visit Medications    Medication Sig Taking? Authorizing Provider   vitamin D 25 MCG (1000 UT) CAPS Take 1 capsule by mouth daily Yes Provider, MD Jd   fluticasone (FLONASE) 50 MCG/ACT nasal spray 1 spray by Each Nostril route in the morning and at bedtime  Brittany Muñiz MD   sodium chloride (OCEAN, BABY AYR) 0.65 % nasal spray 1 spray by Nasal route as needed for Congestion  Patient not taking: Reported on 3/5/2024  Brittany Muñiz MD   donepezil (ARICEPT) 10 MG tablet Take 2 tablets by mouth daily  Patient not taking: Reported on 3/6/2024  Blane Hale MD

## 2024-03-08 NOTE — CARE COORDINATION
PARISH called Altagracia with [facility] to see if pt may qualify with fluctuations in oxygen needs.    She states with [employer] insurance can only take pt's that are on vent, new trach or HD.    CM met with pt and daughter and two other family members in room and discussed they above and LTAC such as [facility] or SNF's that could provided for increased oxygen needs.    Pt will consider but is still really wanting to go home. Daughter again states she will be staying with patient when she discharges.     Cm will continue to follow for pt progress and plan.     Kassidy Dubois RN, BSN  [phone]

## 2024-03-08 NOTE — PROGRESS NOTES
[facility] - Inpatient Rehabilitation Department   Phone: [phone]    Occupational Therapy    [] Initial Evaluation            [x] Daily Treatment Note         [] Discharge Summary      Patient: Olive Mckeon   : 1935   MRN: [id number]   Date of Service:  3/8/2024    Admitting Diagnosis:  Acute respiratory failure (HCC)  Current Admission Summary: She presented to the hospital in feb for pneumonia/covid related pulmonary fibrosis and discharged on 2 L of oxygen. She is now having increased shortness of breath and found to have sats in the 70s. Bnp 1408 she received some IV lasix. CT chest shows new LLL pulmonary embolism. Echo with normal LVEF and grade 1 diastolic dysfunction. She went into AF with RVR. Dopplers show acute LLE DVT with plan to switch Lovenox to therapeutic dose Lovenox      Past Medical History:  has a past medical history of Adhesive capsulitis of shoulder, Hypothyroidism, On home O2, Osteoporosis, unspecified, Routine general medical examination at a health care facility, Seborrheic dermatitis, unspecified, and Unspecified disorder of skin and subcutaneous tissue.  Past Surgical History:  has a past surgical history that includes Cataract removal; Cataract removal; other surgical history; and Colonoscopy.    Discharge Recommendations: Olive Mckeon scored a 16/24 on the AM-PAC ADL Inpatient form. Current research shows that an AM-PAC score of 17 or less is typically not associated with a discharge to the patient's home setting. Based on the patient's AM-PAC score and their current ADL deficits, it is recommended that the patient have 3-5 sessions per week of Occupational Therapy at d/c to increase the patient's independence.  Please see assessment section for further patient specific details.    If patient discharges prior to next session this note will serve as a discharge summary.  Please see below for the latest assessment towards goals.       DME Required For Discharge:  DME to be determined at next level of care, DME to be determined pending patient progress    Precautions/Restrictions: high fall risk  Weight Bearing Restrictions: no restrictions  [] Right Upper Extremity  [] Left Upper Extremity [] Right Lower Extremity  [] Left Lower Extremity     Required Braces/Orthotics: no braces required   [] Right  [] Left  Positional Restrictions:no positional restrictions    Pre-Admission Information   Lives With: alone       (Dogsits daughter's dog)         Daughter plans to stay with pt after discharge.  Type of Home: house  Home Layout: two level, laundry in basement (full flight down with rail), stays on main level of home  Home Access:  1 step to enter without handrail from garage  Bathroom Layout: tub/shower unit  Bathroom Equipment: grab bars in shower, grab bars around toilet, shower chair, hand held shower head  Toilet Height: elevated height  Home Equipment: rolling walker, alert button  Transfer Assistance: Independent without use of device  Ambulation Assistance:Independent without use of device  ADL Assistance: independent with all ADL's - sponge bathes  IADL Assistance: independent with homemaking tasks (Dtr wants her to get assistance) - daughter has been assisting with laundry since previous admission as pt reports \"they don't want me doing stairs anymore\"  Active :        [x] Yes                 [] No - daytime  Hand Dominance: [] Left                 [] Right  Current Employment: retired.  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  Recent Falls: fell at the [facility]  Recent home therapy following previous admission in Feb, but states it has ended.       Examination   Vision:   Vision Gross Assessment: Impaired and Vision Corrective Device: wears glasses at all times  Hearing:   hard of hearing  Observation:   15L O2 with activity, 10L at rest - purewick  Sensation:

## 2024-03-08 NOTE — PROGRESS NOTES
Occupational Therapy/Physical Therapy  Olive Mckeon RN requesting to hold OT secondary to low SpO2 when attempting to wean supplemental O2. Will hold at this time and follow up for assessment as medically appropriate to participate.    Thank you,  Srinivas Gayle, OT

## 2024-03-08 NOTE — PROGRESS NOTES
Date of Admission: 3/5/2024. Hospital Day: 4   88 y.o. female with a pmh of hypothyroidism mild dementia who presents with Acute respiratory failure , admitted for chf and  acute pe.  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]     Increased oxygen demand [R68.89]     Elevated brain natriuretic peptide (BNP) level [R79.89]     Acute pulmonary embolism ([facility]) [I26.99]     PAF (paroxysmal atrial fibrillation) ([facility]) [I48.0]     Atrial fibrillation with RVR ([facility]) [I48.91]     Acute respiratory failure ([facility]) [J96.00]    Acute on chronic hypoxic respiratory failure/ acute pulmonary embolism/ acute chf/pulm fibrosis  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 and acute CHF ppted by  RVR  Oxygen requirement fluctuating from 5 to 10 L  Echocardiogram unremarkable, no evidence of right heart strain  XR chest 3/7 revealed progressive bilateral opacities  Start low-dose IV Lasix as tolerated  Strict TONY, monitor BMP and mag  Cardiology consulted  Recent admission for suspected pulmonary fibrosis consult pulm, defer steroid for now    A-fib RVR  Episodes of RVR last night, currently normal sinus rhythm with PVCs  Continue digoxin and metoprolol  Not a candidate for amiodarone or ablation      DVT  Venous Doppler showing DVT in left popliteal and left posterior tibial vein  Switch Lovenox to Eliquis    Hyponatremia  Sodium 129, could be secondary to diuresis  Lasix stopped, switched to Aldactone by cardiology      Hypothyroidism  Continue home Synthroid    dementia, mild      DVT Prophylaxis:    Diet: ADULT DIET; Regular; No Added Salt  (3-4 gm); 1500 ml  Code Status: DNR-CCA      Dispo - home    All  follow up labs and imaging personally reviewed      Chief Complaint:   Chief Complaint   Patient presents with    Shortness of Breath     Came by [facility] from Dr. Hale's office d/t hypoxia in the 70s RA. 3 L baseline. A&O. No labored breathing. Recently d/c'd from hospital approx 2 weeks ago.          Interval  History:   Reports feeling better, oxygen requirement continues to fluctuate from 5 L at rest to 10 L with exertion.   Medications:  Reviewed    Infusion Medications    sodium chloride 10 mL/hr at 03/06/24 0201     Scheduled Medications    [START ON 3/9/2024] digoxin  125 mcg Oral Daily    spironolactone  25 mg Oral Once per day on Mon Wed Fri    apixaban  10 mg Oral BID    Followed by    [START ON 3/15/2024] apixaban  5 mg Oral BID    metoprolol tartrate  12.5 mg Oral BID    sodium chloride flush  5-40 mL IntraVENous 2 times per day    aspirin  81 mg Oral Daily    donepezil  20 mg Oral Nightly    levothyroxine  100 mcg Oral Daily    Vitamin D  1,000 Units Oral Daily     PRN Meds: perflutren lipid microspheres, albuterol, sodium chloride flush, sodium chloride, potassium chloride **OR** potassium alternative oral replacement **OR** potassium chloride, magnesium sulfate, ondansetron **OR** ondansetron, polyethylene glycol, acetaminophen **OR** acetaminophen, docusate sodium, ipratropium 0.5 mg-albuterol 2.5 mg      Intake/Output Summary (Last 24 hours) at 3/8/2024 1426  Last data filed at 3/8/2024 0952  Gross per 24 hour   Intake 730 ml   Output 500 ml   Net 230 ml       Physical Exam Performed:    BP (!) 114/49   Pulse 87   Temp 98.5 °F (36.9 °C) (Temporal)   Resp 20   Ht 1.727 m (5' 8\")   Wt 65.6 kg (144 lb 11.2 oz)   SpO2 94%   BMI 22.00 kg/m²     General: NAD  Eyes: EOMI  ENT: neck supple  Cardiovascular: Regular rate, rhythm  Respiratory: bilateral fine crackles   Gastrointestinal: Soft, non tender  Genitourinary: no suprapubic

## 2024-03-08 NOTE — PROGRESS NOTES
[facility] [place]   Daily Progress Note      Admit Date:  3/5/2024    HPI:    Ms. Mckeon is an 88 year old female with history of mild dementia, hypothyroidism, lung disease    She presented to the hospital in feb for pneumonia/covid related pulmonary fibrosis and discharged on 2 L of oxygen.  She is now having increased shortness of breath and found to have sats in the 70s.  Bnp 1408 she received some IV lasix.  CT chest shows new LLL pulmonary embolism.  Echo with normal LVEF and grade 1 diastolic dysfunction.  She went into AF with RVR      Subjective:  Patient is being seen for chronic diastolic heart failure. There were no acute overnight cardiac events. BP better and still in NSR.  Her oxygen is down to 5 L of oxygen.  She is asking when she is going to go home.  Her son, Ramo is at her side.  She lives by herself at home.  She cooks her own meals.  She is asking really good questions today  Bnp 1408-505  Tsh is normal sodium 133-129     Objective:   BP (!) 115/58   Pulse 88   Temp 98.2 °F (36.8 °C) (Temporal)   Resp 19   Ht 1.727 m (5' 8\")   Wt 65.6 kg (144 lb 11.2 oz)   SpO2 92%   BMI 22.00 kg/m²     Intake/Output Summary (Last 24 hours) at 3/8/2024 0842  Last data filed at 3/8/2024 0500  Gross per 24 hour   Intake 490 ml   Output 500 ml   Net -10 ml          Physical Exam:  General:  Awake, alert, oriented in NAD  Skin:  Warm and dry.  No unusual bruising or rash  Neck:  Supple.  No JVD or carotid bruit appreciated  Chest:  Normal effort.  Rales in bilateral bases  Cardiovascular:  RRR, S1/S2, no murmur/gallop/rub  Abdomen:  Soft, nontender, +bowel sounds  Extremities:  No edema  Neurological: No focal deficits  Psychological: Normal mood and affect      Medications:    furosemide  20 mg IntraVENous Daily    enoxaparin  1 mg/kg SubCUTAneous BID    metoprolol tartrate  12.5 mg Oral BID    digoxin  250 mcg IntraVENous Daily    sodium chloride flush  5-40 mL IntraVENous 2 times per day

## 2024-03-08 NOTE — CONSULTS
[facility]  HEART FAILURE PROGRAM      Olive Mckeon 1935    History:  Past Medical History:   Diagnosis Date    Adhesive capsulitis of shoulder     Hypothyroidism     On home O2     Osteoporosis, unspecified     Routine general medical examination at a health care facility     Seborrheic dermatitis, unspecified     Unspecified disorder of skin and subcutaneous tissue        ECHO:  3/6/24  Summary   Mild concentric left ventricular hypertrophy. Normal left ventricular cavity   size and systolic function with an estimated ejection fraction of 60-65%. No   evident regional wall motion abnormalities seen.   Grade I diastolic dysfunction with normal LV filling pressures.   The right ventricle is normal in size and systolic function.   Mild tricuspid regurgitation with an estimated RVSP of 47 mmHg    ACE/ARB/ARNi:   BB: lopressor 12.5 mg bid  Aldosterone Antagonist:   SGLT2:     History of sleep apnea: No      [place] Screen ordered: Yes    DM History: No      Last Hospital Admission: 2/122/17 with respiratory failure  Code Status: dnr cca  Discharge plans: patient active with [facility]    Family Present: yes, son    Olive Mckeon was admitted to the hospital with increased shortness of breath. Patient with new atrial fib and HFpEF. Patient does have a scale at home. She does not normally weight herself. Unsure of baseline. Discussed establishing new weight after discharge. Patient was experiencing lower leg edema and dyspnea. Patient was not following any type of sodium or fluid limit prior to admission. She has a mediset but states she was not on many medications prior to admission. Patient's daughter helps transports her to appointments.     Patient provided with both written and verbal education on CHF signs/ symptoms, causes, discharge medications, daily weights, low sodium diet, activity, and follow-up.  Pt to call if gains 3 pounds in one day or 5 pounds in one week. Mutually agreed  upon goals were discussed such as calling the MD as soon as they recognize symptoms and weight gain, maintaining proper diet, taking medications as prescribed, joining cardiac rehab when able. Also reviewed importance of risk factor reduction. Patient provided with CHF Zone Management tool and CHF symptoms magnet.    Discussed importance of lifestyle changes: discussed weighing daily and monitoring for signs of overload    PATIENT/CAREGIVER TEACHING:    Level of patient/caregiver understanding able to:   [x ] Verbalize understanding [ ] Demonstrate understanding [ ] Teach back   [ ] Needs reinforcement [ ] Other:       Time spent teachin mins    1. WEIGHT: Admit Weight - Scale: 63.5 kg (140 lb)      Today  Weight - Scale: 65.6 kg (144 lb 11.2 oz)   2. I/O   Intake/Output Summary (Last 24 hours) at 3/8/2024 0856  Last data filed at 3/8/2024 0500  Gross per 24 hour   Intake 490 ml   Output 500 ml   Net -10 ml       Recommendations:   1. Patient will need a one week or less hospital follow up scheduled before discharge.   2. Educate further on fluid restriction 48 oz- 64 oz during inpatient stay so they can understand how to measure intake at home.   3. Continue to educate on S/S.   4. Emphasize daily weights, diet, and knowing when and who to call  5. Provided patient with CHF Resource Line for questions and concerns.  6.HF pathway on kaci for discharge with home health       RUSSEL MENDOZA RN 3/8/2024 8:56 AM

## 2024-03-09 ENCOUNTER — APPOINTMENT (OUTPATIENT)
Dept: GENERAL RADIOLOGY | Age: 89
DRG: 189 | End: 2024-03-09
Payer: COMMERCIAL

## 2024-03-09 LAB
ANION GAP SERPL CALCULATED.3IONS-SCNC: 8 MMOL/L (ref 3–16)
BUN SERPL-MCNC: 13 MG/DL (ref 7–20)
CALCIUM SERPL-MCNC: 8.8 MG/DL (ref 8.3–10.6)
CHLORIDE SERPL-SCNC: 95 MMOL/L (ref 99–110)
CO2 SERPL-SCNC: 26 MMOL/L (ref 21–32)
CREAT SERPL-MCNC: 0.5 MG/DL (ref 0.6–1.2)
GFR SERPLBLD CREATININE-BSD FMLA CKD-EPI: >60 ML/MIN/{1.73_M2}
GLUCOSE SERPL-MCNC: 130 MG/DL (ref 70–99)
MAGNESIUM SERPL-MCNC: 2.1 MG/DL (ref 1.8–2.4)
NT-PROBNP SERPL-MCNC: 608 PG/ML (ref 0–449)
POTASSIUM SERPL-SCNC: 3.9 MMOL/L (ref 3.5–5.1)
SODIUM SERPL-SCNC: 129 MMOL/L (ref 136–145)

## 2024-03-09 PROCEDURE — 83735 ASSAY OF MAGNESIUM: CPT

## 2024-03-09 PROCEDURE — 6370000000 HC RX 637 (ALT 250 FOR IP): Performed by: INTERNAL MEDICINE

## 2024-03-09 PROCEDURE — 80048 BASIC METABOLIC PNL TOTAL CA: CPT

## 2024-03-09 PROCEDURE — 1200000000 HC SEMI PRIVATE

## 2024-03-09 PROCEDURE — 2700000000 HC OXYGEN THERAPY PER DAY

## 2024-03-09 PROCEDURE — 6360000002 HC RX W HCPCS: Performed by: INTERNAL MEDICINE

## 2024-03-09 PROCEDURE — 83880 ASSAY OF NATRIURETIC PEPTIDE: CPT

## 2024-03-09 PROCEDURE — 99232 SBSQ HOSP IP/OBS MODERATE 35: CPT | Performed by: NURSE PRACTITIONER

## 2024-03-09 PROCEDURE — 2580000003 HC RX 258: Performed by: INTERNAL MEDICINE

## 2024-03-09 PROCEDURE — 71045 X-RAY EXAM CHEST 1 VIEW: CPT

## 2024-03-09 PROCEDURE — 94761 N-INVAS EAR/PLS OXIMETRY MLT: CPT

## 2024-03-09 PROCEDURE — 6370000000 HC RX 637 (ALT 250 FOR IP): Performed by: STUDENT IN AN ORGANIZED HEALTH CARE EDUCATION/TRAINING PROGRAM

## 2024-03-09 PROCEDURE — 6370000000 HC RX 637 (ALT 250 FOR IP): Performed by: NURSE PRACTITIONER

## 2024-03-09 PROCEDURE — 99291 CRITICAL CARE FIRST HOUR: CPT | Performed by: INTERNAL MEDICINE

## 2024-03-09 RX ORDER — FUROSEMIDE 10 MG/ML
20 INJECTION INTRAMUSCULAR; INTRAVENOUS ONCE
Status: COMPLETED | OUTPATIENT
Start: 2024-03-09 | End: 2024-03-09

## 2024-03-09 RX ADMIN — DOCUSATE SODIUM 100 MG: 100 CAPSULE, LIQUID FILLED ORAL at 21:07

## 2024-03-09 RX ADMIN — APIXABAN 10 MG: 5 TABLET, FILM COATED ORAL at 10:21

## 2024-03-09 RX ADMIN — Medication 10 ML: at 22:24

## 2024-03-09 RX ADMIN — ASPIRIN 81 MG 81 MG: 81 TABLET ORAL at 10:22

## 2024-03-09 RX ADMIN — Medication 10 ML: at 10:26

## 2024-03-09 RX ADMIN — FUROSEMIDE 20 MG: 10 INJECTION, SOLUTION INTRAMUSCULAR; INTRAVENOUS at 10:21

## 2024-03-09 RX ADMIN — LEVOTHYROXINE SODIUM 100 MCG: 0.1 TABLET ORAL at 10:21

## 2024-03-09 RX ADMIN — APIXABAN 10 MG: 5 TABLET, FILM COATED ORAL at 21:06

## 2024-03-09 RX ADMIN — DONEPEZIL HYDROCHLORIDE 20 MG: 5 TABLET, FILM COATED ORAL at 21:07

## 2024-03-09 RX ADMIN — METOPROLOL TARTRATE 12.5 MG: 25 TABLET, FILM COATED ORAL at 21:07

## 2024-03-09 RX ADMIN — CHOLECALCIFEROL TAB 25 MCG (1000 UNIT) 1000 UNITS: 25 TAB at 10:21

## 2024-03-09 RX ADMIN — IPRATROPIUM BROMIDE AND ALBUTEROL SULFATE 1 DOSE: .5; 3 SOLUTION RESPIRATORY (INHALATION) at 05:48

## 2024-03-09 RX ADMIN — Medication 1 SPRAY: at 21:06

## 2024-03-09 RX ADMIN — METOPROLOL TARTRATE 12.5 MG: 25 TABLET, FILM COATED ORAL at 10:23

## 2024-03-09 RX ADMIN — DIGOXIN 125 MCG: 125 TABLET ORAL at 10:21

## 2024-03-09 RX ADMIN — POLYETHYLENE GLYCOL 3350 17 G: 17 POWDER, FOR SOLUTION ORAL at 14:49

## 2024-03-09 NOTE — PROGRESS NOTES
03/08/24 1943   Treatment   Treatment Type IS   Oxygen Therapy/Pulse Ox   Pulse 88   SpO2 90 %   Incentive Spirometry Tx   Treatment Effort Subsequent   Predicted Volume 478   Achieved Volume (mL) 400 mL

## 2024-03-09 NOTE — PROGRESS NOTES
filed at 3/8/2024 2119  Gross per 24 hour   Intake 490 ml   Output 200 ml   Net 290 ml      In: 490 [P.O.:480; I.V.:10]  Out: 200     TELEMETRY: SR    Physical Exam:  General Appearance:  Well Nourished  Respiratory:  Resp Auscultation: crackles  Cardiovascular:  Auscultation: Regular rate and rhythm, normal S1S2, no m/g/r/c  Palpation: Normal    Pedal Pulses: 2+ and equal   Abdomen:  Soft, NT, ND, + bs  Extremities:  No Cyanosis or Clubbing  Extremities: negative  Neurological/Psychiatric:  Oriented to time, place, and person  Non-anxious    Pertinent labs, diagnostic, device, and imaging results reviewed as a part of this visit    MEDICATIONS:   Scheduled Meds:   Scheduled Meds:   methylPREDNISolone  40 mg IntraVENous Daily    digoxin  125 mcg Oral Daily    spironolactone  25 mg Oral Once per day on Mon Wed Fri    apixaban  10 mg Oral BID    Followed by    [START ON 3/15/2024] apixaban  5 mg Oral BID    metoprolol tartrate  12.5 mg Oral BID    sodium chloride flush  5-40 mL IntraVENous 2 times per day    aspirin  81 mg Oral Daily    donepezil  20 mg Oral Nightly    levothyroxine  100 mcg Oral Daily    Vitamin D  1,000 Units Oral Daily     Continuous Infusions:   sodium chloride 10 mL/hr at 03/06/24 0201     PRN Meds:.sodium chloride, perflutren lipid microspheres, albuterol, sodium chloride flush, sodium chloride, potassium chloride **OR** potassium alternative oral replacement **OR** potassium chloride, magnesium sulfate, ondansetron **OR** ondansetron, polyethylene glycol, acetaminophen **OR** acetaminophen, docusate sodium, ipratropium 0.5 mg-albuterol 2.5 mg  Continuous Infusions:   sodium chloride 10 mL/hr at 03/06/24 0201       Intake/Output Summary (Last 24 hours) at 3/9/2024 0857  Last data filed at 3/8/2024 2119  Gross per 24 hour   Intake 490 ml   Output 200 ml   Net 290 ml       Lab Data:  CBC:   Lab Results   Component Value Date/Time    WBC 7.9 03/05/2024 06:46 PM    HGB 12.8 03/05/2024 06:46 PM     concerns were addressed to the patient. Alternatives to my treatment were discussed. I have discussed the above stated plan with patient and the nurse. The patient verbalized understanding and agreed with the plan.    I appreciate the opportunity of cooperating in the care of this individual.    MARSHALL NASH - CNP, ACNP, AGPCNP 3/9/2024, 8:57 AM  Heart Failure  [facility] [place] [address]  Ph: [phone]

## 2024-03-09 NOTE — PROGRESS NOTES
age. She is alert and oriented x 3 and in no acute distress.   HEENT: PERRLA, EOMI. No scleral icterus. No thrush, atraumatic, normocephalic. No oropharyngeal erythema seen.   NECK: Supple, without cervical or supraclavicular lymphadenopathy. No neck rigidity seen.   CARDIOVASCULAR: S1 S2 RRR. Without murmer.   RESPIRATORY & CHEST: Bibasilar minimal crackles heard.  No wheezing heard.  GASTROINTESTINAL & ABDOMEN: Soft, nontender, positive bowel sounds in all quadrants, without hepatosplenomegaly.   GENITOURINARY: Deferred.   MUSCULOSKELETAL: No tenderness to palpation of the axial skeleton. There is no clubbing. No cyanosis. No edema of the lower extremities. No joint deformities seen.   SKIN OF BODY: No rash or jaundice.  PSYCHIATRIC EVALUATION: Normal affect. Patient answers questions appropriately.   HEMATOLOGIC/LYMPHATIC/ IMMUNOLOGIC: No palpable lymphadenopathy.  NEUROLOGIC: Alert and oriented x 3.Groslly non-focal. Motor strength is 5+/5 in all muscle groups.       LABS:  Recent Labs     03/07/24  0459 03/08/24  0540 03/09/24  0615   * 129* 129*   K 3.9 3.8 3.9   CL 97* 93* 95*   CREATININE 0.8 0.6 0.5*   BUN 16 16 13   CO2 26 26 26       Recent Labs     03/07/24  0459 03/08/24  0540 03/09/24  0615   GLUCOSE 116* 113* 130*   CALCIUM 8.5 8.8 8.8   * 129* 129*   K 3.9 3.8 3.9   CO2 26 26 26   CL 97* 93* 95*   BUN 16 16 13   CREATININE 0.8 0.6 0.5*       No results for input(s): \"PHART\", \"AJX2HOE\", \"PO2ART\", \"IGH5UTS\", \"H3NQWZEK\", \"BEART\", \"J4NEPGBN\" in the last 72 hours.    Lab Results   Component Value Date    INR 1.03 02/12/2024    PROTIME 13.5 02/12/2024     No results found for: \"AMYLASE\"   No results found for: \"LABA1C\"  No results found for: \"EAG\"  Lab Results   Component Value Date    TSH 1.72 02/06/2023    T4FREE 1.11 11/20/2012     Lab Results   Component Value Date    TROPONINI <0.01 12/22/2020      Lab Results   Component Value Date    CRP 5.3 (H) 12/31/2020      No results found for:  Diffuse bilateral interstitial changes are noted.  This has progressed.  No pneumothorax is identified. Bony structures are unremarkable. Impression:    Progressive bilateral interstitial infiltrates.          IMPRESSION:     Acute respiratory distress, improving  Possible acute pulmonary edema, resolving  COVID related pulmonary fibrosis, stable  Acute hypoxic respiratory failure, fluctuating  Atrial fibrillation  Mild dementia    RECOMMENDATION:     Patient's respiratory status remains overall stable.    Oxygen requirements continue to fluctuate.    This morning she was on 15 L/min of oxygen supplementation.  During the interview I did decrease her to 10 L/min of oxygen supplementation and she was still saturating in low 90s.  Titrate flow to maintain SpO2 above 88%.  She is currently suffering with acute PE, possible pulmonary edema on the background of extensive COVID related pulmonary fibrosis.    Chest imaging repeated this morning was personally reviewed by me and it shows persistent bilateral pulmonary fibrotic changes with possibility of mild superadded pulmonary edema..  6.  Her lungs are already fibrosed, she is not wheezing.  No indications for steroids.  Will stop it.  7.  Will give her 1 more dose of Lasix 20 mg IV x 1 today.  8.  Her COVID-19 testing and influenza testing is negative.  RSV testing is also negative.  9.  She is afebrile without leukocytosis, no discolored expectoration and normal procalcitonin levels.  Suspicion for acute bacterial infection is low.  No indications for antibiotics.  10.  proBNP levels are again slightly elevated today.  Cardiology has added Aldactone 25 mg, Monday, Wednesday and Friday.  11.  Patient has suffered with an acute PE.  Unclear etiology.  Doppler of the lower extremity showed left-sided DVT.  12.  She has been transitioned to Eliquis.  This seems to be an unprovoked PE.  May have to take NOAC for the rest of her life.  13.  Titrate oxygen flow to maintain

## 2024-03-09 NOTE — PROGRESS NOTES
Date of Admission: 3/5/2024. Hospital Day: 5   88 y.o. female with a pmh of hypothyroidism mild dementia who presents with Acute respiratory failure , admitted for chf and  acute pe.  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]     Increased oxygen demand [R68.89]     Elevated brain natriuretic peptide (BNP) level [R79.89]     Acute pulmonary embolism ([facility]) [I26.99]     PAF (paroxysmal atrial fibrillation) ([facility]) [I48.0]     Atrial fibrillation with RVR ([facility]) [I48.91]     Acute respiratory failure ([facility]) [J96.00]    Acute on chronic hypoxic respiratory failure/ acute pulmonary embolism/ acute chf/pulm fibrosis  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 Monitor dig level   Not a candidate for amiodarone or ablation  Switched to starter dose eliquis for AC      DVT  Venous Doppler showing DVT in left popliteal and left posterior tibial vein  Switch Lovenox to Eliquis    Hyponatremia  Sodium 129, could be secondary to diuresis  Lasix stopped, switched to

## 2024-03-10 LAB
ANION GAP SERPL CALCULATED.3IONS-SCNC: 8 MMOL/L (ref 3–16)
BUN SERPL-MCNC: 16 MG/DL (ref 7–20)
CALCIUM SERPL-MCNC: 8.9 MG/DL (ref 8.3–10.6)
CHLORIDE SERPL-SCNC: 93 MMOL/L (ref 99–110)
CO2 SERPL-SCNC: 28 MMOL/L (ref 21–32)
CREAT SERPL-MCNC: 0.6 MG/DL (ref 0.6–1.2)
DIGOXIN SERPL-MCNC: 1.5 NG/ML (ref 0.8–2)
GFR SERPLBLD CREATININE-BSD FMLA CKD-EPI: >60 ML/MIN/{1.73_M2}
GLUCOSE SERPL-MCNC: 119 MG/DL (ref 70–99)
MAGNESIUM SERPL-MCNC: 2.1 MG/DL (ref 1.8–2.4)
POTASSIUM SERPL-SCNC: 4.4 MMOL/L (ref 3.5–5.1)
SODIUM SERPL-SCNC: 129 MMOL/L (ref 136–145)

## 2024-03-10 PROCEDURE — 6370000000 HC RX 637 (ALT 250 FOR IP): Performed by: STUDENT IN AN ORGANIZED HEALTH CARE EDUCATION/TRAINING PROGRAM

## 2024-03-10 PROCEDURE — 1200000000 HC SEMI PRIVATE

## 2024-03-10 PROCEDURE — 80162 ASSAY OF DIGOXIN TOTAL: CPT

## 2024-03-10 PROCEDURE — 51798 US URINE CAPACITY MEASURE: CPT

## 2024-03-10 PROCEDURE — 6360000002 HC RX W HCPCS: Performed by: STUDENT IN AN ORGANIZED HEALTH CARE EDUCATION/TRAINING PROGRAM

## 2024-03-10 PROCEDURE — 80048 BASIC METABOLIC PNL TOTAL CA: CPT

## 2024-03-10 PROCEDURE — 2700000000 HC OXYGEN THERAPY PER DAY

## 2024-03-10 PROCEDURE — 83735 ASSAY OF MAGNESIUM: CPT

## 2024-03-10 PROCEDURE — 99291 CRITICAL CARE FIRST HOUR: CPT | Performed by: INTERNAL MEDICINE

## 2024-03-10 PROCEDURE — 6370000000 HC RX 637 (ALT 250 FOR IP): Performed by: NURSE PRACTITIONER

## 2024-03-10 PROCEDURE — 99232 SBSQ HOSP IP/OBS MODERATE 35: CPT | Performed by: NURSE PRACTITIONER

## 2024-03-10 PROCEDURE — 94761 N-INVAS EAR/PLS OXIMETRY MLT: CPT

## 2024-03-10 PROCEDURE — 2580000003 HC RX 258: Performed by: INTERNAL MEDICINE

## 2024-03-10 PROCEDURE — 6370000000 HC RX 637 (ALT 250 FOR IP): Performed by: INTERNAL MEDICINE

## 2024-03-10 RX ORDER — FUROSEMIDE 20 MG/1
20 TABLET ORAL DAILY
Status: DISCONTINUED | OUTPATIENT
Start: 2024-03-10 | End: 2024-03-10

## 2024-03-10 RX ORDER — FUROSEMIDE 10 MG/ML
20 INJECTION INTRAMUSCULAR; INTRAVENOUS ONCE
Status: COMPLETED | OUTPATIENT
Start: 2024-03-10 | End: 2024-03-10

## 2024-03-10 RX ADMIN — APIXABAN 10 MG: 5 TABLET, FILM COATED ORAL at 20:37

## 2024-03-10 RX ADMIN — METOPROLOL TARTRATE 12.5 MG: 25 TABLET, FILM COATED ORAL at 20:36

## 2024-03-10 RX ADMIN — DIGOXIN 125 MCG: 125 TABLET ORAL at 08:51

## 2024-03-10 RX ADMIN — CHOLECALCIFEROL TAB 25 MCG (1000 UNIT) 1000 UNITS: 25 TAB at 08:51

## 2024-03-10 RX ADMIN — Medication 10 ML: at 08:52

## 2024-03-10 RX ADMIN — METOPROLOL TARTRATE 12.5 MG: 25 TABLET, FILM COATED ORAL at 10:27

## 2024-03-10 RX ADMIN — ASPIRIN 81 MG 81 MG: 81 TABLET ORAL at 08:52

## 2024-03-10 RX ADMIN — FUROSEMIDE 20 MG: 10 INJECTION, SOLUTION INTRAMUSCULAR; INTRAVENOUS at 08:51

## 2024-03-10 RX ADMIN — LEVOTHYROXINE SODIUM 100 MCG: 0.1 TABLET ORAL at 08:52

## 2024-03-10 RX ADMIN — APIXABAN 10 MG: 5 TABLET, FILM COATED ORAL at 08:51

## 2024-03-10 RX ADMIN — DONEPEZIL HYDROCHLORIDE 20 MG: 5 TABLET, FILM COATED ORAL at 20:37

## 2024-03-10 ASSESSMENT — PAIN SCALES - GENERAL
PAINLEVEL_OUTOF10: 0
PAINLEVEL_OUTOF10: 0

## 2024-03-10 NOTE — PROGRESS NOTES
03/10/24 1818   RT Protocol   History Pulmonary Disease 2   Respiratory pattern 0   Breath sounds 0   Cough 0   Bronchodilator Assessment Score 2

## 2024-03-10 NOTE — RT PROTOCOL NOTE
RT Inhaler-Nebulizer Bronchodilator Protocol Note    There is a bronchodilator order in the chart from a provider indicating to follow the RT Bronchodilator Protocol and there is an “Initiate RT Inhaler-Nebulizer Bronchodilator Protocol” order as well (see protocol at bottom of note).    CXR Findings:  XR CHEST PORTABLE    Result Date: 3/9/2024  1. Background of chronic interstitial pulmonary fibrosis with superimposed diffuse reticular and ground-glass opacity, likely reflecting a combination of moderate pulmonary edema and superimposed multifocal pneumonia. 2. Stable mild right hilar lymphadenopathy. 3. No significant interval change.       The findings from the last RT Protocol Assessment were as follows:   History Pulmonary Disease: Chronic pulmonary disease  Respiratory Pattern: Regular pattern and RR 12-20 bpm  Breath Sounds: Clear breath sounds  Cough: Strong, spontaneous, non-productive  Indication for Bronchodilator Therapy: Decreased or absent breath sounds  Bronchodilator Assessment Score: 2    Aerosolized bronchodilator medication orders have been revised according to the RT Inhaler-Nebulizer Bronchodilator Protocol below.    Respiratory Therapist to perform RT Therapy Protocol Assessment initially then follow the protocol.  Repeat RT Therapy Protocol Assessment PRN for score 0-3 or on second treatment, BID, and PRN for scores above 3.    No Indications - adjust the frequency to every 6 hours PRN wheezing or bronchospasm, if no treatments needed after 48 hours then discontinue using Per Protocol order mode.     If indication present, adjust the RT bronchodilator orders based on the Bronchodilator Assessment Score as indicated below.  Use Inhaler orders unless patient has one or more of the following: on home nebulizer, not able to hold breath for 10 seconds, is not alert and oriented, cannot activate and use MDI correctly, or respiratory rate 25 breaths per minute or more, then use the equivalent

## 2024-03-10 NOTE — PROGRESS NOTES
PULMONARY AND CRITICAL CARE MEDICINE PROGRESS NOTE    Subjective: Patient sitting in bed in no apparent respiratory distress.  Reports that her breathing is overall stable.  Still requires high flow oxygen at 15 L/min with oxygen saturation in high 80s to low 90s.  Able to speak in sentences.  Does not have any dyspnea on exertion.  On oral anticoagulation now.    REVIEW OF SYSTEMS:     Upon review of system is negative except that mentioned in the subjective portion.    PAST MEDICAL HISTORY:   Past Medical History:   Diagnosis Date    Adhesive capsulitis of shoulder     Hypothyroidism     On home O2     Osteoporosis, unspecified     Routine general medical examination at a health care facility     Seborrheic dermatitis, unspecified     Unspecified disorder of skin and subcutaneous tissue        PAST SURGICAL HISTORY:   Past Surgical History:   Procedure Laterality Date    CATARACT REMOVAL      left    CATARACT REMOVAL      right    COLONOSCOPY      OTHER SURGICAL HISTORY      anal fissure       SOCIAL HISTORY:   Social History     Tobacco Use    Smoking status: Never    Smokeless tobacco: Never   Substance Use Topics    Alcohol use: Not Currently     Comment: occasional    Drug use: No       FAMILY HISTORY:   Family History   Problem Relation Age of Onset    Hypertension Sister     Diabetes Sister     Stroke Father 68    High Blood Pressure Father     Heart Disease Mother 80    Cancer Paternal Aunt         breast       MEDICATIONS:     No current facility-administered medications on file prior to encounter.     Current Outpatient Medications on File Prior to Encounter   Medication Sig Dispense Refill    vitamin D 25 MCG (1000 UT) CAPS Take 1 capsule by mouth daily      fluticasone (FLONASE) 50 MCG/ACT nasal spray 1 spray by Each Nostril route in the morning and at bedtime 16 g 1    sodium chloride (OCEAN, BABY AYR) 0.65 % nasal spray 1 spray by Nasal route as needed for Congestion (Patient not taking: Reported

## 2024-03-10 NOTE — PROGRESS NOTES
[facility]  HEART FAILURE  Progress Note      Admit Date 3/5/2024     Reason for Consult:      Reason for Consultation/Chief Complaint: SOB    HPI:    Olive Mckeon is a 88 y.o. female with PMH dementia, new dx COVID pulmonary fibrosis admitted with hypoxia and SOB. Echo with mild concentric LVH, normal LVEF, grade 1 diastolic dysfunction, normal LV filling pressures, normal RV size and function, new onset AF this admit and PE.       Subjective:  Patient is being seen for SOB. There were no acute overnight cardiac events.   Today Ms. Mckeon is feeling less SOB today and denies chest pain,  palpitations, or dizziness      Baseline Weight: 145-150   Wt Readings from Last 3 Encounters:   03/10/24 64.9 kg (143 lb)   02/17/24 66.5 kg (146 lb 9.7 oz)   12/21/23 68.3 kg (150 lb 8 oz)         Cardiac Testing:   Echo:  3/6/24:  Summary   Mild concentric left ventricular hypertrophy. Normal left ventricular cavity   size and systolic function with an estimated ejection fraction of 60-65%. No   evident regional wall motion abnormalities seen.   Grade I diastolic dysfunction with normal LV filling pressures.   The right ventricle is normal in size and systolic function.   Mild tricuspid regurgitation with an estimated RVSP of 47 mmHg.  CT chest:  IMPRESSION:  Nonobstructive left lower lobe pulmonary embolus.  No heart strain.  The main  pulmonary artery is normal in caliber.     Increased patchy diffuse ground-glass opacity and interstitial prominence on  a background of chronic interstitial disease and possible emphysema.  Findings most likely represent pulmonary edema.  Diffuse atypical/viral  pneumonia is not excluded.     Evidence of old granulomatous disease.    NYHA Class III    Objective:   BP (!) 111/50   Pulse 82   Temp 98.3 °F (36.8 °C) (Temporal)   Resp 18   Ht 1.727 m (5' 8\")   Wt 64.9 kg (143 lb)   SpO2 (!) 81%   BMI 21.74 kg/m²     Intake/Output Summary (Last 24 hours) at 3/10/2024 0848  Last data

## 2024-03-10 NOTE — PLAN OF CARE
VSS. NSR on monitor. Patient is alert and oriented, denies any pain. Afebrile. Patient on regular diet, fair appetite. Patient was placed on heated high flow today, tolerating well. Patient denies feeling short of breath. Purewick in place with adequate urine output. Incontinent in brief x1, output noted with purewick. Assisted with turns for pressure ulcer prevention. Medication for DVT prophylaxis. Free from any injury. Patient and family updated on plan of care. Will continue plan of care.      Problem: ABCDS Injury Assessment  Goal: Absence of physical injury  Outcome: Progressing     Problem: Safety - Adult  Goal: Free from fall injury  Outcome: Progressing     Problem: Chronic Conditions and Co-morbidities  Goal: Patient's chronic conditions and co-morbidity symptoms are monitored and maintained or improved  Outcome: Progressing  Flowsheets (Taken 3/10/2024 0830)  Care Plan - Patient's Chronic Conditions and Co-Morbidity Symptoms are Monitored and Maintained or Improved: Monitor and assess patient's chronic conditions and comorbid symptoms for stability, deterioration, or improvement     Problem: Skin/Tissue Integrity  Goal: Absence of new skin breakdown  Description: 1.  Monitor for areas of redness and/or skin breakdown  2.  Assess vascular access sites hourly  3.  Every 4-6 hours minimum:  Change oxygen saturation probe site  4.  Every 4-6 hours:  If on nasal continuous positive airway pressure, respiratory therapy assess nares and determine need for appliance change or resting period.  Outcome: Progressing     Problem: Respiratory - Adult  Goal: Achieves optimal ventilation and oxygenation  Outcome: Progressing     Problem: Musculoskeletal - Adult  Goal: Return mobility to safest level of function  Outcome: Progressing  Goal: Return ADL status to a safe level of function  Outcome: Progressing     Problem: Nutrition Deficit:  Goal: Optimize nutritional status  Outcome: Progressing

## 2024-03-10 NOTE — PROGRESS NOTES
Date of Admission: 3/5/2024. Hospital Day: 6   88 y.o. female with a pmh of hypothyroidism mild dementia who presents with Acute respiratory failure , admitted for chf and  acute pe.  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]     Increased oxygen demand [R68.89]     Elevated brain natriuretic peptide (BNP) level [R79.89]     Acute pulmonary embolism ([facility]) [I26.99]     PAF (paroxysmal atrial fibrillation) ([facility]) [I48.0]     Atrial fibrillation with RVR ([facility]) [I48.91]     Acute respiratory failure ([facility]) [J96.00]    Acute on chronic hypoxic respiratory failure/ acute pulmonary embolism/ acute chf/pulm fibrosis  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 Monitor dig level   Not a candidate for amiodarone or ablation  Switched to starter dose eliquis for AC      DVT  Venous Doppler showing DVT in left popliteal and left posterior tibial vein  Switch Lovenox to Eliquis    Hyponatremia  Sodium 129, could be secondary to diuresis  Lasix stopped, switched to Aldactone by

## 2024-03-10 NOTE — PROGRESS NOTES
On 15LHFNC pt sats low 80's, with deep breaths sats increased to 88%. Placed pt on Airvo 40L/100% sats 96%. Pt resting comfortably and tolerating well at this time. Will continue to monitor

## 2024-03-11 LAB
ANION GAP SERPL CALCULATED.3IONS-SCNC: 8 MMOL/L (ref 3–16)
BASE EXCESS BLDA CALC-SCNC: 7.5 MMOL/L (ref -3–3)
BASOPHILS # BLD: 0 K/UL (ref 0–0.2)
BASOPHILS NFR BLD: 0 %
BUN SERPL-MCNC: 13 MG/DL (ref 7–20)
CALCIUM SERPL-MCNC: 8.7 MG/DL (ref 8.3–10.6)
CHLORIDE SERPL-SCNC: 94 MMOL/L (ref 99–110)
CO2 BLDA-SCNC: 73.8 MMOL/L
CO2 SERPL-SCNC: 28 MMOL/L (ref 21–32)
COHGB MFR BLDA: 1.6 % (ref 0–1.5)
CREAT SERPL-MCNC: 0.6 MG/DL (ref 0.6–1.2)
DEPRECATED RDW RBC AUTO: 14.2 % (ref 12.4–15.4)
EOSINOPHIL # BLD: 0.5 K/UL (ref 0–0.6)
EOSINOPHIL NFR BLD: 6 %
GFR SERPLBLD CREATININE-BSD FMLA CKD-EPI: >60 ML/MIN/{1.73_M2}
GLUCOSE SERPL-MCNC: 118 MG/DL (ref 70–99)
HCO3 BLDA-SCNC: 31.6 MMOL/L (ref 21–29)
HCT VFR BLD AUTO: 34.3 % (ref 36–48)
HGB BLD-MCNC: 11.7 G/DL (ref 12–16)
HGB BLDA-MCNC: 11.7 G/DL (ref 12–16)
LYMPHOCYTES # BLD: 1.2 K/UL (ref 1–5.1)
LYMPHOCYTES NFR BLD: 14 %
MAGNESIUM SERPL-MCNC: 1.9 MG/DL (ref 1.8–2.4)
MCH RBC QN AUTO: 29.9 PG (ref 26–34)
MCHC RBC AUTO-ENTMCNC: 34.1 G/DL (ref 31–36)
MCV RBC AUTO: 87.6 FL (ref 80–100)
METHGB MFR BLDA: 0.5 %
MONOCYTES # BLD: 0.2 K/UL (ref 0–1.3)
MONOCYTES NFR BLD: 2 %
NEUTROPHILS # BLD: 6.6 K/UL (ref 1.7–7.7)
NEUTROPHILS NFR BLD: 78 %
NT-PROBNP SERPL-MCNC: 617 PG/ML (ref 0–449)
O2 THERAPY: ABNORMAL
PCO2 BLDA: 42.1 MMHG (ref 35–45)
PH BLDA: 7.49 [PH] (ref 7.35–7.45)
PLATELET # BLD AUTO: 283 K/UL (ref 135–450)
PLATELET BLD QL SMEAR: ADEQUATE
PMV BLD AUTO: 7.8 FL (ref 5–10.5)
PO2 BLDA: 82.6 MMHG (ref 75–108)
POTASSIUM SERPL-SCNC: 4.1 MMOL/L (ref 3.5–5.1)
RBC # BLD AUTO: 3.91 M/UL (ref 4–5.2)
SAO2 % BLDA: 98.3 %
SLIDE REVIEW: ABNORMAL
SODIUM SERPL-SCNC: 130 MMOL/L (ref 136–145)
WBC # BLD AUTO: 8.5 K/UL (ref 4–11)

## 2024-03-11 PROCEDURE — 6370000000 HC RX 637 (ALT 250 FOR IP): Performed by: CLINICAL NURSE SPECIALIST

## 2024-03-11 PROCEDURE — 2580000003 HC RX 258: Performed by: INTERNAL MEDICINE

## 2024-03-11 PROCEDURE — 6370000000 HC RX 637 (ALT 250 FOR IP): Performed by: STUDENT IN AN ORGANIZED HEALTH CARE EDUCATION/TRAINING PROGRAM

## 2024-03-11 PROCEDURE — 80048 BASIC METABOLIC PNL TOTAL CA: CPT

## 2024-03-11 PROCEDURE — 94761 N-INVAS EAR/PLS OXIMETRY MLT: CPT

## 2024-03-11 PROCEDURE — 6370000000 HC RX 637 (ALT 250 FOR IP): Performed by: INTERNAL MEDICINE

## 2024-03-11 PROCEDURE — 6360000002 HC RX W HCPCS: Performed by: INTERNAL MEDICINE

## 2024-03-11 PROCEDURE — 2000000000 HC ICU R&B

## 2024-03-11 PROCEDURE — 99291 CRITICAL CARE FIRST HOUR: CPT | Performed by: INTERNAL MEDICINE

## 2024-03-11 PROCEDURE — 85025 COMPLETE CBC W/AUTO DIFF WBC: CPT

## 2024-03-11 PROCEDURE — 83735 ASSAY OF MAGNESIUM: CPT

## 2024-03-11 PROCEDURE — 82803 BLOOD GASES ANY COMBINATION: CPT

## 2024-03-11 PROCEDURE — 83880 ASSAY OF NATRIURETIC PEPTIDE: CPT

## 2024-03-11 PROCEDURE — 99232 SBSQ HOSP IP/OBS MODERATE 35: CPT | Performed by: NURSE PRACTITIONER

## 2024-03-11 PROCEDURE — 6370000000 HC RX 637 (ALT 250 FOR IP): Performed by: NURSE PRACTITIONER

## 2024-03-11 RX ORDER — METOPROLOL SUCCINATE 25 MG/1
25 TABLET, EXTENDED RELEASE ORAL DAILY
Qty: 30 TABLET | Refills: 3 | Status: SHIPPED | OUTPATIENT
Start: 2024-03-11 | End: 2024-03-27

## 2024-03-11 RX ORDER — FUROSEMIDE 10 MG/ML
40 INJECTION INTRAMUSCULAR; INTRAVENOUS 2 TIMES DAILY
Status: DISCONTINUED | OUTPATIENT
Start: 2024-03-11 | End: 2024-03-13

## 2024-03-11 RX ORDER — SPIRONOLACTONE 25 MG/1
25 TABLET ORAL
Qty: 30 TABLET | Refills: 3 | Status: SHIPPED | OUTPATIENT
Start: 2024-03-13 | End: 2024-03-27

## 2024-03-11 RX ORDER — DIGOXIN 125 MCG
125 TABLET ORAL DAILY
Qty: 30 TABLET | Refills: 3 | Status: SHIPPED | OUTPATIENT
Start: 2024-03-12 | End: 2024-03-27

## 2024-03-11 RX ADMIN — APIXABAN 10 MG: 5 TABLET, FILM COATED ORAL at 08:15

## 2024-03-11 RX ADMIN — METOPROLOL TARTRATE 12.5 MG: 25 TABLET, FILM COATED ORAL at 19:59

## 2024-03-11 RX ADMIN — APIXABAN 10 MG: 5 TABLET, FILM COATED ORAL at 19:59

## 2024-03-11 RX ADMIN — LEVOTHYROXINE SODIUM 100 MCG: 0.1 TABLET ORAL at 08:15

## 2024-03-11 RX ADMIN — DIGOXIN 125 MCG: 125 TABLET ORAL at 08:14

## 2024-03-11 RX ADMIN — CHOLECALCIFEROL TAB 25 MCG (1000 UNIT) 1000 UNITS: 25 TAB at 08:15

## 2024-03-11 RX ADMIN — Medication 10 ML: at 20:05

## 2024-03-11 RX ADMIN — FUROSEMIDE 40 MG: 10 INJECTION, SOLUTION INTRAMUSCULAR; INTRAVENOUS at 18:44

## 2024-03-11 RX ADMIN — SPIRONOLACTONE 25 MG: 25 TABLET ORAL at 08:14

## 2024-03-11 RX ADMIN — Medication 10 ML: at 08:17

## 2024-03-11 RX ADMIN — ASPIRIN 81 MG 81 MG: 81 TABLET ORAL at 08:15

## 2024-03-11 RX ADMIN — METOPROLOL TARTRATE 12.5 MG: 25 TABLET, FILM COATED ORAL at 08:15

## 2024-03-11 RX ADMIN — DONEPEZIL HYDROCHLORIDE 20 MG: 5 TABLET, FILM COATED ORAL at 19:59

## 2024-03-11 ASSESSMENT — PAIN SCALES - GENERAL: PAINLEVEL_OUTOF10: 0

## 2024-03-11 NOTE — PROGRESS NOTES
[facility]  HEART FAILURE  Progress Note      Admit Date 3/5/2024     Reason for Consult:      Reason for Consultation/Chief Complaint: SOB    HPI:    Olive Mckeon is a 88 y.o. female with PMH dementia, new dx COVID pulmonary fibrosis admitted with hypoxia and SOB. Echo with mild concentric LVH, normal LVEF, grade 1 diastolic dysfunction, normal LV filling pressures, normal RV size and function, new onset AF this admit and PE.       Subjective:  Patient is being seen for SOB. There were no acute overnight cardiac events.   Today Ms. Mckeon is feeling about the same today, now on high flow heated cannula, she denies chest pain,  palpitations, or dizziness      Baseline Weight: 145-150   Wt Readings from Last 3 Encounters:   03/11/24 64.8 kg (142 lb 14.4 oz)   02/17/24 66.5 kg (146 lb 9.7 oz)   12/21/23 68.3 kg (150 lb 8 oz)         Cardiac Testing:   Echo:  3/6/24:  Summary   Mild concentric left ventricular hypertrophy. Normal left ventricular cavity   size and systolic function with an estimated ejection fraction of 60-65%. No   evident regional wall motion abnormalities seen.   Grade I diastolic dysfunction with normal LV filling pressures.   The right ventricle is normal in size and systolic function.   Mild tricuspid regurgitation with an estimated RVSP of 47 mmHg.  CT chest:  IMPRESSION:  Nonobstructive left lower lobe pulmonary embolus.  No heart strain.  The main  pulmonary artery is normal in caliber.     Increased patchy diffuse ground-glass opacity and interstitial prominence on  a background of chronic interstitial disease and possible emphysema.  Findings most likely represent pulmonary edema.  Diffuse atypical/viral  pneumonia is not excluded.     Evidence of old granulomatous disease.    NYHA Class III    Objective:   BP (!) 114/55   Pulse 91   Temp 98.9 °F (37.2 °C) (Temporal)   Resp 20   Ht 1.727 m (5' 8\")   Wt 64.8 kg (142 lb 14.4 oz)   SpO2 97%   BMI 21.73 kg/m²     Intake/Output  Summary (Last 24 hours) at 3/11/2024 0917  Last data filed at 3/11/2024 0600  Gross per 24 hour   Intake 530 ml   Output 1025 ml   Net -495 ml      In: 770 [P.O.:770]  Out: 1025     TELEMETRY: SR    Physical Exam:  General Appearance:  Well Nourished  Respiratory:  Resp Auscultation: crackles  Cardiovascular:  Auscultation: Regular rate and rhythm, normal S1S2, no m/g/r/c  Palpation: Normal    Pedal Pulses: 2+ and equal   Abdomen:  Soft, NT, ND, + bs  Extremities:  No Cyanosis or Clubbing  Extremities: negative  Neurological/Psychiatric:  Oriented to time, place, and person  Non-anxious    Pertinent labs, diagnostic, device, and imaging results reviewed as a part of this visit    MEDICATIONS:   Scheduled Meds:   Scheduled Meds:   digoxin  125 mcg Oral Daily    spironolactone  25 mg Oral Once per day on Mon Wed Fri    apixaban  10 mg Oral BID    Followed by    [START ON 3/15/2024] apixaban  5 mg Oral BID    metoprolol tartrate  12.5 mg Oral BID    sodium chloride flush  5-40 mL IntraVENous 2 times per day    aspirin  81 mg Oral Daily    donepezil  20 mg Oral Nightly    levothyroxine  100 mcg Oral Daily    Vitamin D  1,000 Units Oral Daily     Continuous Infusions:   sodium chloride 10 mL/hr at 03/06/24 0201     PRN Meds:.sodium chloride, perflutren lipid microspheres, albuterol, sodium chloride flush, sodium chloride, potassium chloride **OR** potassium alternative oral replacement **OR** potassium chloride, magnesium sulfate, ondansetron **OR** ondansetron, polyethylene glycol, acetaminophen **OR** acetaminophen, docusate sodium, ipratropium 0.5 mg-albuterol 2.5 mg  Continuous Infusions:   sodium chloride 10 mL/hr at 03/06/24 0201       Intake/Output Summary (Last 24 hours) at 3/11/2024 0917  Last data filed at 3/11/2024 0600  Gross per 24 hour   Intake 530 ml   Output 1025 ml   Net -495 ml       Lab Data:  CBC:   Lab Results   Component Value Date/Time    WBC 7.9 03/05/2024 06:46 PM    HGB 12.8 03/05/2024 06:46 PM

## 2024-03-11 NOTE — PROGRESS NOTES
Nutrition Education    Provided heart failure diet education. HF is a new dx for the pt. Education included low sodium diet guidelines (3-4 gm Na+/day) and fluid restriction (64 oz/day). Reviewed foods to choose and foods to avoid, along with label reading and ways to add flavor to food. Pt reported lives at home and does her own grocery shopping; does not currently follow a low sodium diet but does not tend to add salt to foods. Pt states understanding of education. Time spent with patient: 10 minutes.      Educated on 3/11  Learners: Patient  Readiness: Acceptance  Method: Explanation and Handout  Response: Verbalizes Understanding  Contact name and number provided.    Daksha Bird MS, RD, LD  Contact Number: [phone]

## 2024-03-11 NOTE — PROGRESS NOTES
Patient arrived to unit. Alert and oriented, disoriented to time and situation at times.  On Airvo 60% , 30L   SpO2 80% from NRB to Airvo and SpO2 up to 93% once Airvo applied, denies pain.  SR on monitor.

## 2024-03-11 NOTE — PROGRESS NOTES
[facility] Pulmonary/CCM Progress note      Admit Date: 3/5/2024    Chief Complaint: Shortness of breath    Subjective:     Interval History: Patient's son was at bedside.  Patient is currently requiring Airvo 70% / 30 L.  Appears comfortable.  Cough with mucoid phlegm only.  Denies chest pain.  Otherwise hemodynamically stable.  Awake and alert.    Scheduled Meds:   digoxin  125 mcg Oral Daily    spironolactone  25 mg Oral Once per day on Mon Wed Fri    apixaban  10 mg Oral BID    Followed by    [START ON 3/15/2024] apixaban  5 mg Oral BID    metoprolol tartrate  12.5 mg Oral BID    sodium chloride flush  5-40 mL IntraVENous 2 times per day    aspirin  81 mg Oral Daily    donepezil  20 mg Oral Nightly    levothyroxine  100 mcg Oral Daily    Vitamin D  1,000 Units Oral Daily     Continuous Infusions:   sodium chloride 10 mL/hr at 03/06/24 0201     PRN Meds:sodium chloride, perflutren lipid microspheres, albuterol, sodium chloride flush, sodium chloride, potassium chloride **OR** potassium alternative oral replacement **OR** potassium chloride, magnesium sulfate, ondansetron **OR** ondansetron, polyethylene glycol, acetaminophen **OR** acetaminophen, docusate sodium, ipratropium 0.5 mg-albuterol 2.5 mg    Review of Systems  Constitutional: negative for fatigue, fevers, malaise and weight loss  Ears, nose, mouth, throat: negative for ear drainage, epistaxis, hoarseness, nasal congestion, sore throat and voice change  Respiratory: negative except for cough, shortness of breath, and sputum  Cardiovascular: negative for chest pain, chest pressure/discomfort, irregular heart beat, lower extremity edema and palpitations  Gastrointestinal: negative for abdominal pain, constipation, diarrhea, jaundice, melena, odynophagia, reflux symptoms and vomiting  Hematologic/lymphatic: negative for bleeding, easy bruising, lymphadenopathy and petechiae  Musculoskeletal:negative for arthralgias, bone pain, muscle weakness, neck pain and stiff  no acute abnormality of the thoracic aorta.     Lungs/pleura: There appears to be a background mild emphysema and chronic  interstitial lung disease however interstitial prominence and degree of  patchy diffuse ground-glass opacity is increased compared to 02/12/2024.  No  consolidating infiltrate is identified.  There is no pleural effusion or  pneumothorax.  There are a few tiny scattered calcified granulomas.     Upper Abdomen: Limited images of the upper abdomen are noted for a  gallbladder filled with stones but otherwise unremarkable.  Calcified  granulomas are noted in the liver and spleen.     Soft Tissues/Bones: No acute bone or soft tissue abnormality.     IMPRESSION:  Nonobstructive left lower lobe pulmonary embolus.  No heart strain.  The main  pulmonary artery is normal in caliber.     Increased patchy diffuse ground-glass opacity and interstitial prominence on  a background of chronic interstitial disease and possible emphysema.  Findings most likely represent pulmonary edema.  Diffuse atypical/viral  pneumonia is not excluded.     Evidence of old granulomatous disease.    Problem List:     Acute hypoxic respiratory failure requiring Airvo  History of COVID-19 pneumonia with pulmonary fibrosis  Acute pulmonary edema  Acute PE  Assessment/Plan:     Patient has history of COVID-19 pneumonia in December 2020, requiring oxygen temporarily.  Patient apparently still has home oxygen which she uses as needed-requiring up to 3 L.  Worsening dyspnea, related to complex issues-pulmonary fibrosis, acute pulmonary edema and acute PE of left lower lobe branch.  Patient is now requiring Airvo-70% / 30 L.  Appears comfortable.  Start using BiPAP as needed and at nighttime.  No respiratory distress.    Reviewed all imaging available including CTA chest, shows bilateral advanced pulmonary fibrosis particularly at the bases with superimposed acute bilateral groundglass opacities and left lower lobe branch PE.    2D echo

## 2024-03-11 NOTE — PROGRESS NOTES
Date of Admission: 3/5/2024. Hospital Day: 7   88 y.o. female with a pmh of hypothyroidism mild dementia , pulmonary fibrosis who presents with Acute on chronic respiratory failure , admitted for chf and  acute pe, A-fib RVR and mild CHF.  Respiratory status somewhat worsened after admission but currently stable on Airvo,expect slow improvement, anticipate discharge to LTAC , patient and family on board  Assessment/Plan:    Active Hospital Problems    Diagnosis     Mild dementia without behavioral disturbance, psychotic disturbance, mood disturbance, or anxiety ([facility]) [F03.A0]      Priority: Medium    Acute diastolic (congestive) heart failure ([facility]) [I50.31]     Acute pulmonary edema (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) [I48.0]     Atrial fibrillation with RVR ([facility]) [I48.91]     Acute respiratory failure ([facility]) [J96.00]    Acute on chronic hypoxic respiratory failure/ acute pulmonary embolism/ acute chf/pulm fibrosis  Likely combination of  pulmonary fibrosis, acute PE and acute CHF ppted by  RVR  3/11 on 30 L Airvo  Echocardiogram unremarkable, no evidence of right heart strain  XR chest 3/7 revealed progressive bilateral opacities  Strict TONY, monitor BMP and mag  Cardiology consulted  Incentive spirometry  Anticipate prolonged course, may consider LTAC     A-fib RVR  Currently sinus rhythm   continue digoxin and metoprolol. Monitor dig level   Not a candidate for amiodarone or ablation  Switched to eliquis for AC      DVT  Venous Doppler showing DVT in left popliteal and left posterior tibial vein  Switch Lovenox to starter dose Eliquis    Hyponatremia  Sodium 129, could be secondary to diuresis  Lasix stopped, switched to Aldactone by cardiology      Hypothyroidism  Continue home Synthroid    dementia,    Findings most likely represent pulmonary edema.  Diffuse atypical/viral   pneumonia is not excluded.      Evidence of old granulomatous disease.         XR CHEST PORTABLE   Final Result      The mediastinum appears to widen which is new from the previous study.  This   may be related to fluid overload             IP CONSULT TO CARDIOLOGY  IP CONSULT TO PULMONOLOGY  IP CONSULT TO HEART FAILURE NURSE/COORDINATOR  IP CONSULT TO DIETITIAN  IP CONSULT TO PHARMACY      Al Marie MD

## 2024-03-12 LAB
BASOPHILS # BLD: 0.1 K/UL (ref 0–0.2)
BASOPHILS NFR BLD: 1 %
DEPRECATED RDW RBC AUTO: 14.2 % (ref 12.4–15.4)
EOSINOPHIL # BLD: 1 K/UL (ref 0–0.6)
EOSINOPHIL NFR BLD: 10.3 %
HCT VFR BLD AUTO: 35.7 % (ref 36–48)
HGB BLD-MCNC: 12.2 G/DL (ref 12–16)
LYMPHOCYTES # BLD: 0.9 K/UL (ref 1–5.1)
LYMPHOCYTES NFR BLD: 9.6 %
MCH RBC QN AUTO: 29.4 PG (ref 26–34)
MCHC RBC AUTO-ENTMCNC: 34.2 G/DL (ref 31–36)
MCV RBC AUTO: 86.1 FL (ref 80–100)
MONOCYTES # BLD: 0.8 K/UL (ref 0–1.3)
MONOCYTES NFR BLD: 8.6 %
NEUTROPHILS # BLD: 6.7 K/UL (ref 1.7–7.7)
NEUTROPHILS NFR BLD: 70.5 %
NT-PROBNP SERPL-MCNC: 821 PG/ML (ref 0–449)
PLATELET # BLD AUTO: 342 K/UL (ref 135–450)
PMV BLD AUTO: 7.7 FL (ref 5–10.5)
RBC # BLD AUTO: 4.14 M/UL (ref 4–5.2)
WBC # BLD AUTO: 9.4 K/UL (ref 4–11)

## 2024-03-12 PROCEDURE — 99291 CRITICAL CARE FIRST HOUR: CPT | Performed by: INTERNAL MEDICINE

## 2024-03-12 PROCEDURE — 2580000003 HC RX 258: Performed by: INTERNAL MEDICINE

## 2024-03-12 PROCEDURE — 99231 SBSQ HOSP IP/OBS SF/LOW 25: CPT | Performed by: NURSE PRACTITIONER

## 2024-03-12 PROCEDURE — 85025 COMPLETE CBC W/AUTO DIFF WBC: CPT

## 2024-03-12 PROCEDURE — 2000000000 HC ICU R&B

## 2024-03-12 PROCEDURE — 97530 THERAPEUTIC ACTIVITIES: CPT

## 2024-03-12 PROCEDURE — 83880 ASSAY OF NATRIURETIC PEPTIDE: CPT

## 2024-03-12 PROCEDURE — 94761 N-INVAS EAR/PLS OXIMETRY MLT: CPT

## 2024-03-12 PROCEDURE — 2700000000 HC OXYGEN THERAPY PER DAY

## 2024-03-12 PROCEDURE — 6370000000 HC RX 637 (ALT 250 FOR IP): Performed by: INTERNAL MEDICINE

## 2024-03-12 PROCEDURE — 97535 SELF CARE MNGMENT TRAINING: CPT

## 2024-03-12 PROCEDURE — 6370000000 HC RX 637 (ALT 250 FOR IP): Performed by: STUDENT IN AN ORGANIZED HEALTH CARE EDUCATION/TRAINING PROGRAM

## 2024-03-12 PROCEDURE — 6370000000 HC RX 637 (ALT 250 FOR IP): Performed by: NURSE PRACTITIONER

## 2024-03-12 PROCEDURE — 6360000002 HC RX W HCPCS: Performed by: INTERNAL MEDICINE

## 2024-03-12 RX ADMIN — METOPROLOL TARTRATE 12.5 MG: 25 TABLET, FILM COATED ORAL at 09:08

## 2024-03-12 RX ADMIN — METOPROLOL TARTRATE 12.5 MG: 25 TABLET, FILM COATED ORAL at 20:51

## 2024-03-12 RX ADMIN — DIGOXIN 125 MCG: 125 TABLET ORAL at 09:08

## 2024-03-12 RX ADMIN — APIXABAN 10 MG: 5 TABLET, FILM COATED ORAL at 20:50

## 2024-03-12 RX ADMIN — DONEPEZIL HYDROCHLORIDE 20 MG: 5 TABLET, FILM COATED ORAL at 20:50

## 2024-03-12 RX ADMIN — FUROSEMIDE 40 MG: 10 INJECTION, SOLUTION INTRAMUSCULAR; INTRAVENOUS at 09:09

## 2024-03-12 RX ADMIN — Medication 10 ML: at 09:09

## 2024-03-12 RX ADMIN — APIXABAN 10 MG: 5 TABLET, FILM COATED ORAL at 09:09

## 2024-03-12 RX ADMIN — CHOLECALCIFEROL TAB 25 MCG (1000 UNIT) 1000 UNITS: 25 TAB at 09:09

## 2024-03-12 RX ADMIN — FUROSEMIDE 40 MG: 10 INJECTION, SOLUTION INTRAMUSCULAR; INTRAVENOUS at 18:13

## 2024-03-12 RX ADMIN — ASPIRIN 81 MG 81 MG: 81 TABLET ORAL at 09:09

## 2024-03-12 RX ADMIN — Medication 10 ML: at 20:52

## 2024-03-12 RX ADMIN — LEVOTHYROXINE SODIUM 100 MCG: 0.1 TABLET ORAL at 09:09

## 2024-03-12 ASSESSMENT — PAIN SCALES - GENERAL
PAINLEVEL_OUTOF10: 0

## 2024-03-12 NOTE — CARE COORDINATION
Discharge Planning Note:    CM attempted to call admissions at [facility] to f/u with referral, no answer, LVM with request for call back.     CM will ask that pre-cert be started if accepted.

## 2024-03-12 NOTE — PROGRESS NOTES
[facility] Pulmonary/CCM Progress note      Admit Date: 3/5/2024    Chief Complaint: Shortness of breath    Subjective:     Interval History: Still requiring Airvo 60% / 30 L, has some increased work of breathing.  BiPAP was not used overnight.  No significant cough or phlegm.    Scheduled Meds:   furosemide  40 mg IntraVENous BID    digoxin  125 mcg Oral Daily    spironolactone  25 mg Oral Once per day on Mon Wed Fri    apixaban  10 mg Oral BID    Followed by    [START ON 3/15/2024] apixaban  5 mg Oral BID    metoprolol tartrate  12.5 mg Oral BID    sodium chloride flush  5-40 mL IntraVENous 2 times per day    aspirin  81 mg Oral Daily    donepezil  20 mg Oral Nightly    levothyroxine  100 mcg Oral Daily    Vitamin D  1,000 Units Oral Daily     Continuous Infusions:   sodium chloride 10 mL/hr at 03/06/24 0201     PRN Meds:sodium chloride, perflutren lipid microspheres, albuterol, sodium chloride flush, sodium chloride, potassium chloride **OR** potassium alternative oral replacement **OR** potassium chloride, magnesium sulfate, ondansetron **OR** ondansetron, polyethylene glycol, acetaminophen **OR** acetaminophen, docusate sodium, ipratropium 0.5 mg-albuterol 2.5 mg    Review of Systems  Constitutional: negative for fatigue, fevers, malaise and weight loss  Ears, nose, mouth, throat: negative for ear drainage, epistaxis, hoarseness, nasal congestion, sore throat and voice change  Respiratory: negative except for cough, shortness of breath, and sputum  Cardiovascular: negative for chest pain, chest pressure/discomfort, irregular heart beat, lower extremity edema and palpitations  Gastrointestinal: negative for abdominal pain, constipation, diarrhea, jaundice, melena, odynophagia, reflux symptoms and vomiting  Hematologic/lymphatic: negative for bleeding, easy bruising, lymphadenopathy and petechiae  Musculoskeletal:negative for arthralgias, bone pain, muscle weakness, neck pain and stiff joints  Neurological: negative for

## 2024-03-12 NOTE — PROGRESS NOTES
Assessment complete, vitals obtained. NSR. Pt a/o, denies pain or sob at rest. On AirVo 30L 59% Fio2. Respirations tachy & shallow at times. Diminished breath sounds w/ crackles & wheezing to LLL. Ab soft, + bowel sounds. Skin warm, dry. Nonpitting edema to BLE. Pulses palpable. External catheter in place. Due meds given, see MAR. Assisted pt to reposition. Pt denies further needs. Call light in reach. Safety precautions in place.

## 2024-03-12 NOTE — PROGRESS NOTES
[facility]  HEART FAILURE  Progress Note      Admit Date 3/5/2024     Reason for Consult:      Reason for Consultation/Chief Complaint: SOB    HPI:    Olive Mckeon is a 88 y.o. female with PMH dementia, new dx COVID pulmonary fibrosis admitted with hypoxia and SOB. Echo with mild concentric LVH, normal LVEF, grade 1 diastolic dysfunction, normal LV filling pressures, normal RV size and function, new onset AF this admit and PE.       Subjective:  Patient is being seen for SOB. There were no acute overnight cardiac events.   Today Ms. Mckeon is stable, eating breakfast and remains on high flow heated cannula, she denies chest pain, palpitations, or dizziness      Baseline Weight:   Wt Readings from Last 3 Encounters:   03/12/24 59.7 kg (131 lb 9.8 oz)   02/17/24 66.5 kg (146 lb 9.7 oz)   12/21/23 68.3 kg (150 lb 8 oz)         Cardiac Testing:   Echo:  3/6/24:  Summary   Mild concentric left ventricular hypertrophy. Normal left ventricular cavity   size and systolic function with an estimated ejection fraction of 60-65%. No   evident regional wall motion abnormalities seen.   Grade I diastolic dysfunction with normal LV filling pressures.   The right ventricle is normal in size and systolic function.   Mild tricuspid regurgitation with an estimated RVSP of 47 mmHg.  CT chest:  IMPRESSION:  Nonobstructive left lower lobe pulmonary embolus.  No heart strain.  The main  pulmonary artery is normal in caliber.     Increased patchy diffuse ground-glass opacity and interstitial prominence on  a background of chronic interstitial disease and possible emphysema.  Findings most likely represent pulmonary edema.  Diffuse atypical/viral  pneumonia is not excluded.     Evidence of old granulomatous disease.    NYHA Class III    Objective:   BP (!) 110/48   Pulse 75   Temp 97.8 °F (36.6 °C) (Temporal)   Resp 24   Ht 1.727 m (5' 8\")   Wt 59.7 kg (131 lb 9.8 oz)   SpO2 95%   BMI 20.01 kg/m²     Intake/Output Summary  concerns      All questions and concerns were addressed to the patient. Alternatives to my treatment were discussed. I have discussed the above stated plan with patient and the nurse. The patient verbalized understanding and agreed with the plan.    I appreciate the opportunity of cooperating in the care of this individual.    MARSHALL NASH - CNP, ACNP, AGPCNP 3/12/2024, 9:39 AM  Heart Failure  [facility] [place] [address]  Ph: [phone]

## 2024-03-12 NOTE — PROGRESS NOTES
Date of Admission: 3/5/2024. Hospital Day: 8     Brief course: This 88 y.o. female with PMHx of hypothyroidism, dementia, pulmonary fibrosis 2/2 COVID pneumonia who presented with SOB and hypoxia.  Admitted for acute hypoxic respiratory failure, CHF, acute PE, A-fib RVR.      Interval history: Pt seen and examined today.  Overnight events noted, interval ancillary notes and labs reviewed.   Remains on Airvo 60% @ 30 L;satting 95%; wean as tolerated keep sats over 92%  Afebrile overnight, WBCs WNL.  Serum sodium 130 this morning continue IV Lasix     but denied cough, SOB, chest pain, nausea, vomiting or abdominal pain          Assessment/Plan:    Acute on chronic hypoxic respiratory failure likely  2/2 underlying pulmonary fibrosis/PE /A-fib and CHF  CTA chest, shows bilateral advanced pulmonary fibrosis particularly at the bases with superimposed acute bilateral groundglass opacities and left lower lobe branch PE.  Pulmonology on board; recommended repeating CTA chest tomorrow if no significant clinical improvement   Continue Lasix, breathing treatment, inhalers, awaiting down airway as tolerated.  LTAC following    Acute Heart Failure volume improved  BNP elevated to 1408 on admission; down to 821 with diuresis  Echo on 3/5/24  showed LVH, EF 60 to 65% with grade 1 DD and RVSP of 47  Cardiology on board; continue IV Lasix and spironolactone MWF  Monitor electrolyte closely while on IV diuresis and replace as needed.  Daily weights fluid and salt restriction.    A-fib RVR; Currently sinus rhythm   Cardiology on board; continue digoxin, metoprolol and Eliquis.  Monitor dig level   Not a candidate for amiodarone or ablation.  Monitor on telemetry    PE/DVT; CTPA with left lower lobe branch PE. Dopplers showed DVT in left popliteal and left posterior tibial vein.   Continue Eliquis    Hyponatremia; likely 2/2 fluid overload  Continue IV diuresis.  Monitor sodium level closely      Hypothyroidism; Continue  synthroid    Mild dementia; continue donezepil      DVT Prophylaxis:    Diet: ADULT DIET; Regular; No Added Salt (3-4 gm); 1500 ml  ADULT ORAL NUTRITION SUPPLEMENT; Breakfast, Lunch, Dinner; Standard High Calorie/High Protein Oral Supplement  Code Status: DNR-CCA      Dispo - home    All  follow up labs and imaging personally reviewed          Medications:  Reviewed    Infusion Medications    sodium chloride 10 mL/hr at 03/06/24 0201     Scheduled Medications    furosemide  40 mg IntraVENous BID    digoxin  125 mcg Oral Daily    spironolactone  25 mg Oral Once per day on Mon Wed Fri    apixaban  10 mg Oral BID    Followed by    [START ON 3/15/2024] apixaban  5 mg Oral BID    metoprolol tartrate  12.5 mg Oral BID    sodium chloride flush  5-40 mL IntraVENous 2 times per day    aspirin  81 mg Oral Daily    donepezil  20 mg Oral Nightly    levothyroxine  100 mcg Oral Daily    Vitamin D  1,000 Units Oral Daily     PRN Meds: sodium chloride, perflutren lipid microspheres, albuterol, sodium chloride flush, sodium chloride, potassium chloride **OR** potassium alternative oral replacement **OR** potassium chloride, magnesium sulfate, ondansetron **OR** ondansetron, polyethylene glycol, acetaminophen **OR** acetaminophen, docusate sodium, ipratropium 0.5 mg-albuterol 2.5 mg      Intake/Output Summary (Last 24 hours) at 3/12/2024 0907  Last data filed at 3/12/2024 0632  Gross per 24 hour   Intake 610 ml   Output 1715 ml   Net -1105 ml         Physical Exam Performed:    BP (!) 110/48   Pulse 75   Temp 97.8 °F (36.6 °C) (Temporal)   Resp 24   Ht 1.727 m (5' 8\")   Wt 59.7 kg (131 lb 9.8 oz)   SpO2 95%   BMI 20.01 kg/m²     General: NAD  Eyes: EOMI  ENT: neck supple  Cardiovascular: Regular rate, rhythm  Respiratory:  mild resp distress, takes shallow breath , bilateral fine crackles   Gastrointestinal: Soft, non tender  Genitourinary: no suprapubic tenderness  Musculoskeletal: No edema  Skin: warm, dry  Neuro:

## 2024-03-12 NOTE — CARE COORDINATION
Discharge Planning Note:    CM met with pt bedside to discuss Critical access hospital and to see if pt would be agreeable to referral being sent since [facility] is not able to accept insurance.  Pt agreeable but also wants to discuss with daughter Brie.  Per pt, Brie works until 2pm and will come to hospital after.     CM faxed referral to [facility] at Critical access hospital and place f/u call with notification of incoming referral.  Cm also attempted to call dtr Brie but unable to LVM.  CM will re-attempt a call or watch for arrival to room.     Electronically signed by Tamy Snell RN on 3/12/2024 at 11:09 AM

## 2024-03-12 NOTE — PROGRESS NOTES
[facility] - Inpatient Rehabilitation Department   Phone: [phone]    Occupational Therapy    [] Initial Evaluation            [x] Daily Treatment Note         [] Discharge Summary      Patient: Olive Mckeon   : 1935   MRN: [id number]   Date of Service:  3/12/2024    Admitting Diagnosis:  Acute respiratory failure (HCC)  Current Admission Summary: She presented to the hospital in feb for pneumonia/covid related pulmonary fibrosis and discharged on 2 L of oxygen. She is now having increased shortness of breath and found to have sats in the 70s. Bnp 1408 she received some IV lasix. CT chest shows new LLL pulmonary embolism. Echo with normal LVEF and grade 1 diastolic dysfunction. She went into AF with RVR. Dopplers show acute LLE DVT with plan to switch Lovenox to therapeutic dose Lovenox      Past Medical History:  has a past medical history of Adhesive capsulitis of shoulder, Hypothyroidism, On home O2, Osteoporosis, unspecified, Routine general medical examination at a health care facility, Seborrheic dermatitis, unspecified, and Unspecified disorder of skin and subcutaneous tissue.  Past Surgical History:  has a past surgical history that includes Cataract removal; Cataract removal; other surgical history; and Colonoscopy.    Discharge Recommendations: Olive Mckeon scored a 13/24 on the AM-PAC ADL Inpatient form. Current research shows that an AM-PAC score of 17 or less is typically not associated with a discharge to the patient's home setting. Based on the patient's AM-PAC score and their current ADL deficits, it is recommended that the patient have 3-5 sessions per week of Occupational Therapy at d/c to increase the patient's independence.  Please see assessment section for further patient specific details.    If patient discharges prior to next session this note will serve as a discharge summary.  Please see below for the latest assessment towards goals.       DME Required For

## 2024-03-13 PROBLEM — E43 SEVERE MALNUTRITION (HCC): Chronic | Status: ACTIVE | Noted: 2024-01-01

## 2024-03-13 LAB
ANION GAP SERPL CALCULATED.3IONS-SCNC: 12 MMOL/L (ref 3–16)
BASOPHILS # BLD: 0 K/UL (ref 0–0.2)
BASOPHILS NFR BLD: 0 %
BUN SERPL-MCNC: 22 MG/DL (ref 7–20)
CALCIUM SERPL-MCNC: 9.1 MG/DL (ref 8.3–10.6)
CHLORIDE SERPL-SCNC: 89 MMOL/L (ref 99–110)
CO2 SERPL-SCNC: 30 MMOL/L (ref 21–32)
CREAT SERPL-MCNC: 0.8 MG/DL (ref 0.6–1.2)
DEPRECATED RDW RBC AUTO: 14.3 % (ref 12.4–15.4)
EOSINOPHIL # BLD: 1.2 K/UL (ref 0–0.6)
EOSINOPHIL NFR BLD: 12 %
GFR SERPLBLD CREATININE-BSD FMLA CKD-EPI: >60 ML/MIN/{1.73_M2}
GLUCOSE SERPL-MCNC: 115 MG/DL (ref 70–99)
HCT VFR BLD AUTO: 35.9 % (ref 36–48)
HGB BLD-MCNC: 12.4 G/DL (ref 12–16)
LYMPHOCYTES # BLD: 1.1 K/UL (ref 1–5.1)
LYMPHOCYTES NFR BLD: 11 %
MCH RBC QN AUTO: 29.7 PG (ref 26–34)
MCHC RBC AUTO-ENTMCNC: 34.6 G/DL (ref 31–36)
MCV RBC AUTO: 85.8 FL (ref 80–100)
METAMYELOCYTES NFR BLD MANUAL: 1 %
MONOCYTES # BLD: 0.8 K/UL (ref 0–1.3)
MONOCYTES NFR BLD: 8 %
MYELOCYTES NFR BLD MANUAL: 1 %
NEUTROPHILS # BLD: 6.7 K/UL (ref 1.7–7.7)
NEUTROPHILS NFR BLD: 62 %
NEUTS BAND NFR BLD MANUAL: 5 % (ref 0–7)
PLATELET # BLD AUTO: 386 K/UL (ref 135–450)
PMV BLD AUTO: 7.8 FL (ref 5–10.5)
POTASSIUM SERPL-SCNC: 4.2 MMOL/L (ref 3.5–5.1)
RBC # BLD AUTO: 4.19 M/UL (ref 4–5.2)
RBC MORPH BLD: NORMAL
SODIUM SERPL-SCNC: 131 MMOL/L (ref 136–145)
WBC # BLD AUTO: 9.7 K/UL (ref 4–11)

## 2024-03-13 PROCEDURE — 85025 COMPLETE CBC W/AUTO DIFF WBC: CPT

## 2024-03-13 PROCEDURE — 6370000000 HC RX 637 (ALT 250 FOR IP): Performed by: INTERNAL MEDICINE

## 2024-03-13 PROCEDURE — 6360000002 HC RX W HCPCS: Performed by: INTERNAL MEDICINE

## 2024-03-13 PROCEDURE — 6370000000 HC RX 637 (ALT 250 FOR IP): Performed by: CLINICAL NURSE SPECIALIST

## 2024-03-13 PROCEDURE — 2700000000 HC OXYGEN THERAPY PER DAY

## 2024-03-13 PROCEDURE — 2580000003 HC RX 258: Performed by: INTERNAL MEDICINE

## 2024-03-13 PROCEDURE — 6370000000 HC RX 637 (ALT 250 FOR IP): Performed by: STUDENT IN AN ORGANIZED HEALTH CARE EDUCATION/TRAINING PROGRAM

## 2024-03-13 PROCEDURE — 80048 BASIC METABOLIC PNL TOTAL CA: CPT

## 2024-03-13 PROCEDURE — 6370000000 HC RX 637 (ALT 250 FOR IP): Performed by: NURSE PRACTITIONER

## 2024-03-13 PROCEDURE — 94761 N-INVAS EAR/PLS OXIMETRY MLT: CPT

## 2024-03-13 PROCEDURE — 99233 SBSQ HOSP IP/OBS HIGH 50: CPT | Performed by: INTERNAL MEDICINE

## 2024-03-13 PROCEDURE — 1200000000 HC SEMI PRIVATE

## 2024-03-13 RX ORDER — FUROSEMIDE 10 MG/ML
40 INJECTION INTRAMUSCULAR; INTRAVENOUS DAILY
Status: DISCONTINUED | OUTPATIENT
Start: 2024-03-14 | End: 2024-03-14

## 2024-03-13 RX ADMIN — CHOLECALCIFEROL TAB 25 MCG (1000 UNIT) 1000 UNITS: 25 TAB at 08:07

## 2024-03-13 RX ADMIN — DONEPEZIL HYDROCHLORIDE 20 MG: 5 TABLET, FILM COATED ORAL at 21:01

## 2024-03-13 RX ADMIN — APIXABAN 10 MG: 5 TABLET, FILM COATED ORAL at 08:07

## 2024-03-13 RX ADMIN — DIGOXIN 125 MCG: 125 TABLET ORAL at 08:07

## 2024-03-13 RX ADMIN — FUROSEMIDE 40 MG: 10 INJECTION, SOLUTION INTRAMUSCULAR; INTRAVENOUS at 08:08

## 2024-03-13 RX ADMIN — LEVOTHYROXINE SODIUM 100 MCG: 0.1 TABLET ORAL at 08:07

## 2024-03-13 RX ADMIN — APIXABAN 10 MG: 5 TABLET, FILM COATED ORAL at 21:01

## 2024-03-13 RX ADMIN — Medication 10 ML: at 21:02

## 2024-03-13 RX ADMIN — ASPIRIN 81 MG 81 MG: 81 TABLET ORAL at 08:07

## 2024-03-13 RX ADMIN — METOPROLOL TARTRATE 12.5 MG: 25 TABLET, FILM COATED ORAL at 08:07

## 2024-03-13 RX ADMIN — SPIRONOLACTONE 25 MG: 25 TABLET ORAL at 08:07

## 2024-03-13 RX ADMIN — Medication 10 ML: at 08:08

## 2024-03-13 RX ADMIN — METOPROLOL TARTRATE 12.5 MG: 25 TABLET, FILM COATED ORAL at 21:02

## 2024-03-13 ASSESSMENT — PAIN SCALES - GENERAL
PAINLEVEL_OUTOF10: 0

## 2024-03-13 NOTE — CARE COORDINATION
Discharge Planning Note:    Received VM from Heriberto at [facility] informing that he ran benefits and they are out of network with [facility].    Electronically signed by Tamy Snell RN on 3/13/2024 at 8:13 AM

## 2024-03-13 NOTE — PROGRESS NOTES
Date of Admission: 3/5/2024. Hospital Day: 9     Brief course: This 88 y.o. female with PMHx of hypothyroidism, dementia, pulmonary fibrosis 2/2 COVID pneumonia who presented with SOB and hypoxia.  Admitted for acute hypoxic respiratory failure, CHF, acute PE, A-fib RVR.      Interval history: Pt seen and examined today.  Overnight events noted, interval ancillary notes and labs reviewed.   O2 requirement improved; currently on 4 L HFNC satting on 95%; wean as tolerated keep sats over 92%  Afebrile overnight, WBCs WNL.  Serum sodium 131 this morning.  Lasix reduced to 40 mg daily  Up in bed; reported she is feeling better today; denied any fever, chills chest pain or palpitations            Assessment/Plan:    Acute on chronic hypoxic respiratory failure likely  2/2 underlying pulmonary fibrosis/PE /A-fib and CHF  CTA chest, shows bilateral advanced pulmonary fibrosis particularly at the bases with superimposed acute bilateral groundglass opacities and left lower lobe branch PE.  Pulmonology on board; recommended repeating CTA chest tomorrow if no significant clinical improvement   Continue Lasix, breathing treatment, inhalers, awaiting down airway as tolerated.  LTAC following    Acute Heart Failure volume; improved  BNP elevated to 1408 on admission; down to 821 with diuresis  Echo on 3/5/24  showed LVH, EF 60 to 65% with grade 1 DD and RVSP of 47  Cardiology on board; continue IV Lasix and spironolactone MWF  Monitor electrolyte closely while on IV diuresis and replace as needed.  Daily weights fluid and salt restriction.    A-fib RVR; Currently sinus rhythm   Cardiology on board; continue digoxin, metoprolol and Eliquis.  Monitor dig level   Not a candidate for amiodarone or ablation.  Monitor on telemetry    PE/DVT; CTPA with left lower lobe branch PE. Dopplers showed DVT in left popliteal and left posterior tibial vein.   Continue Eliquis    Hyponatremia; likely 2/2 fluid overload  Continue IV diuresis.

## 2024-03-13 NOTE — PROGRESS NOTES
Assessment complete, vitals obtained. NSR. Pt resting in bed. A/O, denies pain or sob at rest. Weaned to 10 L HFNC. Crackles heard in bases. Ab soft. + bowel sounds. Skin warm, dry. Nonpitting edema to feet. Pulses palpable. Pt assisted to reposition & reports comfortable. External catheter in place. Call light in reach. Safety precautions in place.

## 2024-03-13 NOTE — PROGRESS NOTES
Nutrition Note    RECOMMENDATIONS  PO Diet: Continue current diet   ONS: Modify to berry Ensure Clear BID  Nutrition Support: None     NUTRITION ASSESSMENT   Pt seen for LOS assessment. Pt with poor PO intake throughout admission, consuming less than 50% of meals. Pt reports she was \"never a big eater\" and \"eats what she likes.\" RN reports pt typically eats all of her fruit and soup, not much else. Upon review of weight hx, pt has lost 18 lbs (12%) over the past 3 months. Pt reports she has \"no idea\" why she has lost weight. Diet recall prior to admission is toast at breakfast, grilled cheese for lunch, meat and potato at dinner. PO intake is not enough to meet pt's nutrition needs. Significant muscle wasting and fat loss noted upon NFPE. Pt dislikes Ensure Plus High Protein, likes juice and is agreeable to trial Ensure Clear instead.     Nutrition Related Findings: Na+ 131. LBM 3/11. Non-pitting BLE edema.  Wounds: None  Nutrition Education:  Education not indicated   Nutrition Goals: PO intake 50% or greater, prior to discharge     MALNUTRITION ASSESSMENT   Chronic Illness  Malnutrition Status: Severe malnutrition  Findings of the 6 clinical characteristics of malnutrition:  Energy Intake:  75% or less estimated energy requirements for 1 month or longer  Weight Loss:  Greater than 7.5% over 3 months     Body Fat Loss:  Mild body fat loss Orbital, Triceps   Muscle Mass Loss:  Mild muscle mass loss Temples (temporalis), Hand (interosseous)  Fluid Accumulation:  No significant fluid accumulation     Strength:  Not Performed      NUTRITION DIAGNOSIS   Severe malnutrition related to inadequate protein-energy intake as evidenced by Criteria as identified in malnutrition assessment      CURRENT NUTRITION THERAPIES  ADULT DIET; Regular; No Added Salt (3-4 gm); 1500 ml  ADULT ORAL NUTRITION SUPPLEMENT; Breakfast, Lunch, Dinner; Standard High Calorie/High Protein Oral Supplement     PO Intake: 1-25%, 26-50%   PO

## 2024-03-13 NOTE — PROGRESS NOTES
[facility] Pulmonary/CCM Progress note      Admit Date: 3/5/2024    Chief Complaint: Shortness of breath    Subjective:     Interval History: Patient now transition to HFNC, now requiring 6 L.  Some tachypnea with mild increased work of breathing.  Did not use BiPAP last night.    Scheduled Meds:   [START ON 3/14/2024] furosemide  40 mg IntraVENous Daily    digoxin  125 mcg Oral Daily    spironolactone  25 mg Oral Once per day on Mon Wed Fri    apixaban  10 mg Oral BID    Followed by    [START ON 3/15/2024] apixaban  5 mg Oral BID    metoprolol tartrate  12.5 mg Oral BID    sodium chloride flush  5-40 mL IntraVENous 2 times per day    aspirin  81 mg Oral Daily    donepezil  20 mg Oral Nightly    levothyroxine  100 mcg Oral Daily    Vitamin D  1,000 Units Oral Daily     Continuous Infusions:   sodium chloride 10 mL/hr at 03/06/24 0201     PRN Meds:sodium chloride, perflutren lipid microspheres, albuterol, sodium chloride flush, sodium chloride, potassium chloride **OR** potassium alternative oral replacement **OR** potassium chloride, magnesium sulfate, ondansetron **OR** ondansetron, polyethylene glycol, acetaminophen **OR** acetaminophen, docusate sodium, ipratropium 0.5 mg-albuterol 2.5 mg    Review of Systems  Constitutional: negative for fatigue, fevers, malaise and weight loss  Ears, nose, mouth, throat: negative for ear drainage, epistaxis, hoarseness, nasal congestion, sore throat and voice change  Respiratory: negative except for cough, shortness of breath, and sputum  Cardiovascular: negative for chest pain, chest pressure/discomfort, irregular heart beat, lower extremity edema and palpitations  Gastrointestinal: negative for abdominal pain, constipation, diarrhea, jaundice, melena, odynophagia, reflux symptoms and vomiting  Hematologic/lymphatic: negative for bleeding, easy bruising, lymphadenopathy and petechiae  Musculoskeletal:negative for arthralgias, bone pain, muscle weakness, neck pain and stiff  reactive-appearing pretracheal  and precarinal lymph nodes similar to previous.  A calcified subcarinal lymph  node is noted.  The heart and pericardium demonstrate no acute abnormality.  There is no acute abnormality of the thoracic aorta.     Lungs/pleura: There appears to be a background mild emphysema and chronic  interstitial lung disease however interstitial prominence and degree of  patchy diffuse ground-glass opacity is increased compared to 02/12/2024.  No  consolidating infiltrate is identified.  There is no pleural effusion or  pneumothorax.  There are a few tiny scattered calcified granulomas.     Upper Abdomen: Limited images of the upper abdomen are noted for a  gallbladder filled with stones but otherwise unremarkable.  Calcified  granulomas are noted in the liver and spleen.     Soft Tissues/Bones: No acute bone or soft tissue abnormality.     IMPRESSION:  Nonobstructive left lower lobe pulmonary embolus.  No heart strain.  The main  pulmonary artery is normal in caliber.     Increased patchy diffuse ground-glass opacity and interstitial prominence on  a background of chronic interstitial disease and possible emphysema.  Findings most likely represent pulmonary edema.  Diffuse atypical/viral  pneumonia is not excluded.     Evidence of old granulomatous disease.    Problem List:     Acute hypoxic respiratory failure requiring Airvo  History of COVID-19 pneumonia with pulmonary fibrosis  Acute pulmonary edema  Acute PE  Assessment/Plan:     History of COVID-19 pulmonary fibrosis, now complicated by pulmonary edema and acute PE.    Reviewed all imaging available including CTA chest, shows bilateral advanced pulmonary fibrosis particularly at the bases with superimposed acute bilateral groundglass opacities and left lower lobe branch PE.    2D echo from 3/5 showed LVH, EF 60 to 65% with grade 1 diastolic dysfunction and RVSP of 47.  proBNP 800, blood pressure borderline-decrease Lasix dose to 40 mg

## 2024-03-13 NOTE — ADT AUTH CERT
Utilization Reviews       3/12     by Dawna Sarkar, RN   Last Updated by Dawna Sarkar, RN on 3/13/2024 1016     Review Status Created By   In Primary Dawna Sarkar RN       Review Type   --      Criteria Review   DATE:   3/12   CD8  Unit:     ICU     Relevant baselines: (lab values, vitals, o2 amount/delivery, etc.)  1.5L/NC baseline         Pertinent Updates:  remains in ICU-critically ill  30L heated high flow NC/ weaning 15L high flow NC  significant tachypnea high 30's, soft BP - mild resp distress, shallow breathing, bilat crackles  BNP trending back up 821 - lasix changed to IV and BID yesterday, giuliana MWF  CM working on LTAC referral        Vitals:   97.8       31, 35, 33, 35, 38, 36        82, 75, 100   98/46, 87/75, 94/53       93% on 30L heated high flow NC,   attempt wean 90% on 15L high flow NC        Abnl/Pertinent Labs/Radiology/Diagnostic Studies:     Wbc 9.4  Rbc 4.14  Hgb 12.2  Hct 35.7           Physical Exam:  Cardiovascular: Regular rate, rhythm  Respiratory:  mild resp distress, takes shallow breath , bilateral fine crackles   Gastrointestinal: Soft, non tender  Genitourinary: no suprapubic tenderness  Musculoskeletal: No edema           MD Consults/Assessments & Plans:  IM PN:  Assessment/Plan:     Acute on chronic hypoxic respiratory failure likely  2/2 underlying pulmonary fibrosis/PE /A-fib and CHF  CTA chest, shows bilateral advanced pulmonary fibrosis particularly at the bases with superimposed acute bilateral groundglass opacities and left lower lobe branch PE.  Pulmonology on board; recommended repeating CTA chest tomorrow if no significant clinical improvement   Continue Lasix, breathing treatment, inhalers, awaiting down airway as tolerated.  LTAC following     Acute Heart Failure volume improved  BNP elevated to 1408 on admission; down to 821 with diuresis  Echo on 3/5/24  showed LVH, EF 60 to 65% with grade 1 DD and RVSP of 47  Cardiology on board; continue  PN:  Assessment and Plan:      1.Paroxysmal Atrial Fibrillation  - Currently in NSR  - Continue metoprolol 12.5 mg BID. Start PO digoxin 125 mcg daily on Saturday following IV loading (completed this AM). Uptitrate BB as her BP tolerates to help her maintain sinus rhythm     - [id number] score:high; [id number] Vasc score and anticoagulation discussed. High risk for stroke and thromboembolism. Anticoagulation is recommended. Risk of bleeding was discussed.  ~ Continue lovenox BID. Switch to Eliquis 5 mg BID at d/c if no procedures needed                 - She has converted back to sinus rhythm on 3/6/24. Pt has paroxysmal AF, likely due to her acute respiratory issues. She was unable to tell that she was in afib. Poor candidate for EP procedures given age. Amiodarone is a poor option for rhythm control given hx of pulmonary fibrosis     2. Acute diastolic heart failure (NYHA Class IV)  - Appears decompensated              ~ EF 60% per echo  - Continue with BB. Diuresis with IV lasix              - Monitor I&Os, daily weights     3. Acute PE/DVT              - Continue AC     4. 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

## 2024-03-14 LAB
ANION GAP SERPL CALCULATED.3IONS-SCNC: 5 MMOL/L (ref 3–16)
BASOPHILS # BLD: 0 K/UL (ref 0–0.2)
BASOPHILS NFR BLD: 0 %
BUN SERPL-MCNC: 29 MG/DL (ref 7–20)
CALCIUM SERPL-MCNC: 9.1 MG/DL (ref 8.3–10.6)
CHLORIDE SERPL-SCNC: 90 MMOL/L (ref 99–110)
CO2 SERPL-SCNC: 31 MMOL/L (ref 21–32)
CREAT SERPL-MCNC: 0.8 MG/DL (ref 0.6–1.2)
DEPRECATED RDW RBC AUTO: 14.1 % (ref 12.4–15.4)
DIGOXIN SERPL-MCNC: 1.7 NG/ML (ref 0.8–2)
EOSINOPHIL # BLD: 0 K/UL (ref 0–0.6)
EOSINOPHIL NFR BLD: 0 %
GFR SERPLBLD CREATININE-BSD FMLA CKD-EPI: >60 ML/MIN/{1.73_M2}
GLUCOSE SERPL-MCNC: 115 MG/DL (ref 70–99)
HCT VFR BLD AUTO: 37.1 % (ref 36–48)
HGB BLD-MCNC: 12.2 G/DL (ref 12–16)
LYMPHOCYTES # BLD: 0.8 K/UL (ref 1–5.1)
LYMPHOCYTES NFR BLD: 8 %
MCH RBC QN AUTO: 28.2 PG (ref 26–34)
MCHC RBC AUTO-ENTMCNC: 32.7 G/DL (ref 31–36)
MCV RBC AUTO: 86.2 FL (ref 80–100)
MONOCYTES # BLD: 0.7 K/UL (ref 0–1.3)
MONOCYTES NFR BLD: 7 %
NEUTROPHILS # BLD: 8.5 K/UL (ref 1.7–7.7)
NEUTROPHILS NFR BLD: 85 %
NT-PROBNP SERPL-MCNC: 682 PG/ML (ref 0–449)
OSMOLALITY SERPL: 286 MOSM/KG (ref 280–301)
PLATELET # BLD AUTO: 458 K/UL (ref 135–450)
PLATELET BLD QL SMEAR: ABNORMAL
PMV BLD AUTO: 7.9 FL (ref 5–10.5)
POTASSIUM SERPL-SCNC: 4.2 MMOL/L (ref 3.5–5.1)
RBC # BLD AUTO: 4.31 M/UL (ref 4–5.2)
RBC MORPH BLD: NORMAL
SLIDE REVIEW: ABNORMAL
SODIUM SERPL-SCNC: 126 MMOL/L (ref 136–145)
SODIUM SERPL-SCNC: 126 MMOL/L (ref 136–145)
SODIUM SERPL-SCNC: 127 MMOL/L (ref 136–145)
TSH SERPL DL<=0.005 MIU/L-ACNC: 3.48 UIU/ML (ref 0.27–4.2)
URATE SERPL-MCNC: 7 MG/DL (ref 2.6–6)
WBC # BLD AUTO: 10 K/UL (ref 4–11)

## 2024-03-14 PROCEDURE — 83880 ASSAY OF NATRIURETIC PEPTIDE: CPT

## 2024-03-14 PROCEDURE — 36415 COLL VENOUS BLD VENIPUNCTURE: CPT

## 2024-03-14 PROCEDURE — 6370000000 HC RX 637 (ALT 250 FOR IP): Performed by: INTERNAL MEDICINE

## 2024-03-14 PROCEDURE — 84443 ASSAY THYROID STIM HORMONE: CPT

## 2024-03-14 PROCEDURE — 80048 BASIC METABOLIC PNL TOTAL CA: CPT

## 2024-03-14 PROCEDURE — 6370000000 HC RX 637 (ALT 250 FOR IP): Performed by: NURSE PRACTITIONER

## 2024-03-14 PROCEDURE — 2580000003 HC RX 258: Performed by: INTERNAL MEDICINE

## 2024-03-14 PROCEDURE — 6370000000 HC RX 637 (ALT 250 FOR IP): Performed by: STUDENT IN AN ORGANIZED HEALTH CARE EDUCATION/TRAINING PROGRAM

## 2024-03-14 PROCEDURE — 84550 ASSAY OF BLOOD/URIC ACID: CPT

## 2024-03-14 PROCEDURE — 97530 THERAPEUTIC ACTIVITIES: CPT

## 2024-03-14 PROCEDURE — 1200000000 HC SEMI PRIVATE

## 2024-03-14 PROCEDURE — 84295 ASSAY OF SERUM SODIUM: CPT

## 2024-03-14 PROCEDURE — 80162 ASSAY OF DIGOXIN TOTAL: CPT

## 2024-03-14 PROCEDURE — 85025 COMPLETE CBC W/AUTO DIFF WBC: CPT

## 2024-03-14 PROCEDURE — 83930 ASSAY OF BLOOD OSMOLALITY: CPT

## 2024-03-14 RX ADMIN — CHOLECALCIFEROL TAB 25 MCG (1000 UNIT) 1000 UNITS: 25 TAB at 11:06

## 2024-03-14 RX ADMIN — ASPIRIN 81 MG 81 MG: 81 TABLET ORAL at 11:06

## 2024-03-14 RX ADMIN — Medication 10 ML: at 21:00

## 2024-03-14 RX ADMIN — APIXABAN 10 MG: 5 TABLET, FILM COATED ORAL at 20:58

## 2024-03-14 RX ADMIN — LEVOTHYROXINE SODIUM 100 MCG: 0.1 TABLET ORAL at 11:06

## 2024-03-14 RX ADMIN — METOPROLOL TARTRATE 12.5 MG: 25 TABLET, FILM COATED ORAL at 21:01

## 2024-03-14 RX ADMIN — METOPROLOL TARTRATE 12.5 MG: 25 TABLET, FILM COATED ORAL at 11:06

## 2024-03-14 RX ADMIN — DIGOXIN 125 MCG: 125 TABLET ORAL at 11:06

## 2024-03-14 RX ADMIN — DONEPEZIL HYDROCHLORIDE 20 MG: 5 TABLET, FILM COATED ORAL at 20:58

## 2024-03-14 RX ADMIN — APIXABAN 10 MG: 5 TABLET, FILM COATED ORAL at 11:06

## 2024-03-14 RX ADMIN — Medication 10 ML: at 11:13

## 2024-03-14 ASSESSMENT — PAIN SCALES - GENERAL
PAINLEVEL_OUTOF10: 0
PAINLEVEL_OUTOF10: 0

## 2024-03-14 NOTE — PLAN OF CARE
Problem: ABCDS Injury Assessment  Goal: Absence of physical injury  Outcome: Progressing     Problem: Safety - Adult  Goal: Free from fall injury  Outcome: Progressing     Problem: Chronic Conditions and Co-morbidities  Goal: Patient's chronic conditions and co-morbidity symptoms are monitored and maintained or improved  Outcome: Progressing     Problem: Skin/Tissue Integrity  Goal: Absence of new skin breakdown  Description: 1.  Monitor for areas of redness and/or skin breakdown  2.  Assess vascular access sites hourly  3.  Every 4-6 hours minimum:  Change oxygen saturation probe site  4.  Every 4-6 hours:  If on nasal continuous positive airway pressure, respiratory therapy assess nares and determine need for appliance change or resting period.  Outcome: Progressing     Problem: Respiratory - Adult  Goal: Achieves optimal ventilation and oxygenation  Outcome: Progressing     Problem: Musculoskeletal - Adult  Goal: Return mobility to safest level of function  Outcome: Progressing  Goal: Return ADL status to a safe level of function  Outcome: Progressing     Problem: Nutrition Deficit:  Goal: Optimize nutritional status  Outcome: Progressing     Problem: Discharge Planning  Goal: Discharge to home or other facility with appropriate resources  Outcome: Progressing

## 2024-03-14 NOTE — PROGRESS NOTES
Date of Admission: 3/5/2024. Hospital Day: 10     Brief course: This 88 y.o. female with PMHx of hypothyroidism, dementia, pulmonary fibrosis 2/2 COVID pneumonia who presented with SOB and hypoxia.  Admitted for acute hypoxic respiratory failure, CHF, acute PE, A-fib RVR.      Interval history:   Pt seen and examined today.  Overnight events noted, interval ancillary notes and labs reviewed.  On 6 L HFNC satting around 93%; wean as tolerated keep sats over 92%. Afebrile overnight, WBCs WNL.  Serum sodium down to 126.  Lasix held.  Nephrology consulted  Up in bed; reported that she is feeling better.  Reported SOB on exertion but denied any fever, chills,, chest pain, nausea, vomiting or abdominal pain          Assessment/Plan:    Acute on chronic hypoxic respiratory failure; improving now down to 6 L HFNC   likely  2/2 underlying pulmonary fibrosis/PE /A-fib and CHF  CTA chest, shows bilateral advanced pulmonary fibrosis particularly at the bases with superimposed acute bilateral groundglass opacities and left lower lobe branch PE.  Pulmonology input appreciated. Continue prn breathing treatment, inhalers.   Wean as tolerated keep sats above 92%    Hyponatremia acute on chronic  Serum sodium down to 126 this morning; Lasix and Aldactone held  Nephrology consulted; serum osmole urine osmole, urine electrolytes, TSH and uric acid ordered  Maintain 1.5L per 24-hour fluid restrictions.  Monitor serum sodium level closely    Acute Heart Failure volume; improved  BNP elevated to 1408 on admission; down to 821 with diuresis  Echo on 3/5/24  showed LVH, EF 60 to 65% with grade 1 DD and RVSP of 47  Cardiology input appreciated.  Lasix and Aldactone held due to hyponatremia  Strict intake and output, daily weights fluid and salt restriction.    A-fib RVR; Currently sinus rhythm   Cardiology on board; continue digoxin, metoprolol and Eliquis.  Monitor dig level   Not a candidate for amiodarone or ablation.  Monitor on

## 2024-03-14 NOTE — PROGRESS NOTES
Pt up to chair, assisted back to bed. Sob w/ exertion, desat to 80s, increased to 12 L HFNC. Brief & external catheter changed, shanika care complete. Pt assisted to comfortable position. Assessment complete, vitals obtained. NSR. Pt A/O, denies pain or sob at rest. Some crackles still to bases. Ab soft, + bowel sounds. Skin warm, dry. Nonpitting edema to BLE. Peripheral pulses palpable. Due meds given, see MAR. Pt denies further needs. Call light in reach. Safety precautions in place.

## 2024-03-14 NOTE — PROGRESS NOTES
[facility] - Inpatient Rehabilitation Department   Phone: [phone]    Physical Therapy    [] Initial Evaluation            [x] Daily Treatment Note         [] Discharge Summary      Patient: Olive Mckeon   : 1935   MRN: [id number]   Date of Service:  3/14/2024  Admitting Diagnosis: Acute respiratory failure (HCC)  Current Admission Summary: She presented to the hospital in fe for pneumonia/covid related pulmonary fibrosis and discharged on 2 L of oxygen. She is now having increased shortness of breath and found to have sats in the 70s. Bnp 1408 she received some IV lasix. CT chest shows new LLL pulmonary embolism. Echo with normal LVEF and grade 1 diastolic dysfunction. She went into AF with RVR. Dopplers show acute LLE DVT with plan to switch Lovenox to therapeutic dose Lovenox    3/6 -  CT of chest   - Nonobstructive left lower lobe pulmonary embolus.     -Increased patchy diffuse ground-glass opacity and interstitial prominence on a background of chronic interstitial disease and possible emphysema.  Findings most likely represent pulmonary edema.  Diffuse atypical/viral pneumonia is not excluded.  Past Medical History:  has a past medical history of Adhesive capsulitis of shoulder, Hypothyroidism, On home O2, Osteoporosis, unspecified, Routine general medical examination at a health care facility, Seborrheic dermatitis, unspecified, and Unspecified disorder of skin and subcutaneous tissue.  Past Surgical History:  has a past surgical history that includes Cataract removal; Cataract removal; other surgical history; and Colonoscopy.  Discharge Recommendations: Olive Mckeon scored a 15/24 on the AM-PAC short mobility form. Current research shows that an AM-PAC score of 17 or less is typically not associated with a discharge to the patient's home setting. Based on the patient's AM-PAC score and their current functional mobility deficits, it is recommended that the patient have 3-5 sessions  Co-treatment   Time In 1515       Time Out 1608       Minutes 53         Timed Code Treatment Minutes:   53  Total Treatment Minutes:  53       Electronically Signed By: Travon Tipton, PT  Travon Tipton PT, DPT, [id number]

## 2024-03-14 NOTE — PROGRESS NOTES
Patient has arrived to unit in stable condition from the ICU. Vitals stable. Patient is awake, alert and oriented. Respirations are easy and unlabored. Patient does not appear to be in distress. Patient oriented to room and call light. Plan of care discussed with patient, patient agreeable. Call light within reach.

## 2024-03-14 NOTE — CONSULTS
!None      ! +------------------------+----------+---------------+----------+ !Dist Femoral            !Yes       !Yes            !None      ! +------------------------+----------+---------------+----------+ !Deep Femoral            !Yes       !Yes            !None      ! +------------------------+----------+---------------+----------+ !Popliteal               !Yes       !Yes            !None      ! +------------------------+----------+---------------+----------+ !GSV Below Knee          !Yes       !Yes            !None      ! +------------------------+----------+---------------+----------+ !Gastroc                 !Yes       !Yes            !None      ! +------------------------+----------+---------------+----------+ !Soleal                  !Yes       !Yes            !None      ! +------------------------+----------+---------------+----------+ !PTV                     !Yes       !Yes            !None      ! +------------------------+----------+---------------+----------+ !Peroneal                !Yes       !Yes            !None      ! +------------------------+----------+---------------+----------+ !GSV Calf                !Yes       !Yes            !None      ! +------------------------+----------+---------------+----------+ !SSV                     !Yes       !Yes            !None      ! +------------------------+----------+---------------+----------+ Right Doppler Measurements +------------------------+------+------+------------+ !Location                !Signal!Reflux!Reflux (sec)! +------------------------+------+------+------------+ !Sapheno Femoral Junction!Phasic!      !            ! +------------------------+------+------+------------+ !Common Femoral          !Phasic!      !            ! +------------------------+------+------+------------+ !Prox Femoral            !Phasic!      !            ! +------------------------+------+------+------------+ !Deep Femoral            !Phasic!      !            !

## 2024-03-15 LAB
ALBUMIN SERPL-MCNC: 2.8 G/DL (ref 3.4–5)
ANION GAP SERPL CALCULATED.3IONS-SCNC: 7 MMOL/L (ref 3–16)
BUN SERPL-MCNC: 26 MG/DL (ref 7–20)
CALCIUM SERPL-MCNC: 9 MG/DL (ref 8.3–10.6)
CHLORIDE SERPL-SCNC: 92 MMOL/L (ref 99–110)
CO2 SERPL-SCNC: 32 MMOL/L (ref 21–32)
CREAT SERPL-MCNC: 0.7 MG/DL (ref 0.6–1.2)
GFR SERPLBLD CREATININE-BSD FMLA CKD-EPI: >60 ML/MIN/{1.73_M2}
GLUCOSE SERPL-MCNC: 113 MG/DL (ref 70–99)
NT-PROBNP SERPL-MCNC: 745 PG/ML (ref 0–449)
PHOSPHATE SERPL-MCNC: 3.6 MG/DL (ref 2.5–4.9)
POTASSIUM SERPL-SCNC: 4.3 MMOL/L (ref 3.5–5.1)
SODIUM SERPL-SCNC: 131 MMOL/L (ref 136–145)

## 2024-03-15 PROCEDURE — 83880 ASSAY OF NATRIURETIC PEPTIDE: CPT

## 2024-03-15 PROCEDURE — 99233 SBSQ HOSP IP/OBS HIGH 50: CPT | Performed by: INTERNAL MEDICINE

## 2024-03-15 PROCEDURE — 36415 COLL VENOUS BLD VENIPUNCTURE: CPT

## 2024-03-15 PROCEDURE — 80069 RENAL FUNCTION PANEL: CPT

## 2024-03-15 PROCEDURE — 1200000000 HC SEMI PRIVATE

## 2024-03-15 PROCEDURE — 6370000000 HC RX 637 (ALT 250 FOR IP): Performed by: INTERNAL MEDICINE

## 2024-03-15 PROCEDURE — 2700000000 HC OXYGEN THERAPY PER DAY

## 2024-03-15 PROCEDURE — 2580000003 HC RX 258: Performed by: INTERNAL MEDICINE

## 2024-03-15 PROCEDURE — 6370000000 HC RX 637 (ALT 250 FOR IP): Performed by: NURSE PRACTITIONER

## 2024-03-15 PROCEDURE — 6370000000 HC RX 637 (ALT 250 FOR IP): Performed by: STUDENT IN AN ORGANIZED HEALTH CARE EDUCATION/TRAINING PROGRAM

## 2024-03-15 PROCEDURE — 94761 N-INVAS EAR/PLS OXIMETRY MLT: CPT

## 2024-03-15 RX ORDER — FUROSEMIDE 20 MG/1
20 TABLET ORAL DAILY
Status: DISCONTINUED | OUTPATIENT
Start: 2024-03-15 | End: 2024-03-18

## 2024-03-15 RX ADMIN — CHOLECALCIFEROL TAB 25 MCG (1000 UNIT) 1000 UNITS: 25 TAB at 09:10

## 2024-03-15 RX ADMIN — LEVOTHYROXINE SODIUM 100 MCG: 0.1 TABLET ORAL at 09:11

## 2024-03-15 RX ADMIN — FUROSEMIDE 20 MG: 20 TABLET ORAL at 13:01

## 2024-03-15 RX ADMIN — METOPROLOL TARTRATE 12.5 MG: 25 TABLET, FILM COATED ORAL at 20:26

## 2024-03-15 RX ADMIN — DONEPEZIL HYDROCHLORIDE 20 MG: 5 TABLET, FILM COATED ORAL at 20:26

## 2024-03-15 RX ADMIN — ASPIRIN 81 MG 81 MG: 81 TABLET ORAL at 09:11

## 2024-03-15 RX ADMIN — APIXABAN 5 MG: 5 TABLET, FILM COATED ORAL at 09:11

## 2024-03-15 RX ADMIN — Medication 10 ML: at 10:19

## 2024-03-15 RX ADMIN — APIXABAN 5 MG: 5 TABLET, FILM COATED ORAL at 20:26

## 2024-03-15 RX ADMIN — DIGOXIN 125 MCG: 125 TABLET ORAL at 09:11

## 2024-03-15 RX ADMIN — Medication 10 ML: at 20:30

## 2024-03-15 ASSESSMENT — PAIN SCALES - GENERAL
PAINLEVEL_OUTOF10: 0

## 2024-03-15 NOTE — PLAN OF CARE
Problem: ABCDS Injury Assessment  Goal: Absence of physical injury  3/15/2024 0404 by Anna Sargent RN  Outcome: Progressing  3/14/2024 1700 by Christina Castrejon RN  Outcome: Progressing     Problem: Safety - Adult  Goal: Free from fall injury  3/15/2024 0404 by Anna Sargent RN  Outcome: Progressing  3/14/2024 1700 by Christina Castrejon RN  Outcome: Progressing     Problem: Chronic Conditions and Co-morbidities  Goal: Patient's chronic conditions and co-morbidity symptoms are monitored and maintained or improved  3/15/2024 0404 by Anna Sargent RN  Outcome: Progressing  3/14/2024 1700 by Christina Castrejon RN  Outcome: Progressing     Problem: Skin/Tissue Integrity  Goal: Absence of new skin breakdown  Description: 1.  Monitor for areas of redness and/or skin breakdown  2.  Assess vascular access sites hourly  3.  Every 4-6 hours minimum:  Change oxygen saturation probe site  4.  Every 4-6 hours:  If on nasal continuous positive airway pressure, respiratory therapy assess nares and determine need for appliance change or resting period.  3/15/2024 0404 by Anna Sargent RN  Outcome: Progressing  3/14/2024 1700 by Christina Castrejon RN  Outcome: Progressing     Problem: Respiratory - Adult  Goal: Achieves optimal ventilation and oxygenation  3/15/2024 0404 by Anna Sargent RN  Outcome: Progressing  3/14/2024 1700 by Christina Castrejon RN  Outcome: Progressing     Problem: Musculoskeletal - Adult  Goal: Return mobility to safest level of function  3/15/2024 0404 by Anna Sargent RN  Outcome: Progressing  3/14/2024 1700 by Christina Castrejon RN  Outcome: Progressing  Goal: Return ADL status to a safe level of function  3/15/2024 0404 by Anna Sargent RN  Outcome: Progressing  3/14/2024 1700 by Christina Castrejon RN  Outcome: Progressing     Problem: Nutrition Deficit:  Goal: Optimize nutritional status  3/15/2024 0404 by Anna Sargent RN  Outcome: Progressing  3/14/2024 1700 by Christina Castrejon RN  Outcome:  Progressing     Problem: Discharge Planning  Goal: Discharge to home or other facility with appropriate resources  3/15/2024 0404 by Anna Sargent, RN  Outcome: Progressing  3/14/2024 1700 by Christina Castrejon, RN  Outcome: Progressing

## 2024-03-15 NOTE — PROGRESS NOTES
=======================================================================================     DATA:  Diagnostic tests reviewed by me for today's visit:   (AS NEEDED FOR MY EVALUATION AND MANAGEMENT).       Recent Labs     03/13/24 0431 03/14/24 0435   WBC 9.7 10.0   HCT 35.9* 37.1    458*       Iron Saturation:  No components found for: \"PERCENTFE\"  FERRITIN:  No results found for: \"FERRITIN\"  IRON:  No results found for: \"IRON\"  TIBC:  No results found for: \"TIBC\"    Recent Labs     03/13/24 0431 03/14/24 0435 03/14/24  1320 03/14/24  1912 03/15/24  0517   * 126* 127* 126* 131*   K 4.2 4.2  --   --  4.3   CL 89* 90*  --   --  92*   CO2 30 31  --   --  32   BUN 22* 29*  --   --  26*   CREATININE 0.8 0.8  --   --  0.7       Recent Labs     03/13/24  0431 03/14/24  0435 03/15/24  0517   CALCIUM 9.1 9.1 9.0   PHOS  --   --  3.6       No results for input(s): \"PH\", \"PCO2\", \"PO2\" in the last 72 hours.    Invalid input(s): \"V1XXUSMEVIYR\", \"INSPIREDO2\"    ABG:  No results found for: \"PH\", \"PCO2\", \"PO2\", \"HCO3\", \"BE\", \"THGB\", \"TCO2\", \"O2SAT\"  VBG:    Lab Results   Component Value Date/Time    PHVEN 7.371 03/05/2024 06:46 PM    DPA0RLN 46.1 03/05/2024 06:46 PM    BEVEN 0.9 03/05/2024 06:46 PM    H9RTNUVW 97 03/05/2024 06:46 PM       LDH:  No results found for: \"LDH\"  Uric Acid:    Lab Results   Component Value Date/Time    LABURIC 7.0 03/14/2024 04:35 AM       PT/INR:    Lab Results   Component Value Date/Time    PROTIME 13.5 02/12/2024 05:20 PM    INR 1.03 02/12/2024 05:20 PM     Warfarin PT/INR:  No components found for: \"PTPATWAR\", \"PTINRWAR\"  PTT:  No results found for: \"APTT\", \"PTT\"[APTT}  Last 3 Troponin:    Lab Results   Component Value Date/Time    TROPONINI <0.01 12/22/2020 04:44 PM       U/A:  No results found for: \"NITRITE\", \"COLORU\", \"PROTEINU\", \"PHUR\", \"LABCAST\", \"WBCUA\", \"RBCUA\", \"MUCUS\", \"TRICHOMONAS\", \"YEAST\", \"BACTERIA\", \"CLARITYU\", \"SPECGRAV\", \"LEUKOCYTESUR\", \"UROBILINOGEN\",  spondylosis     The mediastinum appears to widen which is new from the previous study.  This may be related to fluid overload             ======================================================================  Please note that this chart entry has been generated using voice recognition software, mainly.  So please excuse brevity and/or typos.  While every effort and attempts have been made to ensure the accuracy of this automated transcription, some errors may have occurred; and certain words and phrases in transcription may not be entered as intended.  However, inadvertent computerized transcription errors may be present.  So please contact us if any clarification needed.

## 2024-03-15 NOTE — PROGRESS NOTES
Patient BP low 95/57, message Dr. Muñiz about holding Metoprolol (Loppressor). Medication held per Dr. Albrecht.

## 2024-03-15 NOTE — PROGRESS NOTES
to software limitations. If there are questions or concerns about the content of this note of information contained within the body of this dictation they should be addressed with the provider for clarification.

## 2024-03-15 NOTE — PROGRESS NOTES
Incentive Spirometry education and demonstration completed by Respiratory Therapy Yes      Response to education: Fair     Teaching Time: 5 minutes    Minimum Predicted Vital Capacity - 639 mL.  Patient's Actual Vital Capacity - 250mL. Turning over to Nursing for routine follow-up No.    Electronically signed by BUSHRA MALCOLM RCP on 3/14/2024 at 8:14 PM

## 2024-03-15 NOTE — PROGRESS NOTES
Nutrition Note    RECOMMENDATIONS  PO Diet: Continue current diet  ONS: Discontinued current ONS as pt dislikes. Will order Magic Cup BID for trial.   Other: Please document % po intake of meals & ONS    NUTRITION ASSESSMENT   Pt triggered for follow-up. On regular, DWIGHT, 1500 mL fluid restricted diet with Ensure Clear ordered BID. One documented meal intake of 26-50% since last RD assessment. Upon visiting, pt reported she has been eating \"terribly\" stating she disliked breakfast and just wants to leave. Disliked Ensure Clear and does not wish to receive. RD will continue to monitor for adequate po intake.     Nutrition Related Findings: Na 131. LBM 3/11. Non-pitting BLE edema.  Wounds: None  Nutrition Education:  Education completed (Brief CHF diet education 3/11)   Nutrition Goals: PO intake 50% or greater, by next RD assessment     MALNUTRITION ASSESSMENT   Chronic Illness  Malnutrition Status: Severe malnutrition (as determined by initial RD assessment 3/13)   Findings of the 6 clinical characteristics of malnutrition:  Energy Intake:  75% or less estimated energy requirements for 1 month or longer  Weight Loss:  Greater than 7.5% over 3 months     Body Fat Loss:  Mild body fat loss Orbital, Triceps   Muscle Mass Loss:  Mild muscle mass loss Temples (temporalis), Hand (interosseous)  Fluid Accumulation:  No significant fluid accumulation     Strength:  Not Performed    NUTRITION DIAGNOSIS   Severe malnutrition, In context of chronic, non-illness related related to inadequate protein-energy intake as evidenced by Criteria as identified in malnutrition assessment    CURRENT NUTRITION THERAPIES  ADULT DIET; Regular; No Added Salt (3-4 gm); 1500 ml  ADULT ORAL NUTRITION SUPPLEMENT; Lunch, Dinner; Clear Liquid Oral Supplement     PO Intake: Unable to assess   PO Supplement Intake:Refusing to take    ANTHROPOMETRICS  Current Height: 172.7 cm (5' 8\")  Current Weight - Scale: 62.5 kg (137 lb 12.6 oz)    Admission

## 2024-03-15 NOTE — CARE COORDINATION
Discharge Planning:     (PARISH) spoke with the patients daughter about discharge plans. The daughter would like a referral sent to [facility]. The daughter does not want any other referrals sent at this time. CM spoke with her about the patients oxygen needs and her insurance which will limit her SNF choices. She verbalizes understanding. The consideration to take her home is still an option with the daughter. She does not want to consider a palliative consult at this time either.    PARISH spoke with Nancy/nestor Mccoy. She will review the referral and call me back.    Update: [facility] denied at this time based on her oxygen needs. The facility will continue to follow, if she oxygen needs improve [facility] could potentially accept. CM will update patient/family.    Electronically signed by Varun De Leon on 3/15/24 at 2:41 PM EDT

## 2024-03-15 NOTE — PROGRESS NOTES
Assessment complete. VSS. Patient resting in bed. Respirations unlabored. Pt had several episodes with oxygen falling low. Oxygen was raise from 6 liter to 10 liter. Patient is back down to 6 liters of oxygen. Call light in reach. Fall precautions in place. No needs expressed at this time. Will continue to monitor.

## 2024-03-15 NOTE — PROGRESS NOTES
in left popliteal and left posterior tibial vein.  Continue Eliquis    Hypothyroidism; Continue synthroid    Mild dementia; continue donezepil      DVT Prophylaxis:    Diet: ADULT DIET; Regular; No Added Salt (3-4 gm); 1500 ml  ADULT ORAL NUTRITION SUPPLEMENT; Lunch, Dinner; Clear Liquid Oral Supplement  Code Status: DNR-CCA      Dispo - home    All  follow up labs and imaging personally reviewed          Medications:  Reviewed    Infusion Medications    sodium chloride 10 mL/hr at 03/06/24 0201     Scheduled Medications    digoxin  125 mcg Oral Daily    apixaban  5 mg Oral BID    metoprolol tartrate  12.5 mg Oral BID    sodium chloride flush  5-40 mL IntraVENous 2 times per day    aspirin  81 mg Oral Daily    donepezil  20 mg Oral Nightly    levothyroxine  100 mcg Oral Daily    Vitamin D  1,000 Units Oral Daily     PRN Meds: sodium chloride, perflutren lipid microspheres, albuterol, sodium chloride flush, sodium chloride, potassium chloride **OR** potassium alternative oral replacement **OR** potassium chloride, magnesium sulfate, ondansetron **OR** ondansetron, polyethylene glycol, acetaminophen **OR** acetaminophen, docusate sodium, ipratropium 0.5 mg-albuterol 2.5 mg      Intake/Output Summary (Last 24 hours) at 3/15/2024 0852  Last data filed at 3/15/2024 0657  Gross per 24 hour   Intake --   Output 1300 ml   Net -1300 ml         Physical Exam Performed:    BP (!) 98/57   Pulse 83   Temp 97.2 °F (36.2 °C) (Axillary)   Resp 19   Ht 1.727 m (5' 8\")   Wt 62.5 kg (137 lb 12.6 oz)   SpO2 (!) 89%   BMI 20.95 kg/m²     General: NAD  Eyes: EOMI  ENT: neck supple  Cardiovascular: Regular rate, rhythm  Respiratory:  mild resp distress, takes shallow breath , bilateral fine crackles   Gastrointestinal: Soft, non tender  Genitourinary: no suprapubic tenderness  Musculoskeletal: No edema  Skin: warm, dry  Neuro: Alert.  Psych: Mood appropriate.        Labs:   Recent Labs     03/13/24  0431 03/14/24  0435   WBC 9.7

## 2024-03-15 NOTE — PROGRESS NOTES
Pt will make a good effort to use IS, but too weak to take a good breath, pt achieves about 500 out of the 639 set for a goal.

## 2024-03-16 ENCOUNTER — APPOINTMENT (OUTPATIENT)
Dept: CT IMAGING | Age: 89
DRG: 189 | End: 2024-03-16
Payer: COMMERCIAL

## 2024-03-16 LAB
ALBUMIN SERPL-MCNC: 2.7 G/DL (ref 3.4–5)
ANION GAP SERPL CALCULATED.3IONS-SCNC: 7 MMOL/L (ref 3–16)
BUN SERPL-MCNC: 20 MG/DL (ref 7–20)
CALCIUM SERPL-MCNC: 9.3 MG/DL (ref 8.3–10.6)
CHLORIDE SERPL-SCNC: 97 MMOL/L (ref 99–110)
CO2 SERPL-SCNC: 31 MMOL/L (ref 21–32)
CREAT SERPL-MCNC: 0.6 MG/DL (ref 0.6–1.2)
GFR SERPLBLD CREATININE-BSD FMLA CKD-EPI: >60 ML/MIN/{1.73_M2}
GLUCOSE SERPL-MCNC: 115 MG/DL (ref 70–99)
PHOSPHATE SERPL-MCNC: 3.8 MG/DL (ref 2.5–4.9)
POTASSIUM SERPL-SCNC: 4.5 MMOL/L (ref 3.5–5.1)
SODIUM SERPL-SCNC: 135 MMOL/L (ref 136–145)

## 2024-03-16 PROCEDURE — 36415 COLL VENOUS BLD VENIPUNCTURE: CPT

## 2024-03-16 PROCEDURE — 99232 SBSQ HOSP IP/OBS MODERATE 35: CPT | Performed by: INTERNAL MEDICINE

## 2024-03-16 PROCEDURE — 6370000000 HC RX 637 (ALT 250 FOR IP): Performed by: INTERNAL MEDICINE

## 2024-03-16 PROCEDURE — 71250 CT THORAX DX C-: CPT

## 2024-03-16 PROCEDURE — 6370000000 HC RX 637 (ALT 250 FOR IP): Performed by: NURSE PRACTITIONER

## 2024-03-16 PROCEDURE — 80069 RENAL FUNCTION PANEL: CPT

## 2024-03-16 PROCEDURE — 1200000000 HC SEMI PRIVATE

## 2024-03-16 PROCEDURE — 2580000003 HC RX 258: Performed by: INTERNAL MEDICINE

## 2024-03-16 PROCEDURE — 6370000000 HC RX 637 (ALT 250 FOR IP): Performed by: STUDENT IN AN ORGANIZED HEALTH CARE EDUCATION/TRAINING PROGRAM

## 2024-03-16 RX ADMIN — Medication 10 ML: at 09:42

## 2024-03-16 RX ADMIN — DONEPEZIL HYDROCHLORIDE 20 MG: 5 TABLET, FILM COATED ORAL at 20:35

## 2024-03-16 RX ADMIN — CHOLECALCIFEROL TAB 25 MCG (1000 UNIT) 1000 UNITS: 25 TAB at 09:38

## 2024-03-16 RX ADMIN — APIXABAN 5 MG: 5 TABLET, FILM COATED ORAL at 20:35

## 2024-03-16 RX ADMIN — DIGOXIN 125 MCG: 125 TABLET ORAL at 09:56

## 2024-03-16 RX ADMIN — APIXABAN 5 MG: 5 TABLET, FILM COATED ORAL at 09:38

## 2024-03-16 RX ADMIN — METOPROLOL TARTRATE 12.5 MG: 25 TABLET, FILM COATED ORAL at 09:57

## 2024-03-16 RX ADMIN — FUROSEMIDE 20 MG: 20 TABLET ORAL at 09:56

## 2024-03-16 RX ADMIN — LEVOTHYROXINE SODIUM 100 MCG: 0.1 TABLET ORAL at 09:37

## 2024-03-16 RX ADMIN — METOPROLOL TARTRATE 12.5 MG: 25 TABLET, FILM COATED ORAL at 20:35

## 2024-03-16 RX ADMIN — Medication 10 ML: at 20:38

## 2024-03-16 RX ADMIN — ASPIRIN 81 MG 81 MG: 81 TABLET ORAL at 09:43

## 2024-03-16 NOTE — PROGRESS NOTES
MD Jessica Mota MD Samir Brahmbhatt, MD               Office: [phone]                      Fax: [phone]             [facility]                   NEPHROLOGY INPATIENT PROGRESS NOTE:     PATIENT NAME: Olive Mckeon  : 1935   MRN: [id number]       IMPRESSION:       Admitted on  3/5/2024  for:  Acute respiratory failure (HCC) [J96.00]  Acute respiratory failure with hypoxia (HCC) [J96.01]  Increased oxygen demand [R68.89]       Hyponatremia :  improving     Acute on Chronic,   Moderate - severe range,   mildly symptomatic,      -  No Reported Severe symptoms (such as seizures, obtundation, coma, or respiratory arrest).   - no need for Hypertonic saline or acute reversal       - Risk factors for hyponatremia: Likely hypovolemia,  diuresis,    - Patient is at  risk of developing Osmotic Demyelination Syndrome.    - Call us urgently if any/worsening neurological findings.         RECOMMENDATIONS:     Work up:  - Serum Osm , UOsm , Benigno , Urine K, Uric acid,   -pending some of the labs  -Very low albumin, likely suggestive of her low solute intake  - other lytes stable, Cr 0.8   - BP not very low  , unlikely AI  - TSH-normal    Management:- Refer to orders   - Monitor Serum Na      - Goal Serum Na mid 130s, by next /24 hours     -stop Lasix  -stop Aldactone     -use PRN lasix    - Fluid restriction 1.5L /24 hrs.,  - Minimize use any hypotonic/isotonic fluids such as D5W, NS, LR with other medications/drips  ,   - Concentrate continuous drip fluids as much as possible,   - Avoid IVF  , unless needed for resuscitation/hypovolemia w/ hypotension+tachycardia,      - minimize SSRI, NSAIDs use  - Strict I/O with daily weights.  - Monitor neurological status closely  --- Call us urgently if any/worsening neurological findings. ** **       Discharge plans from renal standpoint-  improving   Whenever Na ~130s, stable w/o neurological s/s   - Resume Lasix 20 mg a  study..  Lungs are slightly under expanded.  Diffuse interstitial edema.  No consolidation..  No pleural effusions. Mild spondylosis     The mediastinum appears to widen which is new from the previous study.  This may be related to fluid overload             ======================================================================  Please note that this chart entry has been generated using voice recognition software, mainly.  So please excuse brevity and/or typos.  While every effort and attempts have been made to ensure the accuracy of this automated transcription, some errors may have occurred; and certain words and phrases in transcription may not be entered as intended.  However, inadvertent computerized transcription errors may be present.  So please contact us if any clarification needed.

## 2024-03-16 NOTE — PROGRESS NOTES
Date of Admission: 3/5/2024. Hospital Day: 12     Brief course: This 88 y.o. female with PMHx of hypothyroidism, dementia, pulmonary fibrosis 2/2 COVID pneumonia who presented with SOB and hypoxia.  Admitted for acute hypoxic respiratory failure, CHF, acute PE, A-fib RVR.      Interval history:   Pt seen and examined today.  Overnight events noted, interval ancillary notes and labs reviewed.   continues to require 6 L HFNC at rest and up to 10 L on exertion.  Up in bed; eating breakfast.  Stated that she had good night sleep.    Serum sodium improved to 135; restarted back on 20 mg p.o. Lasix  BP remained soft this morning; metoprolol held  Denied any fever, chills, SOB, chest pain, nausea, vomiting or abdominal pain        Assessment/Plan:    Acute on chronic hypoxic respiratory failure; improving now down to 6 L HFNC   likely  2/2 underlying pulmonary fibrosis/PE /A-fib and CHF  CTA chest, shows bilateral advanced pulmonary fibrosis particularly at the bases with superimposed acute bilateral groundglass opacities and left lower lobe branch PE.  Pulmonology input appreciated. Continue prn breathing treatment, inhalers.   Wean as tolerated keep sats above 92%    Hyponatremia acute on chronic  Serum sodium improved to 135; restarted on Lasix 20 mg daily.  Continue to hold Aldactone  Nephrology on board; appreciate their input  Maintain 1.5L per 24-hour fluid restrictions.  Monitor serum sodium level closely    Acute Heart Failure volume; improved  BNP elevated to 1408 on admission; down to 745 with diuresis  Echo on 3/5/24  showed LVH, EF 60 to 65% with grade 1 DD and RVSP of 47  Cardiology input appreciated.  Lasix and Aldactone held due to hyponatremia  Strict intake and output, daily weights fluid and salt restriction.    A-fib RVR; Currently sinus rhythm   Cardiology on board; on digoxin, metoprolol and Eliquis.  Monitor dig level   Not a candidate for amiodarone or ablation.  Monitor on telemetry    PE/DVT;         Labs:   Recent Labs     03/14/24 0435   WBC 10.0   HGB 12.2   HCT 37.1   *         Recent Labs     03/14/24  0435 03/14/24  1320 03/14/24  1912 03/15/24  0517 03/16/24  0529   *   < > 126* 131* 135*   K 4.2  --   --  4.3 4.5   CL 90*  --   --  92* 97*   CO2 31  --   --  32 31   BUN 29*  --   --  26* 20   CREATININE 0.8  --   --  0.7 0.6   CALCIUM 9.1  --   --  9.0 9.3   PHOS  --   --   --  3.6 3.8    < > = values in this interval not displayed.       No results for input(s): \"AST\", \"ALT\", \"BILIDIR\", \"BILITOT\", \"ALKPHOS\" in the last 72 hours.    No results for input(s): \"INR\" in the last 72 hours.  No results for input(s): \"CKTOTAL\", \"TROPHS\" in the last 72 hours.      Urinalysis:    No results found for: \"NITRU\", \"WBCUA\", \"BACTERIA\", \"RBCUA\", \"BLOODU\", \"SPECGRAV\", \"GLUCOSEU\"    Radiology:  XR CHEST PORTABLE   Final Result   1. Background of chronic interstitial pulmonary fibrosis with superimposed   diffuse reticular and ground-glass opacity, likely reflecting a combination   of moderate pulmonary edema and superimposed multifocal pneumonia.   2. Stable mild right hilar lymphadenopathy.   3. No significant interval change.         XR CHEST PORTABLE   Final Result   Progressive bilateral interstitial infiltrates.         VL Extremity Venous Bilateral   Final Result      CT CHEST PULMONARY EMBOLISM W CONTRAST   Final Result   Addendum (preliminary) 1 of 1   ADDENDUM:   Critical results were called by Dr. Bernardo Sinha to Shanita Almanza MD on   3/6/2024 at 01:23.         Final   Nonobstructive left lower lobe pulmonary embolus.  No heart strain.  The main   pulmonary artery is normal in caliber.      Increased patchy diffuse ground-glass opacity and interstitial prominence on   a background of chronic interstitial disease and possible emphysema.   Findings most likely represent pulmonary edema.  Diffuse atypical/viral   pneumonia is not excluded.      Evidence of old granulomatous disease.

## 2024-03-16 NOTE — PROGRESS NOTES
Shift assessment completed. Routine vitals obtained. Scheduled medications given. Patient is awake, alert and oriented. Respirations are easy and unlabored. Patient does not appear to be in distress. Call light within reach.

## 2024-03-16 NOTE — PROGRESS NOTES
Patient at rest had O2 sat of 95% on 5L. RN assisted patient to side of bed. O2 decreased to 76% on 5L. RN increased oxygen a few liters at a time until O2 sat up to 88% on 12L. Patient stood and pivoted with assistance from 2 RN to chair. O2 sat dropped to 79% while transferring. Patient settled in chair and recovered up to 93% on 12L at 1239.     1247: 96% on 9L HFNC.     1258: 96% on 6L HFNC

## 2024-03-16 NOTE — PLAN OF CARE
Problem: Safety - Adult  Goal: Free from fall injury  3/16/2024 1146 by Samantha Rubin, RN  Outcome: Progressing    Problem: Skin/Tissue Integrity  Goal: Absence of new skin breakdown  Description: 1.  Monitor for areas of redness and/or skin breakdown  2.  Assess vascular access sites hourly  3.  Every 4-6 hours minimum:  Change oxygen saturation probe site  4.  Every 4-6 hours:  If on nasal continuous positive airway pressure, respiratory therapy assess nares and determine need for appliance change or resting period.  3/16/2024 1146 by Samantha Rubin, RN  Outcome: Progressing

## 2024-03-16 NOTE — PROGRESS NOTES
[facility] Pulmonary/CCM Progress note      Admit Date: 3/5/2024    Chief Complaint: Shortness of breath    Subjective:     Interval History: Patient is now requiring 4 L O2.  Appears comfortable.  Mobilizing around the room.  Denies any chest pain or significant cough.    Scheduled Meds:   furosemide  20 mg Oral Daily    digoxin  125 mcg Oral Daily    apixaban  5 mg Oral BID    metoprolol tartrate  12.5 mg Oral BID    sodium chloride flush  5-40 mL IntraVENous 2 times per day    aspirin  81 mg Oral Daily    donepezil  20 mg Oral Nightly    levothyroxine  100 mcg Oral Daily    Vitamin D  1,000 Units Oral Daily     Continuous Infusions:   sodium chloride 10 mL/hr at 03/06/24 0201     PRN Meds:sodium chloride, perflutren lipid microspheres, albuterol, sodium chloride flush, sodium chloride, potassium chloride **OR** potassium alternative oral replacement **OR** potassium chloride, magnesium sulfate, ondansetron **OR** ondansetron, polyethylene glycol, acetaminophen **OR** acetaminophen, docusate sodium, ipratropium 0.5 mg-albuterol 2.5 mg    Review of Systems  Constitutional: negative for fatigue, fevers, malaise and weight loss  Ears, nose, mouth, throat: negative for ear drainage, epistaxis, hoarseness, nasal congestion, sore throat and voice change  Respiratory: negative except for cough, shortness of breath, and sputum  Cardiovascular: negative for chest pain, chest pressure/discomfort, irregular heart beat, lower extremity edema and palpitations  Gastrointestinal: negative for abdominal pain, constipation, diarrhea, jaundice, melena, odynophagia, reflux symptoms and vomiting  Hematologic/lymphatic: negative for bleeding, easy bruising, lymphadenopathy and petechiae  Musculoskeletal:negative for arthralgias, bone pain, muscle weakness, neck pain and stiff joints  Neurological: negative for dizziness, gait problems, headaches, seizures, speech problems, tremors and weakness  Behavioral/Psych: negative for anxiety,  03/16/2024 05:29 AM    BUN 20 03/16/2024 05:29 AM    CREATININE 0.6 03/16/2024 05:29 AM    CALCIUM 9.3 03/16/2024 05:29 AM     ABG:   Lab Results   Component Value Date/Time    BVR8HVZ 31.6 03/11/2024 10:55 AM    BEART 7.5 03/11/2024 10:55 AM    Y4FBWYEG 98.3 03/11/2024 10:55 AM    PHART 7.485 03/11/2024 10:55 AM    TPL5AGF 42.1 03/11/2024 10:55 AM    PO2ART 82.6 03/11/2024 10:55 AM    RHI5TLI 73.8 03/11/2024 10:55 AM       Radiology: All pertinent images / reports were reviewed as a part of this visit.    EXAMINATION:  CTA OF THE CHEST 3/5/2024 11:51 pm     TECHNIQUE:  CTA of the chest was performed after the administration of intravenous  contrast.  Multiplanar reformatted images are provided for review.  MIP  images are provided for review. Automated exposure control, iterative  reconstruction, and/or weight based adjustment of the mA/kV was utilized to  reduce the radiation dose to as low as reasonably achievable.     COMPARISON:  Portable chest obtained approximately 5 hours earlier.     CT PA dated 02/12/2024.     HISTORY:  ORDERING SYSTEM PROVIDED HISTORY: ACUTE RESP FAILURE  TECHNOLOGIST PROVIDED HISTORY:  Reason for exam:->ACUTE RESP FAILURE  Additional Contrast?->1     FINDINGS:  Pulmonary Arteries: Pulmonary arteries are adequately opacified for  evaluation.  At mid level branches to the mid and lateral aspects of the left  lower lobe base, there is prominent pulmonary embolus, which does not appear  to be entirely obstructing.  The pulmonary arterial tree otherwise appears to  be clear.  Main pulmonary artery is normal in caliber and there is no heart  strain.     Mediastinum: There are a few mildly enlarged reactive-appearing pretracheal  and precarinal lymph nodes similar to previous.  A calcified subcarinal lymph  node is noted.  The heart and pericardium demonstrate no acute abnormality.  There is no acute abnormality of the thoracic aorta.     Lungs/pleura: There appears to be a background mild

## 2024-03-17 LAB
ALBUMIN SERPL-MCNC: 2.7 G/DL (ref 3.4–5)
ANION GAP SERPL CALCULATED.3IONS-SCNC: 8 MMOL/L (ref 3–16)
BUN SERPL-MCNC: 16 MG/DL (ref 7–20)
CALCIUM SERPL-MCNC: 9.1 MG/DL (ref 8.3–10.6)
CHLORIDE SERPL-SCNC: 95 MMOL/L (ref 99–110)
CO2 SERPL-SCNC: 31 MMOL/L (ref 21–32)
CREAT SERPL-MCNC: 0.6 MG/DL (ref 0.6–1.2)
DIGOXIN SERPL-MCNC: 1.6 NG/ML (ref 0.8–2)
GFR SERPLBLD CREATININE-BSD FMLA CKD-EPI: >60 ML/MIN/{1.73_M2}
GLUCOSE SERPL-MCNC: 112 MG/DL (ref 70–99)
PHOSPHATE SERPL-MCNC: 3.3 MG/DL (ref 2.5–4.9)
POTASSIUM SERPL-SCNC: 5 MMOL/L (ref 3.5–5.1)
SODIUM SERPL-SCNC: 134 MMOL/L (ref 136–145)

## 2024-03-17 PROCEDURE — 36415 COLL VENOUS BLD VENIPUNCTURE: CPT

## 2024-03-17 PROCEDURE — 97530 THERAPEUTIC ACTIVITIES: CPT

## 2024-03-17 PROCEDURE — 1200000000 HC SEMI PRIVATE

## 2024-03-17 PROCEDURE — 80162 ASSAY OF DIGOXIN TOTAL: CPT

## 2024-03-17 PROCEDURE — 94680 O2 UPTK RST&XERS DIR SIMPLE: CPT

## 2024-03-17 PROCEDURE — 2580000003 HC RX 258: Performed by: INTERNAL MEDICINE

## 2024-03-17 PROCEDURE — 6370000000 HC RX 637 (ALT 250 FOR IP): Performed by: STUDENT IN AN ORGANIZED HEALTH CARE EDUCATION/TRAINING PROGRAM

## 2024-03-17 PROCEDURE — 2700000000 HC OXYGEN THERAPY PER DAY

## 2024-03-17 PROCEDURE — 94761 N-INVAS EAR/PLS OXIMETRY MLT: CPT

## 2024-03-17 PROCEDURE — 6370000000 HC RX 637 (ALT 250 FOR IP): Performed by: INTERNAL MEDICINE

## 2024-03-17 PROCEDURE — 99232 SBSQ HOSP IP/OBS MODERATE 35: CPT | Performed by: INTERNAL MEDICINE

## 2024-03-17 PROCEDURE — 6370000000 HC RX 637 (ALT 250 FOR IP): Performed by: NURSE PRACTITIONER

## 2024-03-17 PROCEDURE — 94760 N-INVAS EAR/PLS OXIMETRY 1: CPT

## 2024-03-17 PROCEDURE — 80069 RENAL FUNCTION PANEL: CPT

## 2024-03-17 RX ADMIN — METOPROLOL TARTRATE 12.5 MG: 25 TABLET, FILM COATED ORAL at 20:14

## 2024-03-17 RX ADMIN — Medication 10 ML: at 20:16

## 2024-03-17 RX ADMIN — Medication 10 ML: at 09:20

## 2024-03-17 RX ADMIN — ASPIRIN 81 MG 81 MG: 81 TABLET ORAL at 09:20

## 2024-03-17 RX ADMIN — FUROSEMIDE 20 MG: 20 TABLET ORAL at 09:20

## 2024-03-17 RX ADMIN — DIGOXIN 125 MCG: 125 TABLET ORAL at 09:20

## 2024-03-17 RX ADMIN — LEVOTHYROXINE SODIUM 100 MCG: 0.1 TABLET ORAL at 09:20

## 2024-03-17 RX ADMIN — CHOLECALCIFEROL TAB 25 MCG (1000 UNIT) 1000 UNITS: 25 TAB at 09:20

## 2024-03-17 RX ADMIN — APIXABAN 5 MG: 5 TABLET, FILM COATED ORAL at 20:14

## 2024-03-17 RX ADMIN — APIXABAN 5 MG: 5 TABLET, FILM COATED ORAL at 09:22

## 2024-03-17 RX ADMIN — METOPROLOL TARTRATE 12.5 MG: 25 TABLET, FILM COATED ORAL at 09:20

## 2024-03-17 RX ADMIN — DONEPEZIL HYDROCHLORIDE 20 MG: 5 TABLET, FILM COATED ORAL at 20:15

## 2024-03-17 ASSESSMENT — PAIN SCALES - GENERAL: PAINLEVEL_OUTOF10: 0

## 2024-03-17 NOTE — PROGRESS NOTES
Shift assessment complete. Routine Vitals obtained and stable, Meds given as ordered per MAR, Patient resting in bed. Respirations unlabored. Call light in reach. Fall precautions in place. No needs expressed at this time. Will continue to monitor.

## 2024-03-17 NOTE — PLAN OF CARE
Problem: ABCDS Injury Assessment  Goal: Absence of physical injury  Outcome: Progressing     Problem: Safety - Adult  Goal: Free from fall injury  3/16/2024 2230 by Rhett Ivy RN  Outcome: Progressing    Problem: Chronic Conditions and Co-morbidities  Goal: Patient's chronic conditions and co-morbidity symptoms are monitored and maintained or improved  Outcome: Progressing     Problem: Skin/Tissue Integrity  Goal: Absence of new skin breakdown  Description: 1.  Monitor for areas of redness and/or skin breakdown  2.  Assess vascular access sites hourly  3.  Every 4-6 hours minimum:  Change oxygen saturation probe site  4.  Every 4-6 hours:  If on nasal continuous positive airway pressure, respiratory therapy assess nares and determine need for appliance change or resting period.  3/16/2024 2230 by Rhett Ivy RN  Outcome: Progressing  Problem: Respiratory - Adult  Goal: Achieves optimal ventilation and oxygenation  Outcome: Progressing     Problem: Musculoskeletal - Adult  Goal: Return mobility to safest level of function  Outcome: Progressing  Goal: Return ADL status to a safe level of function  Outcome: Progressing     Problem: Nutrition Deficit:  Goal: Optimize nutritional status  3/16/2024 2230 by Rhett Ivy RN  Outcome: Progressing     Problem: Discharge Planning  Goal: Discharge to home or other facility with appropriate resources  Outcome: Progressing

## 2024-03-17 NOTE — PROGRESS NOTES
MD Jessica Mota MD Samir Brahmbhatt, MD               Office: [phone]                      Fax: [phone]             [facility] [facility]                   NEPHROLOGY INPATIENT PROGRESS NOTE:     PATIENT NAME: Olive Mckeon  : 1935   MRN: [id number]       IMPRESSION:       Admitted on  3/5/2024  for:  Acute respiratory failure (HCC) [J96.00]  Acute respiratory failure with hypoxia (HCC) [J96.01]  Increased oxygen demand [R68.89]       Hyponatremia :  improving     Acute on Chronic,   Moderate - severe range,   mildly symptomatic,    -  No Reported Severe symptoms (such as seizures, obtundation, coma, or respiratory arrest).   - no need for Hypertonic saline or acute reversal     - Risk factors for hyponatremia: Likely hypovolemia,  diuresis,  - Patient is at  risk of developing Osmotic Demyelination Syndrome.    - Call us urgently if any/worsening neurological findings.     Work up:  - Serum Osm , UOsm , Benigno , Urine K, Uric acid,   -pending some of the labs  -Very low albumin, likely suggestive of her low solute intake  - other lytes stable, Cr 0.8   - BP not very low  , unlikely AI  - TSH-normal        RECOMMENDATIONS:     Management:- Refer to orders   - Monitor Serum Na      - Goal Serum Na mid 130s, by next /24 hours     -resumed Lasix  -stop Aldactone      - Fluid restriction 1.5L /24 hrs.,  - Minimize use any hypotonic/isotonic fluids such as D5W, NS, LR with other medications/drips  ,   - Concentrate continuous drip fluids as much as possible,   - Avoid IVF  , unless needed for resuscitation/hypovolemia w/ hypotension+tachycardia,      - minimize SSRI, NSAIDs use  - Strict I/O with daily weights.  - Monitor neurological status closely  --- Call us urgently if any/worsening neurological findings. ** **       Discharge plans from renal standpoint-     I will sign off, as has been stable for past few days from my standpoint  On discharge-  - Resumed  reactive-appearing pretracheal and precarinal lymph nodes similar to previous.  A calcified subcarinal lymph node is noted.  The heart and pericardium demonstrate no acute abnormality. There is no acute abnormality of the thoracic aorta. Lungs/pleura: There appears to be a background mild emphysema and chronic interstitial lung disease however interstitial prominence and degree of patchy diffuse ground-glass opacity is increased compared to 02/12/2024.  No consolidating infiltrate is identified.  There is no pleural effusion or pneumothorax.  There are a few tiny scattered calcified granulomas. Upper Abdomen: Limited images of the upper abdomen are noted for a gallbladder filled with stones but otherwise unremarkable.  Calcified granulomas are noted in the liver and spleen. Soft Tissues/Bones: No acute bone or soft tissue abnormality.     Nonobstructive left lower lobe pulmonary embolus.  No heart strain.  The main pulmonary artery is normal in caliber. Increased patchy diffuse ground-glass opacity and interstitial prominence on a background of chronic interstitial disease and possible emphysema. Findings most likely represent pulmonary edema.  Diffuse atypical/viral pneumonia is not excluded. Evidence of old granulomatous disease.     XR CHEST PORTABLE    Result Date: 3/5/2024  EXAMINATION: ONE XRAY VIEW OF THE CHEST 3/5/2024 6:04 pm COMPARISON: 02/12/2024 HISTORY: ORDERING SYSTEM PROVIDED HISTORY: SOB TECHNOLOGIST PROVIDED HISTORY: Reason for exam:->SOB FINDINGS: Heart size is mildly enlarged aorta is tortuous in the mediastinum appears to be widened.  This is new from the previous study..  Lungs are slightly under expanded.  Diffuse interstitial edema.  No consolidation..  No pleural effusions. Mild spondylosis     The mediastinum appears to widen which is new from the previous study.  This may be related to fluid overload             ======================================================================  Please

## 2024-03-17 NOTE — PLAN OF CARE
Problem: Safety - Adult  Goal: Free from fall injury  3/17/2024 1037 by Samantha Rubin, RN  Outcome: Progressing     Problem: Skin/Tissue Integrity  Goal: Absence of new skin breakdown  Description: 1.  Monitor for areas of redness and/or skin breakdown  2.  Assess vascular access sites hourly  3.  Every 4-6 hours minimum:  Change oxygen saturation probe site  4.  Every 4-6 hours:  If on nasal continuous positive airway pressure, respiratory therapy assess nares and determine need for appliance change or resting period.  3/17/2024 1037 by Samantha Rubin, RN  Outcome: Progressing

## 2024-03-17 NOTE — PROGRESS NOTES
Patient son educated on next steps in patient's POC. Educated on what a LTAC is and why patient's oxygen needs are indictative of LTAC vs SNF. Son verbalized understanding.

## 2024-03-17 NOTE — PROGRESS NOTES
[facility] Pulmonary/CCM Progress note      Admit Date: 3/5/2024    Chief Complaint: Shortness of breath    Subjective:     Interval History: Today she is on 6 L O2.  Apparently requires up to 12 L with activity.  Appears comfortable at rest.  Denies any significant chest pain or cough.    Scheduled Meds:   furosemide  20 mg Oral Daily    digoxin  125 mcg Oral Daily    apixaban  5 mg Oral BID    metoprolol tartrate  12.5 mg Oral BID    sodium chloride flush  5-40 mL IntraVENous 2 times per day    aspirin  81 mg Oral Daily    donepezil  20 mg Oral Nightly    levothyroxine  100 mcg Oral Daily    Vitamin D  1,000 Units Oral Daily     Continuous Infusions:   sodium chloride 10 mL/hr at 03/06/24 0201     PRN Meds:sodium chloride, perflutren lipid microspheres, albuterol, sodium chloride flush, sodium chloride, potassium chloride **OR** potassium alternative oral replacement **OR** potassium chloride, magnesium sulfate, ondansetron **OR** ondansetron, polyethylene glycol, acetaminophen **OR** acetaminophen, docusate sodium, ipratropium 0.5 mg-albuterol 2.5 mg    Review of Systems  Constitutional: negative for fatigue, fevers, malaise and weight loss  Ears, nose, mouth, throat: negative for ear drainage, epistaxis, hoarseness, nasal congestion, sore throat and voice change  Respiratory: negative except for cough, shortness of breath, and sputum  Cardiovascular: negative for chest pain, chest pressure/discomfort, irregular heart beat, lower extremity edema and palpitations  Gastrointestinal: negative for abdominal pain, constipation, diarrhea, jaundice, melena, odynophagia, reflux symptoms and vomiting  Hematologic/lymphatic: negative for bleeding, easy bruising, lymphadenopathy and petechiae  Musculoskeletal:negative for arthralgias, bone pain, muscle weakness, neck pain and stiff joints  Neurological: negative for dizziness, gait problems, headaches, seizures, speech problems, tremors and weakness  Behavioral/Psych: negative for

## 2024-03-17 NOTE — PROGRESS NOTES
Home oxygen evaluation attempted. Patient SpO2 dropped to 83% on 15 Lpm high flow nasal cannula with standing up from chair and standing for less than 1 minute.

## 2024-03-17 NOTE — PROGRESS NOTES
Occupational Therapy    Received call from nurse, Eugenia, for assistance with mobilizing for O2 evaluation with RT.  Patient eating sandwich on arrival. Agreeable to 02 evaluation with encouragement.   Patient required mod A Of 2 for initial stand from chair. Min A to maintain balance. Standing tolerance limited to about 1 minute with 02 saturation dropping to 83% on 15L high flow.   Min A to return to chair. Required over 2 minutes on 15L 02 to return to 02 saturation in the 90s.    See last OT note for further information regarding pt's functional status and goals for therapy.  Patient in chair with RN and RT at bedside at therapist exit.     In time: 1209  Out time: 1220  Total times minutes 11  Total treatment minutes 11 Jan ASIA Kebede OTR/L [id number], 3/17/2024, 1:11 PM

## 2024-03-17 NOTE — PROGRESS NOTES
Date of Admission: 3/5/2024. Hospital Day: 13     Brief course: This 88 y.o. female with PMHx of hypothyroidism, dementia, pulmonary fibrosis 2/2 COVID pneumonia who presented with SOB and hypoxia.  Admitted for acute hypoxic respiratory failure, CHF, acute PE, A-fib RVR.      Interval history:   Pt seen and examined today.  Overnight events noted, interval ancillary notes and labs reviewed.   Remains on 6 L HFNC at rest.  For respiratory eval, pt required up to 15 L on standing up from chair  Diuresing with IV Lasix; monitor electrolytes closely while on diuresis and replace as needed  Up in chair; denied any fever, chills,chest pain, palpitations nausea, vomiting or abdominal pain          Assessment/Plan:    Acute on chronic hypoxic respiratory failure; requiring 6 L at rest and up to 12 to 15 L with activity   likely  2/2 underlying pulmonary fibrosis/PE /A-fib and CHF  CTA chest, shows bilateral advanced pulmonary fibrosis particularly at the bases with superimposed acute bilateral groundglass opacities and left lower lobe branch PE.  Echo with LVH, EF 60 to 65%, grade 1 DD and RVSP of 47. proBNP 1408 on admission; down to 745  Pulmonology input appreciated. Continue IV diuresis, prn breathing treatment, inhalers.    No further need for steroids; Wean as tolerated keep sats above 92%  Patient will benefit from LTAC facility due to such high O2 requirement per pulmonology recs    Hyponatremia acute on chronic; improved  Serum sodium 134 this morning; restarted on Lasix 20 mg daily.  Continue to hold Aldactone  Nephrology on board; appreciate their input  Maintain 1.5L per 24-hour fluid restrictions.  Monitor serum sodium level closely    Acute Heart Failure volume; improved  BNP elevated to 1408 on admission; down to 745 with diuresis  Echo on 3/5/24  showed LVH, EF 60 to 65% with grade 1 DD and RVSP of 47  Cardiology input appreciated.  Holding Aldactone due to hyponatremia and hypertension  Strict intake  rate, rhythm  Respiratory:  mild resp distress, takes shallow breath , bilateral fine crackles   Gastrointestinal: Soft, non tender  Genitourinary: no suprapubic tenderness  Musculoskeletal: No edema  Skin: warm, dry  Neuro: Alert.  Psych: Mood appropriate.        Labs:   No results for input(s): \"WBC\", \"HGB\", \"HCT\", \"PLT\" in the last 72 hours.      Recent Labs     03/15/24  0517 03/16/24  0529 03/17/24  0601   * 135* 134*   K 4.3 4.5 5.0   CL 92* 97* 95*   CO2 32 31 31   BUN 26* 20 16   CREATININE 0.7 0.6 0.6   CALCIUM 9.0 9.3 9.1   PHOS 3.6 3.8 3.3       No results for input(s): \"AST\", \"ALT\", \"BILIDIR\", \"BILITOT\", \"ALKPHOS\" in the last 72 hours.    No results for input(s): \"INR\" in the last 72 hours.  No results for input(s): \"CKTOTAL\", \"TROPHS\" in the last 72 hours.      Urinalysis:    No results found for: \"NITRU\", \"WBCUA\", \"BACTERIA\", \"RBCUA\", \"BLOODU\", \"SPECGRAV\", \"GLUCOSEU\"    Radiology:  CT CHEST WO CONTRAST   Final Result   1. Basal predominant pulmonary fibrosis with mild associated bronchiectasis   similar to multiple prior studies.  The new patchy ground-glass opacities   noted on the most recent comparison persistent are most prominent the upper   lobes where they appear more confluent in some areas.  There is cardiomegaly,   but there is no vascular congestion or septal thickening to suggest that this   is related to edema, so pneumonia is favored.  Progression of interstitial   lung disease is considered less likely.  Dependent confluent opacity in the   left lower lobe is stable and could be related atelectasis or pneumonia.   2. Stable precarinal lymphadenopathy which is likely reactive.   3. Small hiatal hernia.         XR CHEST PORTABLE   Final Result   1. Background of chronic interstitial pulmonary fibrosis with superimposed   diffuse reticular and ground-glass opacity, likely reflecting a combination   of moderate pulmonary edema and superimposed multifocal pneumonia.   2. Stable mild

## 2024-03-18 LAB
ALBUMIN SERPL-MCNC: 2.9 G/DL (ref 3.4–5)
ANION GAP SERPL CALCULATED.3IONS-SCNC: 7 MMOL/L (ref 3–16)
BASOPHILS # BLD: 0 K/UL (ref 0–0.2)
BASOPHILS NFR BLD: 0 %
BUN SERPL-MCNC: 13 MG/DL (ref 7–20)
CALCIUM SERPL-MCNC: 9.1 MG/DL (ref 8.3–10.6)
CHLORIDE SERPL-SCNC: 91 MMOL/L (ref 99–110)
CO2 SERPL-SCNC: 34 MMOL/L (ref 21–32)
CREAT SERPL-MCNC: 0.6 MG/DL (ref 0.6–1.2)
DEPRECATED RDW RBC AUTO: 14.6 % (ref 12.4–15.4)
EOSINOPHIL # BLD: 1.2 K/UL (ref 0–0.6)
EOSINOPHIL NFR BLD: 10 %
GFR SERPLBLD CREATININE-BSD FMLA CKD-EPI: >60 ML/MIN/{1.73_M2}
GLUCOSE SERPL-MCNC: 112 MG/DL (ref 70–99)
HCT VFR BLD AUTO: 36.5 % (ref 36–48)
HGB BLD-MCNC: 12.1 G/DL (ref 12–16)
LYMPHOCYTES # BLD: 2.3 K/UL (ref 1–5.1)
LYMPHOCYTES NFR BLD: 20 %
MCH RBC QN AUTO: 28.7 PG (ref 26–34)
MCHC RBC AUTO-ENTMCNC: 33.2 G/DL (ref 31–36)
MCV RBC AUTO: 86.5 FL (ref 80–100)
MONOCYTES # BLD: 0.7 K/UL (ref 0–1.3)
MONOCYTES NFR BLD: 6 %
MYELOCYTES NFR BLD MANUAL: 1 %
NEUTROPHILS # BLD: 7.4 K/UL (ref 1.7–7.7)
NEUTROPHILS NFR BLD: 62 %
NEUTS BAND NFR BLD MANUAL: 1 % (ref 0–7)
NT-PROBNP SERPL-MCNC: 921 PG/ML (ref 0–449)
PHOSPHATE SERPL-MCNC: 3.7 MG/DL (ref 2.5–4.9)
PLATELET # BLD AUTO: 505 K/UL (ref 135–450)
PLATELET BLD QL SMEAR: ABNORMAL
PMV BLD AUTO: 7.5 FL (ref 5–10.5)
POTASSIUM SERPL-SCNC: 4.5 MMOL/L (ref 3.5–5.1)
RBC # BLD AUTO: 4.21 M/UL (ref 4–5.2)
RBC MORPH BLD: NORMAL
SLIDE REVIEW: ABNORMAL
SODIUM SERPL-SCNC: 132 MMOL/L (ref 136–145)
WBC # BLD AUTO: 11.5 K/UL (ref 4–11)

## 2024-03-18 PROCEDURE — 85025 COMPLETE CBC W/AUTO DIFF WBC: CPT

## 2024-03-18 PROCEDURE — 83880 ASSAY OF NATRIURETIC PEPTIDE: CPT

## 2024-03-18 PROCEDURE — 6370000000 HC RX 637 (ALT 250 FOR IP): Performed by: NURSE PRACTITIONER

## 2024-03-18 PROCEDURE — 6370000000 HC RX 637 (ALT 250 FOR IP): Performed by: INTERNAL MEDICINE

## 2024-03-18 PROCEDURE — 97535 SELF CARE MNGMENT TRAINING: CPT

## 2024-03-18 PROCEDURE — 97530 THERAPEUTIC ACTIVITIES: CPT

## 2024-03-18 PROCEDURE — 36415 COLL VENOUS BLD VENIPUNCTURE: CPT

## 2024-03-18 PROCEDURE — 99233 SBSQ HOSP IP/OBS HIGH 50: CPT | Performed by: INTERNAL MEDICINE

## 2024-03-18 PROCEDURE — 6370000000 HC RX 637 (ALT 250 FOR IP): Performed by: STUDENT IN AN ORGANIZED HEALTH CARE EDUCATION/TRAINING PROGRAM

## 2024-03-18 PROCEDURE — 1200000000 HC SEMI PRIVATE

## 2024-03-18 PROCEDURE — 2580000003 HC RX 258: Performed by: INTERNAL MEDICINE

## 2024-03-18 PROCEDURE — 6360000002 HC RX W HCPCS: Performed by: INTERNAL MEDICINE

## 2024-03-18 PROCEDURE — 80069 RENAL FUNCTION PANEL: CPT

## 2024-03-18 RX ORDER — FUROSEMIDE 10 MG/ML
40 INJECTION INTRAMUSCULAR; INTRAVENOUS 2 TIMES DAILY
Status: DISCONTINUED | OUTPATIENT
Start: 2024-03-18 | End: 2024-03-20 | Stop reason: HOSPADM

## 2024-03-18 RX ADMIN — DIGOXIN 125 MCG: 125 TABLET ORAL at 08:03

## 2024-03-18 RX ADMIN — CHOLECALCIFEROL TAB 25 MCG (1000 UNIT) 1000 UNITS: 25 TAB at 08:03

## 2024-03-18 RX ADMIN — Medication 10 ML: at 20:32

## 2024-03-18 RX ADMIN — ASPIRIN 81 MG 81 MG: 81 TABLET ORAL at 08:04

## 2024-03-18 RX ADMIN — APIXABAN 5 MG: 5 TABLET, FILM COATED ORAL at 08:03

## 2024-03-18 RX ADMIN — METOPROLOL TARTRATE 12.5 MG: 25 TABLET, FILM COATED ORAL at 20:32

## 2024-03-18 RX ADMIN — DONEPEZIL HYDROCHLORIDE 20 MG: 5 TABLET, FILM COATED ORAL at 20:32

## 2024-03-18 RX ADMIN — METOPROLOL TARTRATE 12.5 MG: 25 TABLET, FILM COATED ORAL at 08:04

## 2024-03-18 RX ADMIN — APIXABAN 5 MG: 5 TABLET, FILM COATED ORAL at 20:32

## 2024-03-18 RX ADMIN — FUROSEMIDE 20 MG: 20 TABLET ORAL at 08:04

## 2024-03-18 RX ADMIN — FUROSEMIDE 40 MG: 10 INJECTION, SOLUTION INTRAMUSCULAR; INTRAVENOUS at 16:58

## 2024-03-18 RX ADMIN — Medication 10 ML: at 08:04

## 2024-03-18 RX ADMIN — LEVOTHYROXINE SODIUM 100 MCG: 0.1 TABLET ORAL at 08:04

## 2024-03-18 ASSESSMENT — PAIN SCALES - GENERAL: PAINLEVEL_OUTOF10: 0

## 2024-03-18 NOTE — PROGRESS NOTES
Date of Admission: 3/5/2024. Hospital Day: 14     Brief course: This 88 y.o. female with PMHx of hypothyroidism, dementia, pulmonary fibrosis 2/2 COVID pneumonia/pulmonary edema who presented with SOB and hypoxia.  Admitted for acute hypoxic respiratory failure 2/2 pulmonary fibrosis due to COVID-19/CHF, acute PE, A-fib RVR.  Patient was initially on Airvo; wean down to HFNC; still requiring 6 L at rest and up to 15 L on exertion per respiratory eval.  Patient was evaluated by LTAC but was denied.   on board; currently looking to SNF placement.  Palliative care on board; patient is DNR CCA        Interval history:   Pt seen and examined today.  Overnight events noted, interval ancillary notes and labs reviewed.   On 6 L HFNC at rest satting at about 94%; requiring up to 15 L on exertion  Diuresing with Lasix -4.1 L since admission.  proBNP elevated on 921  Up in bed; reported that she cannot hear anything from her right ear today which is new to her.  ENT consulted  Denies any fever SOB, chest pain, nausea, vomiting or abdominal pain  Patient still hoping to go home.        Assessment/Plan:    Acute on chronic hypoxic respiratory failure; requiring 6 L at rest and up to 12 to 15 L with activity   likely  2/2 underlying pulmonary fibrosis/PE /A-fib and CHF  CTA chest, shows bilateral advanced pulmonary fibrosis particularly at the bases with superimposed acute bilateral groundglass opacities and left lower lobe branch PE.  Echo with LVH, EF 60 to 65%, grade 1 DD and RVSP of 47. proBNP 1408 on admission; down to 745  Pulmonology input appreciated. Continue IV diuresis, prn breathing treatment, inhalers.    No further need for steroids; Wean as tolerated keep sats above 92%  Pt will benefit from LTAC facility due to such high O2 requirement per pulmonology recs.  LTAC refused      Hyponatremia acute on chronic;  Serum sodium 132 this morning; restarted on Lasix 20 mg daily.  Continue to hold

## 2024-03-18 NOTE — PLAN OF CARE
Problem: ABCDS Injury Assessment  Goal: Absence of physical injury  Outcome: Progressing     Problem: Safety - Adult  Goal: Free from fall injury  3/17/2024 2235 by Rhett Ivy RN  Outcome: Progressing    Problem: Chronic Conditions and Co-morbidities  Goal: Patient's chronic conditions and co-morbidity symptoms are monitored and maintained or improved  Outcome: Progressing     Problem: Skin/Tissue Integrity  Goal: Absence of new skin breakdown  Description: 1.  Monitor for areas of redness and/or skin breakdown  2.  Assess vascular access sites hourly  3.  Every 4-6 hours minimum:  Change oxygen saturation probe site  4.  Every 4-6 hours:  If on nasal continuous positive airway pressure, respiratory therapy assess nares and determine need for appliance change or resting period.  3/17/2024 2235 by Rhett Ivy RN  Outcome: Progressing     Problem: Respiratory - Adult  Goal: Achieves optimal ventilation and oxygenation  Outcome: Progressing     Problem: Musculoskeletal - Adult  Goal: Return mobility to safest level of function  Outcome: Progressing  Goal: Return ADL status to a safe level of function  Outcome: Progressing     Problem: Nutrition Deficit:  Goal: Optimize nutritional status  Outcome: Progressing     Problem: Discharge Planning  Goal: Discharge to home or other facility with appropriate resources  Outcome: Progressing

## 2024-03-18 NOTE — CARE COORDINATION
Discharge Planning:     (PARISH) spoke with Altagracia at [facility]. The patient does not quality for LTAC. The patients insurance will not cover LTAC unless the patient has a new trach, new HD and new vent in order to quality with her [employer] insurance.      Electronically signed by Varun De Leon on 3/18/24 at 11:56 AM EDT

## 2024-03-18 NOTE — PROGRESS NOTES
Shift assessment completed. Routine vitals obtained. Scheduled medications given. Patient is awake, alert and oriented. Patient does not appear to be in distress, resting comfortably at this time. Patient is currently sitting up, eating breakfast. Call light within reach. Fall precautions in place. Bed locked and in low position. Bed alarm engaged. No further needs expressed. Electronically signed by Camila Nunes RN on 3/18/2024 at 8:17 AM

## 2024-03-18 NOTE — PROGRESS NOTES
[facility] - Inpatient Rehabilitation Department   Phone: [phone]    Occupational Therapy    [] Initial Evaluation            [x] Daily Treatment Note         [] Discharge Summary      Patient: Olive Mckeon   : 1935   MRN: [id number]   Date of Service:  3/18/2024    Admitting Diagnosis:  Acute respiratory failure (HCC)  Current Admission Summary: She presented to the hospital in feb for pneumonia/covid related pulmonary fibrosis and discharged on 2 L of oxygen. She is now having increased shortness of breath and found to have sats in the 70s. Bnp 1408 she received some IV lasix. CT chest shows new LLL pulmonary embolism. Echo with normal LVEF and grade 1 diastolic dysfunction. She went into AF with RVR. Dopplers show acute LLE DVT with plan to switch Lovenox to therapeutic dose Lovenox      Past Medical History:  has a past medical history of Adhesive capsulitis of shoulder, Hypothyroidism, On home O2, Osteoporosis, unspecified, Routine general medical examination at a health care facility, Seborrheic dermatitis, unspecified, and Unspecified disorder of skin and subcutaneous tissue.  Past Surgical History:  has a past surgical history that includes Cataract removal; Cataract removal; other surgical history; and Colonoscopy.    Discharge Recommendations: Olive Mckeon scored a 14/24 on the AM-PAC ADL Inpatient form. Current research shows that an AM-PAC score of 17 or less is typically not associated with a discharge to the patient's home setting. Based on the patient's AM-PAC score and their current ADL deficits, it is recommended that the patient have 3-5 sessions per week of Occupational Therapy at d/c to increase the patient's independence.  Please see assessment section for further patient specific details.    If patient discharges prior to next session this note will serve as a discharge summary.  Please see below for the latest assessment towards goals.       DME Required For  Outcomes  AM-PAC Inpatient Daily Activity Raw Score: 14                                    Cognition  WFL  Orientation:    alert and oriented x 4  Command Following:   WFL     Education  Barriers To Learning: none  Patient Education: patient educated on goals, OT role and benefits, plan of care, ADL adaptive strategies, IADL safety, energy conservation, transfer training, discharge recommendations  Learning Assessment:  patient verbalizes understanding, would benefit from continued reinforcement    Assessment  Activity Tolerance: fair--Pt on 6-7 L of O2 upon entry. O2 increased to 10 L during therapy activities. Pt's O2 desaturated to low 80's with activity, but pt was able to recover to high 80's-low 90's during seated rest breaks.   Impairments Requiring Therapeutic Intervention: decreased functional mobility, decreased ADL status, decreased strength, decreased cognition, decreased endurance, decreased balance  Prognosis: good  Clinical Assessment: Pt continues to be presenting below her functional baseline with the above deficits associated with acute respiratory failure. She is primarily limited by substantial decrease in functional strength/endurance. Also limited by significant O2 demands. Pt very active at PLOF. Continued OT indicated in order to maximize safety and independence with all occupational pursuits.   Safety Interventions: patient left in chair, chair alarm in place, call light within reach, nurse notified, and family/caregiver present    Plan  Frequency: 3-5 x/per week  Current Treatment Recommendations: strengthening, balance training, functional mobility training, transfer training, gait training, stair training, endurance training, ADL/self-care training, IADL training, home management training, and safety education    Goals  Patient Goals: Return to home.    Short Term Goals:  Time Frame: Discharge  Patient will complete upper body ADL at contact guard assistance   Patient will complete lower

## 2024-03-18 NOTE — PROGRESS NOTES
infiltrate is identified.  There is no pleural effusion or  pneumothorax.  There are a few tiny scattered calcified granulomas.     Upper Abdomen: Limited images of the upper abdomen are noted for a  gallbladder filled with stones but otherwise unremarkable.  Calcified  granulomas are noted in the liver and spleen.     Soft Tissues/Bones: No acute bone or soft tissue abnormality.     IMPRESSION:  Nonobstructive left lower lobe pulmonary embolus.  No heart strain.  The main  pulmonary artery is normal in caliber.     Increased patchy diffuse ground-glass opacity and interstitial prominence on  a background of chronic interstitial disease and possible emphysema.  Findings most likely represent pulmonary edema.  Diffuse atypical/viral  pneumonia is not excluded.     Evidence of old granulomatous disease.    Problem List:     Acute hypoxic respiratory failure requiring Airvo  History of COVID-19 pneumonia with pulmonary fibrosis  Acute pulmonary edema  Acute PE  Assessment/Plan:     History of COVID-19 pulmonary fibrosis, now complicated by pulmonary edema and acute PE.    Repeat CT chest without contrast was reviewed, similar appearance to prior CT imaging from 3/6-diffuse patchy groundglass opacities and interstitial fibrosis particularly at both bases.  Appearances are more likely suggestive of pulmonary edema.    2D echo from 3/5 showed LVH, EF 60 to 65% with grade 1 diastolic dysfunction and RVSP of 47.  Rising proBNP noted, will switch p.o. Lasix back to 40 mg twice daily IV.    Continue with Eliquis for treatment of PE.    Spoke with case management.  Apparently family wants to take her home.  This would only be possible with hospice care, given patient's O2 needs.  Other option would be temporary stay at an LTAC facility.    Pulmonary will follow-up patient only as needed.  Will organize outpatient follow-up for 1 month.  Overall prognosis is poor.  Currently DNR [facility]JIT.    Castro Lau  MD    Addendum: 4 PM.  Long discussion with patient's daughter/granddaughter at bedside.  They had numerous questions about CT scan findings and prognosis, which I have explained to my best ability.  Patient's current presentation most likely related to pulmonary edema + acute PE with background history of COVID-19 related pulmonary fibrosis.  They are both not yet prepared for hospice care but would want to give a couple of more days before a final decision is made about discharge planning.  They would not want the patient to go to an LTAC facility.

## 2024-03-18 NOTE — PROGRESS NOTES
Shift assessment complete. Routine Vitals obtained and stable, Meds given as ordered per MAR. Patient resting in bed. Respirations unlabored. Call light in reach. Fall precautions in place. No needs expressed at this time. Will continue to monitor.

## 2024-03-18 NOTE — CARE COORDINATION
Discharge Planning:     (CM) spoke with the pulmonologist Dr Lau who stated the option is LTAC or Hospice. Doctor stated getting hospice services would be the only way she could get her home otherwise the patient will be right back in the ER.    CM spoke with the daughter-Elva last week about SNF placement but only wanted a referral sent to [facility] who cannot accept based on her oxygen needs. The daughter wants to take her mother home.     CM called the daughter and LVM to return the call to speak about discharge planning.    Electronically signed by Varun De Leon on 3/18/24 at 8:40 AM EDT

## 2024-03-19 LAB
ALBUMIN SERPL-MCNC: 2.9 G/DL (ref 3.4–5)
ANA SER QL IA: NEGATIVE
ANION GAP SERPL CALCULATED.3IONS-SCNC: 9 MMOL/L (ref 3–16)
BUN SERPL-MCNC: 15 MG/DL (ref 7–20)
CALCIUM SERPL-MCNC: 9 MG/DL (ref 8.3–10.6)
CCP IGG SERPL-ACNC: <0.5 U/ML (ref 0–2.9)
CHLORIDE SERPL-SCNC: 87 MMOL/L (ref 99–110)
CO2 SERPL-SCNC: 32 MMOL/L (ref 21–32)
CREAT SERPL-MCNC: 0.6 MG/DL (ref 0.6–1.2)
ERYTHROCYTE [SEDIMENTATION RATE] IN BLOOD BY WESTERGREN METHOD: 55 MM/HR (ref 0–30)
GFR SERPLBLD CREATININE-BSD FMLA CKD-EPI: >60 ML/MIN/{1.73_M2}
GLUCOSE SERPL-MCNC: 109 MG/DL (ref 70–99)
PHOSPHATE SERPL-MCNC: 3.7 MG/DL (ref 2.5–4.9)
POTASSIUM SERPL-SCNC: 4.2 MMOL/L (ref 3.5–5.1)
RHEUMATOID FACT SER IA-ACNC: <10 IU/ML
SODIUM SERPL-SCNC: 128 MMOL/L (ref 136–145)

## 2024-03-19 PROCEDURE — 83516 IMMUNOASSAY NONANTIBODY: CPT

## 2024-03-19 PROCEDURE — 86038 ANTINUCLEAR ANTIBODIES: CPT

## 2024-03-19 PROCEDURE — 97535 SELF CARE MNGMENT TRAINING: CPT

## 2024-03-19 PROCEDURE — 6370000000 HC RX 637 (ALT 250 FOR IP): Performed by: INTERNAL MEDICINE

## 2024-03-19 PROCEDURE — 36415 COLL VENOUS BLD VENIPUNCTURE: CPT

## 2024-03-19 PROCEDURE — 97530 THERAPEUTIC ACTIVITIES: CPT

## 2024-03-19 PROCEDURE — 6370000000 HC RX 637 (ALT 250 FOR IP): Performed by: NURSE PRACTITIONER

## 2024-03-19 PROCEDURE — 86200 CCP ANTIBODY: CPT

## 2024-03-19 PROCEDURE — 6360000002 HC RX W HCPCS: Performed by: INTERNAL MEDICINE

## 2024-03-19 PROCEDURE — 6370000000 HC RX 637 (ALT 250 FOR IP): Performed by: STUDENT IN AN ORGANIZED HEALTH CARE EDUCATION/TRAINING PROGRAM

## 2024-03-19 PROCEDURE — 1200000000 HC SEMI PRIVATE

## 2024-03-19 PROCEDURE — 94760 N-INVAS EAR/PLS OXIMETRY 1: CPT

## 2024-03-19 PROCEDURE — 2580000003 HC RX 258: Performed by: INTERNAL MEDICINE

## 2024-03-19 PROCEDURE — 80069 RENAL FUNCTION PANEL: CPT

## 2024-03-19 PROCEDURE — 99232 SBSQ HOSP IP/OBS MODERATE 35: CPT | Performed by: INTERNAL MEDICINE

## 2024-03-19 PROCEDURE — 86431 RHEUMATOID FACTOR QUANT: CPT

## 2024-03-19 PROCEDURE — 85652 RBC SED RATE AUTOMATED: CPT

## 2024-03-19 PROCEDURE — 94761 N-INVAS EAR/PLS OXIMETRY MLT: CPT

## 2024-03-19 PROCEDURE — 2700000000 HC OXYGEN THERAPY PER DAY

## 2024-03-19 RX ADMIN — LEVOTHYROXINE SODIUM 100 MCG: 0.1 TABLET ORAL at 07:59

## 2024-03-19 RX ADMIN — Medication 10 ML: at 08:00

## 2024-03-19 RX ADMIN — DONEPEZIL HYDROCHLORIDE 20 MG: 5 TABLET, FILM COATED ORAL at 21:28

## 2024-03-19 RX ADMIN — APIXABAN 5 MG: 5 TABLET, FILM COATED ORAL at 07:59

## 2024-03-19 RX ADMIN — FUROSEMIDE 40 MG: 10 INJECTION, SOLUTION INTRAMUSCULAR; INTRAVENOUS at 08:00

## 2024-03-19 RX ADMIN — Medication 10 ML: at 21:28

## 2024-03-19 RX ADMIN — METOPROLOL TARTRATE 12.5 MG: 25 TABLET, FILM COATED ORAL at 07:59

## 2024-03-19 RX ADMIN — DIGOXIN 125 MCG: 125 TABLET ORAL at 07:59

## 2024-03-19 RX ADMIN — APIXABAN 5 MG: 5 TABLET, FILM COATED ORAL at 21:28

## 2024-03-19 RX ADMIN — CHOLECALCIFEROL TAB 25 MCG (1000 UNIT) 1000 UNITS: 25 TAB at 07:59

## 2024-03-19 RX ADMIN — METOPROLOL TARTRATE 12.5 MG: 25 TABLET, FILM COATED ORAL at 21:28

## 2024-03-19 RX ADMIN — ASPIRIN 81 MG 81 MG: 81 TABLET ORAL at 08:00

## 2024-03-19 ASSESSMENT — ENCOUNTER SYMPTOMS: SHORTNESS OF BREATH: 1

## 2024-03-19 NOTE — PROGRESS NOTES
MD Jessica Mota MD Samir Brahmbhatt, MD               Office: [phone]                      Fax: [phone]             Home Inventory S[pecialists                   NEPHROLOGY INPATIENT PROGRESS NOTE:     PATIENT NAME: Olive Mckeon  : 1935   MRN: [id number]           RECOMMENDATIONS:     Management:- Refer to orders   - Monitor Serum Na      - Goal Serum Na mid 130s, by next /24 hours     -resumed Lasix-40 IV twice daily  -stop Aldactone      - Fluid restriction 1.5L /24 hrs.,  - Minimize use any hypotonic/isotonic fluids such as D5W, NS, LR with other medications/drips  ,   - Concentrate continuous drip fluids as much as possible,   - Avoid IVF  , unless needed for resuscitation/hypovolemia w/ hypotension+tachycardia,    -Pulmonary team following for hypoxia and abnormal CT chest  -They are working her up, including vasculitis panel    - minimize SSRI, NSAIDs use  - Strict I/O with daily weights.  - Monitor neurological status closely  --- Call us urgently if any/worsening neurological findings. ** **       Discharge plans from renal standpoint-   1 more day likely  +   -: follow up w/ us at  [facility]  in 2 weeks after d/c. (I updated our information in discharge follow up providers' list.)           IMPRESSION:       Admitted on  3/5/2024  for:  Acute respiratory failure (HCC) [J96.00]  Acute respiratory failure with hypoxia (HCC) [J96.01]  Increased oxygen demand [R68.89]       Hyponatremia :    Was improving  Now worsening, 134-132-128 now    Other Renal labs are stable     Acute on Chronic,   Moderate - severe range,   mildly symptomatic,    -  No Reported Severe symptoms (such as seizures, obtundation, coma, or respiratory arrest).   - no need for Hypertonic saline or acute reversal     - Risk factors for hyponatremia: Likely hypovolemia,  diuresis,  - Patient is at  risk of developing Osmotic Demyelination Syndrome.    - Call us urgently if  interval history / nephrology update / medical decision making:   Refer to assessment and plan for more details.   Hypoxia worsening   Chest CT Scan--as abnormal  Renal labs noted.   =======================================================================================     Chart reviewed, patient's pertinent electronic medical records , Medical history and medication reviewed as needed for my evaulation.         MEDICATIONS: reviewed by me.   Medications Prior to Admission:  No current facility-administered medications on file prior to encounter.     Current Outpatient Medications on File Prior to Encounter   Medication Sig Dispense Refill    vitamin D 25 MCG (1000 UT) CAPS Take 1 capsule by mouth daily      fluticasone (FLONASE) 50 MCG/ACT nasal spray 1 spray by Each Nostril route in the morning and at bedtime 16 g 1    levothyroxine (SYNTHROID) 100 MCG tablet Take 1 tablet by mouth daily 90 tablet 1    estradiol (ESTRACE) 0.1 MG/GM vaginal cream Place 2 g vaginally daily as needed      docusate sodium (COLACE) 100 MG capsule Take 1 capsule by mouth 2 times daily as needed for Constipation           Current Facility-Administered Medications:     furosemide (LASIX) injection 40 mg, 40 mg, IntraVENous, BID, Castro Lau MD, 40 mg at 03/19/24 0800    sodium chloride (OCEAN, BABY AYR) 0.65 % nasal spray 1 spray, 1 spray, Each Nostril, Q4H PRN, Benjamin Wagner MD, 1 spray at 03/09/24 2106    digoxin (LANOXIN) tablet 125 mcg, 125 mcg, Oral, Daily, Jg Mart APRN - CNP, 125 mcg at 03/19/24 0759    [COMPLETED] apixaban (ELIQUIS) tablet 10 mg, 10 mg, Oral, BID, 10 mg at 03/14/24 2058 **FOLLOWED BY** apixaban (ELIQUIS) tablet 5 mg, 5 mg, Oral, BID, Al Marie MD, 5 mg at 03/19/24 0759    perflutren lipid microspheres (DEFINITY) injection 1.5 mL, 1.5 mL, IntraVENous, ONCE PRN, Shanita Hines MD    metoprolol tartrate (LOPRESSOR) tablet 12.5 mg, 12.5 mg, Oral, BID, Al Marie MD, 12.5 mg at

## 2024-03-19 NOTE — PROGRESS NOTES
Purpose of Encounter: Advanced care planning in light of hospitalization for persistent respiratory failure with no improvement  Parties in attendance: :  pt ,daughter Frank Stevenson Sanjeev MD,   Decisional Capacity:Yes  Objective: Patient and daughter understands that this discussions is  for goals of care  Goals of Care Determinations:   Patient has been practically bedbound with prolonged and refractory hypoxic respiratory failure, at baseline requires 5 to 10 L but significantly desaturates needing nonrebreather and Optiflow with minimal exertion, patient reports that she has always always had a good and healthy life, was very active, going to commit activities and even gym regularly and this status not something she would want to be in if she is not going to improve.  Since he has had a good life and just wants to be home and be comfortable   Code Status:  DNR CC  Time Spent on Advanced Planning Documents: 22  mins.  Advanced Care Planning Documents:   Completed advances directives, advanced directives in chart. POA daughter Elva

## 2024-03-19 NOTE — PROGRESS NOTES
[facility]    Attempted to reach patient's daughter to set up meeting to discuss HOC. No answer. LVM. Awaiting response.         Oneida MARTINEZN, RN  [facility]  C:[phone]  Main HOC: [phone]    After 1900, weekends, holidays, and Thurdays: please call main HOC number for assistance.

## 2024-03-19 NOTE — CONSULTS
PALLIATIVE MEDICINE CONSULTATION     Patient name:Olive Mckeon   MRN:[id number]    :1935  Room/Bed:5TN-5564/5564-01   LOS: 14 days         Date of consult:3/19/2024    Inpatient consult to Palliative Care  Consult performed by: Cristina Whitt APRN - CNP  Consult ordered by: Al Marie MD  Reason for consult: Goals of care      Inpatient consult to Palliative Care  Consult performed by: Cristina Whitt APRN - CNP  Consult ordered by: Brittany Muñiz MD        ASSESSMENT/RECOMMENDATIONS     88 y.o. female with acute on chronic respiratory failure with increase in physical decline.      Symptom Management:  Acute on chronic respiratory failure with hypoxia -pulmonology following.  Currently requiring nasal cannula, 6 L at rest up to 15 L with activity.  Education given on disease process, pulmonology documentation reinforced.  Physical debility -secondary to oxygen demands.  Patient would like to to discharge into her home, family planning to assist.  We discussed the pros and cons of skilled services such as physical and Occupational Therapy in the home versus hospice care.  Unfortunately due to patient's oxygen demands, home therapy may be minimally productive.  PE -management per attending, will continue Eliquis.  Goals of Care - See below, CODE STATUS updated.  Hospice referral placed for further information regarding home services.    Patient/Family Goals of Care :    Goals of care conversation with the patient at the bedside, granddaughter present, daughter contacted via telephone.  We reviewed what patient understands regarding her disease process as well as goals moving forward.  Patient's ultimate goal is to return home, she is comfortable with her current prognosis.  Understands that her life is limited due to her respiratory failure.  States \"I do not want to live like this, it is too hard.  I am ready to go home with my \". Hospice education was provided to the

## 2024-03-19 NOTE — PROGRESS NOTES
[facility]    Met with patient and family to discuss hospice philosophy, services, levels of care, coverage, DME, and IPU. Patient's family is very overwhelmed and emotional at this time- patient's condition is declining. Spent time listening to timeline of events, current condition, and patient's wishes. Emotional support offered.     Patient desires to go home. Family working on getting grandson home to see her. She is currently on Airvo- but does not want airvo at home. Goal is to transport patient home on 15 L until she can see her grandson- then transition to comfort care only per her request.     This writer to order DME for delivery tomorrow.    HOC to follow up tomorrow for further discharge needs.     Oneida STEPHENS, RN  [facility]  C:[phone]  Main HOC: [phone]    After 1900, weekends, holidays, and Thurdays: please call main HOC number for assistance.

## 2024-03-19 NOTE — PROGRESS NOTES
[facility] - Inpatient Rehabilitation Department   Phone: [phone]    Physical Therapy    [] Initial Evaluation            [x] Daily Treatment Note         [] Discharge Summary      Patient: Olive Mckeon   : 1935   MRN: [id number]   Date of Service:  3/19/2024  Admitting Diagnosis: Acute respiratory failure (HCC)  Current Admission Summary: She presented to the hospital in feb for pneumonia/covid related pulmonary fibrosis and discharged on 2 L of oxygen. She is now having increased shortness of breath and found to have sats in the 70s. Bnp 1408 she received some IV lasix. CT chest shows new LLL pulmonary embolism. Echo with normal LVEF and grade 1 diastolic dysfunction. She went into AF with RVR. Dopplers show acute LLE DVT with plan to switch Lovenox to therapeutic dose Lovenox    3/6 -  CT of chest   - Nonobstructive left lower lobe pulmonary embolus.     -Increased patchy diffuse ground-glass opacity and interstitial prominence on a background of chronic interstitial disease and possible emphysema.  Findings most likely represent pulmonary edema.  Diffuse atypical/viral pneumonia is not excluded.   3/19: Patient electing to go home with hospice   Past Medical History:  has a past medical history of Adhesive capsulitis of shoulder, Hypothyroidism, On home O2, Osteoporosis, unspecified, Routine general medical examination at a health care facility, Seborrheic dermatitis, unspecified, and Unspecified disorder of skin and subcutaneous tissue.  Past Surgical History:  has a past surgical history that includes Cataract removal; Cataract removal; other surgical history; and Colonoscopy.  Discharge Recommendations: Olive Mckeon scored a 12/24 on the AM-PAC short mobility form.  At this time, no further PT is recommended upon discharge as patient is planning to discharge home with hospice. Recommend patient returns to prior setting with prior services.       DME Required For Discharge: hospital  patient on extensive respiratory support  Impairments Requiring Therapeutic Intervention: decreased functional mobility, decreased strength, decreased safety awareness, decreased endurance, decreased balance  Prognosis: good  Clinical Assessment: Patient currently requires mod A for stand pivot transfers. She is on extensive respiratory support. Per palliative care, patient is planning to discharge home with hospice. Will continue to see patient during her inpatient stay to help improve strength and reduce caregiver burden. Recommend patient return home with comfort care and no additional therapy services upon discharge.   Safety Interventions: patient left in bed, bed alarm in place, call light within reach, gait belt, patient at risk for falls, nurse notified, and family/caregiver present    Plan  Frequency: 3-5 x/per week  Current Treatment Recommendations: strengthening, balance training, functional mobility training, transfer training, gait training, stair training, endurance training, home exercise program, and safety education    Goals  Patient Goals: to go home   Short Term Goals:  Time Frame: discharge  Patient will complete bed mobility at supervision   Patient will complete transfers at supervision   Patient will ambulate 25 ft with use of LRAD at contact guard assistance  Patient will ascend/descend 1 stairs without use of HR at contact guard assistance    Above goals reviewed on 3/19/2024.  All goals are ongoing at this time unless indicated above.      Therapy Session Time      Individual Group Co-treatment   Time In 1440       Time Out 1510       Minutes 30         Timed Code Treatment Minutes:  30 Minutes  Total Treatment Minutes:  30 minutes        Electronically Signed By: Kaelyn Brizuela PT, DPT [id number]

## 2024-03-19 NOTE — PROGRESS NOTES
Per Fab Vaca, RN manager, it is okay for patient to stay on unit with airvo overnight because patient is discharging home with hospice tomorrow once her equipment is ordered and delivered. Took patient off of telemetry and continuous pulse oximeter per Fab due to patient being DNR-CC and going home tomorrow, but to continue checking pulse oximeter intermittently. Before taking patient off, patient's Spo2 was 97%. Carmella Rashid, Charge RN agreed with RN manager and HOC RN and aware.

## 2024-03-19 NOTE — PROGRESS NOTES
[facility] - Inpatient Rehabilitation Department   Phone: [phone]    Occupational Therapy    [] Initial Evaluation            [x] Daily Treatment Note         [] Discharge Summary      Patient: Olive Mckeon   : 1935   MRN: [id number]   Date of Service:  3/19/2024    Admitting Diagnosis:  Acute respiratory failure (HCC)  Current Admission Summary: She presented to the hospital in feb for pneumonia/covid related pulmonary fibrosis and discharged on 2 L of oxygen. She is now having increased shortness of breath and found to have sats in the 70s. Bnp 1408 she received some IV lasix. CT chest shows new LLL pulmonary embolism. Echo with normal LVEF and grade 1 diastolic dysfunction. She went into AF with RVR. Dopplers show acute LLE DVT with plan to switch Lovenox to therapeutic dose Lovenox      Past Medical History:  has a past medical history of Adhesive capsulitis of shoulder, Hypothyroidism, On home O2, Osteoporosis, unspecified, Routine general medical examination at a health care facility, Seborrheic dermatitis, unspecified, and Unspecified disorder of skin and subcutaneous tissue.  Past Surgical History:  has a past surgical history that includes Cataract removal; Cataract removal; other surgical history; and Colonoscopy.    Discharge Recommendations: Olive Mckeon scored a 15/24 on the AM-PAC ADL Inpatient form. Current research shows that an AM-PAC score of 17 or less is typically not associated with a discharge to the patient's home setting. Based on the patient's AM-PAC score and their current ADL deficits, it is recommended that the patient have 3-5 sessions per week of Occupational Therapy at d/c to increase the patient's independence.  Please see assessment section for further patient specific details.    If patient discharges prior to next session this note will serve as a discharge summary.  Please see below for the latest assessment towards goals.       DME Required For  minutes       Electronically Signed By:   Melissa Altamirano, FRANCISCO/L, C/NDT ([id number])

## 2024-03-19 NOTE — PROGRESS NOTES
Date of Admission: 3/5/2024. Hospital Day: 15       Assessment/Plan:    88 y.o. female with PMHx of hypothyroidism, dementia, pulmonary fibrosis 2/2 COVID pneumonia/pulmonary edema who presented with SOB and hypoxia.  Admitted for acute hypoxic respiratory failure 2/2 pulmonary fibrosis due to COVID-19/CHF, acute PE, A-fib RVR.  Patient was initially on Airvo; wean down to HFNC; still requiring 6 L at rest and up to 15 L on exertion per respiratory eval.  Patient was evaluated by LTAC but was denied.   on board; currently looking to SNF placement.     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]     Increased oxygen demand [R68.89]     Elevated brain natriuretic peptide (BNP) level [R79.89]     Acute pulmonary embolism (HCC) [I26.99]     PAF (paroxysmal atrial fibrillation) (HCC) [I48.0]     Atrial fibrillation with RVR (HCC) [I48.91]     Acute respiratory failure ([facility]) [J96.00]      Acute on chronic hypoxic respiratory failure; requiring 6 L at rest and up to 12 to 15 L with activity  3/19 discharge date on regular high flow oxygen and was put on nonrebreather after oxygen dropped to 70s   likely  2/2 underlying pulmonary fibrosis/PE /A-fib and CHF  CTA chest, shows bilateral advanced pulmonary fibrosis particularly at the bases with superimposed acute bilateral groundglass opacities and left lower lobe branch PE.  Echo with LVH, EF 60 to 65%, grade 1 DD and RVSP of 47. proBNP 1408 on admission; down to 745  Pulmonology input appreciated. Continue IV diuresis, prn breathing treatment, inhalers.    No further need for steroids; Wean as tolerated keep sats above 92%  Not approved for LTAC, patient and    pneumonia is not excluded.      Evidence of old granulomatous disease.         XR CHEST PORTABLE   Final Result      The mediastinum appears to widen which is new from the previous study.  This   may be related to fluid overload             IP CONSULT TO CARDIOLOGY  IP CONSULT TO PULMONOLOGY  IP CONSULT TO HEART FAILURE NURSE/COORDINATOR  IP CONSULT TO DIETITIAN  IP CONSULT TO PHARMACY  IP CONSULT TO NEPHROLOGY  IP CONSULT TO PALLIATIVE CARE  IP CONSULT TO DIETITIAN  IP CONSULT TO OTOLARYNGOLOGY  IP CONSULT TO PALLIATIVE CARE  IP CONSULT TO HOSPICE      Al Marie MD

## 2024-03-19 NOTE — PROGRESS NOTES
Nutrition Note    RECOMMENDATIONS  PO Diet: Continue current diet  ONS: Continue Magic Cup BID (chocolate)     NUTRITION ASSESSMENT   Pt triggered for follow-up. Continues on DWIGHT, 1500 mL fluids restricted diet with Magic Cup ordered BID. Variable recent documented meal intakes ranging from 1-100%. Upon visiting, pt reported appetite and po intake has been so/so. Is accepting Magic Cup, prefers chocolate flavor. RD will continue to monitor for adequate po intake vs goals of care.     Nutrition Related Findings: Na 128. LBM 3/18. +1 BLE edema.  Wounds: None  Nutrition Education:  Education completed ([facility] diet education 3/11)   Nutrition Goals: PO intake 50% or greater, by next RD assessment, other (specify) (vs goals of care)     MALNUTRITION ASSESSMENT   Chronic Illness  Malnutrition Status: Severe malnutrition (as determined by initial RD assessment 3/13)  Findings of the 6 clinical characteristics of malnutrition:  Energy Intake:  75% or less estimated energy requirements for 1 month or longer  Weight Loss:  Greater than 7.5% over 3 months     Body Fat Loss:  Mild body fat loss Orbital, Triceps   Muscle Mass Loss:  Mild muscle mass loss Temples (temporalis), Hand (interosseous)  Fluid Accumulation:  No significant fluid accumulation     Strength:  Not Performed    NUTRITION DIAGNOSIS   Severe malnutrition, In context of acute, non-illness related related to inadequate protein-energy intake as evidenced by Criteria as identified in malnutrition assessment    CURRENT NUTRITION THERAPIES  ADULT DIET; Regular; No Added Salt (3-4 gm); 1500 ml  ADULT ORAL NUTRITION SUPPLEMENT; Lunch, Dinner; Frozen Oral Supplement     PO Intake: Unable to assess (variable recent documented meal intakes)   PO Supplement Intake:Unable to assess (but reported accepting)    ANTHROPOMETRICS  Current Height: 172.7 cm (5' 8\")  Current Weight - Scale: 63.5 kg (139 lb 15.9 oz)    Admission weight: 65.3 kg (144 lb)  Ideal Body Weight  (IBW): 140 lbs  (64 kg)        BMI: 21.1    COMPARATIVE STANDARDS  Total Energy Requirements (kcals/day): 0245-7796     Protein (g):         Fluid (mL/day):  2933-6584    The patient will be monitored per nutrition standards of care. Consult dietitian if additional nutrition interventions are needed prior to RD reassessment.     Daksha Bird MS, RD, LD    Contact: [phone]

## 2024-03-19 NOTE — PROGRESS NOTES
[facility] Pulmonary/CCM Progress note      Admit Date: 3/5/2024    Chief Complaint: Shortness of breath    Subjective:     Interval History: Still requiring 6 L O2, is winded at rest.  No significant cough or chest pain.  Patient states that she feels \"fine\".    Scheduled Meds:   furosemide  40 mg IntraVENous BID    digoxin  125 mcg Oral Daily    apixaban  5 mg Oral BID    metoprolol tartrate  12.5 mg Oral BID    sodium chloride flush  5-40 mL IntraVENous 2 times per day    aspirin  81 mg Oral Daily    donepezil  20 mg Oral Nightly    levothyroxine  100 mcg Oral Daily    Vitamin D  1,000 Units Oral Daily     Continuous Infusions:   sodium chloride 10 mL/hr at 03/06/24 0201     PRN Meds:sodium chloride, perflutren lipid microspheres, albuterol, sodium chloride flush, sodium chloride, potassium chloride **OR** potassium alternative oral replacement **OR** potassium chloride, magnesium sulfate, ondansetron **OR** ondansetron, polyethylene glycol, acetaminophen **OR** acetaminophen, docusate sodium, ipratropium 0.5 mg-albuterol 2.5 mg    Review of Systems  Constitutional: negative for fatigue, fevers, malaise and weight loss  Ears, nose, mouth, throat: negative for ear drainage, epistaxis, hoarseness, nasal congestion, sore throat and voice change  Respiratory: negative except for cough, shortness of breath, and sputum  Cardiovascular: negative for chest pain, chest pressure/discomfort, irregular heart beat, lower extremity edema and palpitations  Gastrointestinal: negative for abdominal pain, constipation, diarrhea, jaundice, melena, odynophagia, reflux symptoms and vomiting  Hematologic/lymphatic: negative for bleeding, easy bruising, lymphadenopathy and petechiae  Musculoskeletal:negative for arthralgias, bone pain, muscle weakness, neck pain and stiff joints  Neurological: negative for dizziness, gait problems, headaches, seizures, speech problems, tremors and weakness  Behavioral/Psych: negative for anxiety, behavior

## 2024-03-20 VITALS
SYSTOLIC BLOOD PRESSURE: 106 MMHG | HEIGHT: 68 IN | OXYGEN SATURATION: 97 % | RESPIRATION RATE: 18 BRPM | BODY MASS INDEX: 21.12 KG/M2 | HEART RATE: 84 BPM | DIASTOLIC BLOOD PRESSURE: 62 MMHG | TEMPERATURE: 98 F | WEIGHT: 139.33 LBS

## 2024-03-20 LAB
ALBUMIN SERPL-MCNC: 2.8 G/DL (ref 3.4–5)
ANION GAP SERPL CALCULATED.3IONS-SCNC: 9 MMOL/L (ref 3–16)
BUN SERPL-MCNC: 15 MG/DL (ref 7–20)
CALCIUM SERPL-MCNC: 9.1 MG/DL (ref 8.3–10.6)
CHLORIDE SERPL-SCNC: 89 MMOL/L (ref 99–110)
CO2 SERPL-SCNC: 34 MMOL/L (ref 21–32)
CREAT SERPL-MCNC: 0.7 MG/DL (ref 0.6–1.2)
GFR SERPLBLD CREATININE-BSD FMLA CKD-EPI: >60 ML/MIN/{1.73_M2}
GLUCOSE SERPL-MCNC: 110 MG/DL (ref 70–99)
MAGNESIUM SERPL-MCNC: 2.2 MG/DL (ref 1.8–2.4)
PHOSPHATE SERPL-MCNC: 3.5 MG/DL (ref 2.5–4.9)
POTASSIUM SERPL-SCNC: 4 MMOL/L (ref 3.5–5.1)
SODIUM SERPL-SCNC: 132 MMOL/L (ref 136–145)

## 2024-03-20 PROCEDURE — 6360000002 HC RX W HCPCS: Performed by: INTERNAL MEDICINE

## 2024-03-20 PROCEDURE — 99497 ADVNCD CARE PLAN 30 MIN: CPT | Performed by: INTERNAL MEDICINE

## 2024-03-20 PROCEDURE — 83735 ASSAY OF MAGNESIUM: CPT

## 2024-03-20 PROCEDURE — 6370000000 HC RX 637 (ALT 250 FOR IP): Performed by: INTERNAL MEDICINE

## 2024-03-20 PROCEDURE — 80069 RENAL FUNCTION PANEL: CPT

## 2024-03-20 PROCEDURE — 2700000000 HC OXYGEN THERAPY PER DAY

## 2024-03-20 PROCEDURE — 6370000000 HC RX 637 (ALT 250 FOR IP): Performed by: NURSE PRACTITIONER

## 2024-03-20 PROCEDURE — 99233 SBSQ HOSP IP/OBS HIGH 50: CPT | Performed by: INTERNAL MEDICINE

## 2024-03-20 PROCEDURE — 6370000000 HC RX 637 (ALT 250 FOR IP): Performed by: STUDENT IN AN ORGANIZED HEALTH CARE EDUCATION/TRAINING PROGRAM

## 2024-03-20 PROCEDURE — 94761 N-INVAS EAR/PLS OXIMETRY MLT: CPT

## 2024-03-20 PROCEDURE — 36415 COLL VENOUS BLD VENIPUNCTURE: CPT

## 2024-03-20 PROCEDURE — 2580000003 HC RX 258: Performed by: INTERNAL MEDICINE

## 2024-03-20 RX ORDER — LORAZEPAM 2 MG/ML
1 CONCENTRATE ORAL EVERY 4 HOURS PRN
Qty: 10 ML | Refills: 0 | Status: SHIPPED | OUTPATIENT
Start: 2024-03-20 | End: 2024-03-27

## 2024-03-20 RX ORDER — FUROSEMIDE 40 MG/1
40 TABLET ORAL DAILY
Qty: 60 TABLET | Refills: 3 | Status: SHIPPED | OUTPATIENT
Start: 2024-03-20 | End: 2024-03-27

## 2024-03-20 RX ORDER — MORPHINE SULFATE 100 MG/5ML
5 SOLUTION ORAL
Qty: 15 ML | Refills: 0 | Status: SHIPPED | OUTPATIENT
Start: 2024-03-20 | End: 2024-03-25

## 2024-03-20 RX ORDER — LORAZEPAM 1 MG/1
1 TABLET ORAL ONCE
Status: COMPLETED | OUTPATIENT
Start: 2024-03-20 | End: 2024-03-20

## 2024-03-20 RX ADMIN — APIXABAN 5 MG: 5 TABLET, FILM COATED ORAL at 08:08

## 2024-03-20 RX ADMIN — CHOLECALCIFEROL TAB 25 MCG (1000 UNIT) 1000 UNITS: 25 TAB at 08:08

## 2024-03-20 RX ADMIN — LEVOTHYROXINE SODIUM 100 MCG: 0.1 TABLET ORAL at 08:08

## 2024-03-20 RX ADMIN — METOPROLOL TARTRATE 12.5 MG: 25 TABLET, FILM COATED ORAL at 08:08

## 2024-03-20 RX ADMIN — Medication 10 ML: at 08:09

## 2024-03-20 RX ADMIN — LORAZEPAM 1 MG: 1 TABLET ORAL at 16:32

## 2024-03-20 RX ADMIN — DIGOXIN 125 MCG: 125 TABLET ORAL at 08:08

## 2024-03-20 RX ADMIN — ASPIRIN 81 MG 81 MG: 81 TABLET ORAL at 08:08

## 2024-03-20 RX ADMIN — FUROSEMIDE 40 MG: 10 INJECTION, SOLUTION INTRAMUSCULAR; INTRAVENOUS at 08:09

## 2024-03-20 NOTE — CARE COORDINATION
Case Management -  Discharge Note      Patient Name: Olive Mckeon                   YOB: 1935            Readmission Risk (Low < 19, Mod (19-27), High > 27): Readmission Risk Score: 15.3    Current PCP: Blane Hale MD    ([facility]ly [facility]) Important Message from Medicare:    Date: 3/20/24    PT AM-PAC: 12 /24  OT AM-PAC: 15 /24    [facility] [id number] [place] [address]  Phone: [phone]  Fax: [phone]     Financial    Payor: MEDIGOLD / Plan: MEDIGOLD / Product Type: *No Product type* /     Pharmacy:  Potential assistance Purchasing Medications:    Meds-to-Beds request: Yes      [facility] [id number] - [place], [place] - [address] [phone] - F [phone]  [address]  [place] [address]  Phone: [phone] Fax: [phone]      Notes:    Additional Case Management Notes: Patient will discharge home today with Hospice services. Transport set for 4pm with [facility]. Patient has no further needs at this time.    Electronically signed by Varun De Leon on 3/20/24 at 12:53 PM EDT

## 2024-03-20 NOTE — ACP (ADVANCE CARE PLANNING)
ADVANCED CARE PLANNING    Name:Olive Mckeon       :  1935              MRN:  [id number]      Purpose of Encounter: Advanced care planning in light of worsening hypoxia/acute hypoxic respiratory failure.  Parties in attendance: :Olive MckeonCastro MD, Family members: Elva (daughter).  Decisional Capacity:Yes    Subjective/Patient Story: Patient/family understand(s) that  her function continues to deteriorate.      Patient/family no longer wishes further curative interventions, including hospitalization, if there is no long term benefit :Yes    Objective/Medical Story: Recurrent hospitalizations for hypoxia prior history of COVID-19 infection with pulmonary fibrosis.  Now hospitalized with worsening hypoxia with diffuse bilateral infiltrates.  Poor response to antibiotics, steroids and Lasix.  Patient noted to have acute PE on anticoagulation.  Given patient's advanced age, it has now been agreed upon to pursue comfort care measures and hospice.    Goals of Care Determinations: Patient/family wishes to focus on comfort care.     Code Status: At this time patient wishes to be DNR-CC    Time Spent on Advanced Planning Discussion: 15 minutes      Electronically signed by Castro Lau MD on 3/20/2024 at 11:10 AM

## 2024-03-20 NOTE — PROGRESS NOTES
MD Jessica Mota MD Samir Brahmbhatt, MD               Office: [phone]                      Fax: [phone]             [facility]                   NEPHROLOGY INPATIENT PROGRESS NOTE:     PATIENT NAME: Olive Mckeon  : 1935   MRN: [id number]           RECOMMENDATIONS:     Management:- Refer to orders   - Monitor Serum Na      - Goal Serum Na mid 130s, by next /24 hours     -resumed Lasix-40 IV twice daily-continue  -stop Aldactone      - Fluid restriction 1.5L /24 hrs.,  - Minimize use any hypotonic/isotonic fluids such as D5W, NS, LR with other medications/drips  ,   - Concentrate continuous drip fluids as much as possible,   - Avoid IVF  , unless needed for resuscitation/hypovolemia w/ hypotension+tachycardia,    -Pulmonary currently following for hypoxia and abnormal CT chest  -They are working her up, including vasculitis panel    - minimize SSRI, NSAIDs use  - Strict I/O with daily weights.  - Monitor neurological status closely  --- Call us urgently if any/worsening neurological findings. ** **       Discharge plans from renal ctgyfrqgql-lvoovi-ju hypoxia per primary team  -: follow up w/ us at  [facility]  in 2 weeks after d/c. (I updated our information in discharge follow up providers' list.)           IMPRESSION:       Admitted on  3/5/2024  for:  Acute respiratory failure (HCC) [J96.00]  Acute respiratory failure with hypoxia (HCC) [J96.01]  Increased oxygen demand [R68.89]       Hyponatremia :    Was improving  Now worsening, 134-132-128 now    Other Renal labs are stable     Acute on Chronic,   Moderate - severe range,   mildly symptomatic,    -  No Reported Severe symptoms (such as seizures, obtundation, coma, or respiratory arrest).   - no need for Hypertonic saline or acute reversal     - Risk factors for hyponatremia: Likely hypovolemia,  diuresis,  - Patient is at  risk of developing Osmotic Demyelination Syndrome.

## 2024-03-20 NOTE — PROGRESS NOTES
CLINICAL PHARMACY NOTE: MEDS TO BEDS    Total # of Prescriptions Filled: 2   The following medications were delivered to the patient:  LORAZEPAM ORAL CON 2MG/ML  MORPHINE SULFATE (CONCENTR 100 SOLN    Additional Documentation: Patient RN picked up=signed  [facility]

## 2024-03-20 NOTE — PROGRESS NOTES
Shift assessment completed. Routine vitals obtained. Scheduled medications given. Patient is awake, alert and oriented. Respirations are easy and unlabored. Call light within reach.

## 2024-03-20 NOTE — ADT AUTH CERT
general medical examination at a health care facility       Seborrheic dermatitis, unspecified       Unspecified disorder of skin and subcutaneous tissue        Past Surgical History  Past Surgical History:  Procedure  Laterality  Date    CATARACT REMOVAL           left    CATARACT REMOVAL           right    COLONOSCOPY          OTHER SURGICAL HISTORY           anal fissure     Family History  Family History  Problem  Relation  Age of Onset    Hypertension  Sister       Diabetes  Sister       Stroke  Father  68    High Blood Pressure  Father       Heart Disease  Mother  80    Cancer  Paternal Aunt            breast     Social History       Socioeconomic History    Marital status:          Spouse name:  Not on file    Number of children:  Not on file    Years of education:  Not on file    Highest education level:  Not on file  Occupational History    Not on file  Tobacco Use    Smoking status:  Never    Smokeless tobacco:  Never  Substance and Sexual Activity    Alcohol use:  Not Currently        Comment: occasional    Drug use:  No    Sexual activity:  Not on file  Other Topics  Concern    Not on file  Social History Narrative    Not on file     Social Determinants of Health     Financial Resource Strain: Low Risk  (5/9/2023)     Overall Financial Resource Strain (CARDIA)       Difficulty of Paying Living Expenses: Not hard at all  Food Insecurity: No Food Insecurity (3/6/2024)     Hunger Vital Sign       Worried About Running Out of Food in the Last Year: Never true       Ran Out of Food in the Last Year: Never true  Transportation Needs: No Transportation Needs (3/6/2024)     PRAPARE - Transportation       Lack of Transportation (Medical): No       Lack of Transportation (Non-Medical): No  Physical Activity: Insufficiently Active (5/9/2023)     Exercise Vital Sign       Days of Exercise per Week: 2 days       Minutes of Exercise per Session: 30 min  Stress: Not on file  Social Connections: Not on    Lucero Jordan  26 Given 03/11/24 2100(s)  03/11/24 1959(a)  03/11/24 2004(r) 20 mg   Oral      Oneida Rodriguez  27 Given 03/06/24 0128(s)  03/06/24 0128(a)  03/06/24 0128(r) 1 Dose   Inhalation      JaneConchis schulte  28 Given 03/07/24 0905(s)  03/07/24 0905(a)  03/07/24 0908(r) 1 Dose   Inhalation      Regis Townsend  29 Given 03/09/24 0548(s)  03/09/24 0548(a)  03/09/24 0548(r) 1 Dose   Inhalation      Gretchen Truong  30 Given 03/06/24 0900(s)  03/06/24 0835(a)  03/06/24 0836(r) 100 mcg   Oral      Sharon Gomes  31 Given 03/07/24 0900(s)  03/07/24 0903(a)  03/07/24 0912(r) 100 mcg   Oral      Kayla Guadarrama  32 Given 03/08/24 0900(s)  03/08/24 0908(a)  03/08/24 0909(r) 100 mcg   Oral      Blane Pimentel  33 Given 03/09/24 0900(s)  03/09/24 1021(a)  03/09/24 1022(r) 100 mcg   Oral      Regis Nova  34 Given 03/10/24 0900(s)  03/10/24 0852(a)  03/10/24 0852(r) 100 mcg   Oral      Bushra Varela  35 Given 03/11/24 0900(s)  03/11/24 0815(a)  03/11/24 0815(r) 100 mcg   Oral      Mirna Chapman  36 Given 03/06/24 1100(s)  03/06/24 1107(a)  03/06/24 1107(r) 12.5 mg   Oral      Sharon Gomes  37 Given 03/06/24 2100(s)  03/06/24 2052(a)  03/06/24 2053(r) 12.5 mg   Oral      Rosalva Pñea  38 Given 03/07/24 0900(s)  03/07/24 0903(a)  03/07/24 0912(r) 12.5 mg   Oral      Kayla Guadarrama  39 Given 03/07/24 2100(s)  03/07/24 2148(a)  03/07/24 2148(r) 12.5 mg   Oral      Kayla Stokes  40 Given 03/08/24 0900(s)  03/08/24 0908(a)  03/08/24 0909(r) 12.5 mg   Oral      Blane Pimentel  41 Given 03/08/24 2100(s)  03/08/24 2119(a)  03/08/24 2119(r) 12.5 mg   Oral      Fisher, Natividad Seaman V  42 Given 03/09/24 0900(s)  03/09/24 1023(a)  03/09/24 1023(r) 12.5 mg   Oral      Regis Nova  43 Given 03/09/24 2100(s)  03/09/24 2107(a)  03/09/24 2107(r) 12.5 mg   Oral      FisherNatividad  44 Given 03/10/24 0900(s)  03/10/24 1027(a)  03/10/24 1027(r) 12.5 mg   Oral     okay to give per

## 2024-03-20 NOTE — CONSULTS
[facility] ENT       Otolaryngology Consultation      Requesting Physician / Provider:  Brittany Muñiz MD        Date of Consultation:  3/19/2024        Date of Admission:   3/05/2024      Admission diagnosis:  Acute respiratory failure (HCC) [J96.00]  Acute respiratory failure with hypoxia (HCC) [J96.01]  Increased oxygen demand [R68.89]      History Obtained From:  patient, family member - daughter, electronic medical record        IMPRESSION:   Bilateral otitis media with effusion, serous otitis media  Conductive hearing loss bilaterally.      RECOMMENDATIONS:   Discussed usual workup and evaluation and management, with hearing testing and ETD measures and possible PE tube.  She is entering hospice tomorrow so daughter/family are reluctant to pursue these steps.  They agreed to contact me if any pain develops or they decide to proceed with treatment for the OME.           CHIEF COMPLAINT / Reason for Consult:  \"right ear pain \"      HISTORY OF PRESENT ILLNESS:    Olive Mckeon is a 88 y.o. female is a new patient to me.  An otolaryngologic consultation was requested for evaluation of \"right ear pain.\"  Patient and daughter stated that she is having no pain in her ears.  She had sudden onset of hearing loss several days ago.  She had no loss of hearing prior to the current admission.        REVIEW OF SYSTEMS:    I reviewed the ROS in the admission history and physical.      Past Medical History:       Diagnosis Date    Adhesive capsulitis of shoulder     Hypothyroidism     On home O2     Osteoporosis, unspecified     Routine general medical examination at a health care facility     Seborrheic dermatitis, unspecified     Unspecified disorder of skin and subcutaneous tissue        Past Surgical History:        Procedure Laterality Date    CATARACT REMOVAL      left    CATARACT REMOVAL      right    COLONOSCOPY      OTHER SURGICAL HISTORY      anal fissure       Current Medications:

## 2024-03-20 NOTE — PROGRESS NOTES
Occupational Therapy  HOLD OT tx at this time secondary to DC plans this date to DC home with Hospice services.  Family present with pt now and RN requesting acute care OT hold at this time.  DC from acute care OT caseload. Thank you, NAS Martins, OTR/L, [phone].

## 2024-03-20 NOTE — PROGRESS NOTES
[facility] Pulmonary/CCM Progress note      Admit Date: 3/5/2024    Chief Complaint: Shortness of breath    Subjective:     Interval History: Unfortunately patient's respiratory status has declined once again as today-now requiring Airvo, has dyspnea at rest.  However patient denies any symptoms.  Patient's daughter at bedside.    Scheduled Meds:   furosemide  40 mg IntraVENous BID    digoxin  125 mcg Oral Daily    apixaban  5 mg Oral BID    metoprolol tartrate  12.5 mg Oral BID    sodium chloride flush  5-40 mL IntraVENous 2 times per day    aspirin  81 mg Oral Daily    donepezil  20 mg Oral Nightly    levothyroxine  100 mcg Oral Daily    Vitamin D  1,000 Units Oral Daily     Continuous Infusions:   sodium chloride 10 mL/hr at 03/06/24 0201     PRN Meds:sodium chloride, perflutren lipid microspheres, albuterol, sodium chloride flush, sodium chloride, potassium chloride **OR** potassium alternative oral replacement **OR** potassium chloride, magnesium sulfate, ondansetron **OR** ondansetron, polyethylene glycol, acetaminophen **OR** acetaminophen, docusate sodium, ipratropium 0.5 mg-albuterol 2.5 mg    Review of Systems  Constitutional: negative for fatigue, fevers, malaise and weight loss  Ears, nose, mouth, throat: negative for ear drainage, epistaxis, hoarseness, nasal congestion, sore throat and voice change  Respiratory: negative except for cough, shortness of breath, and sputum  Cardiovascular: negative for chest pain, chest pressure/discomfort, irregular heart beat, lower extremity edema and palpitations  Gastrointestinal: negative for abdominal pain, constipation, diarrhea, jaundice, melena, odynophagia, reflux symptoms and vomiting  Hematologic/lymphatic: negative for bleeding, easy bruising, lymphadenopathy and petechiae  Musculoskeletal:negative for arthralgias, bone pain, muscle weakness, neck pain and stiff joints  Neurological: negative for dizziness, gait problems, headaches, seizures, speech problems,  patient's daughter who was at bedside.  She has already met with palliative care team, has been agreeable for discharge home with hospice, given the continued clinical decline.  This is actually based on patient's own wishes.    Castro Lau MD

## 2024-03-20 NOTE — PROGRESS NOTES
[facility]    Met with patient and family to finalize patient's d/c plan with [facility]. Patient to discharge home after grandson visits her here in the hospital- he should be here by - [phone]. She will then transport home on 15 L non-rebreather mask and be transitioned to comfort measures at home so that she can pass in her home and bed as per her request.     Transportation set up via CMT @ 1600. Transportation packet completed and given to [facility].     Comfort meds ordered by hospitalist to outpatient pharmacy. This writer will call billing information and deliver to family prior to d/c.     Consent forms signed by patient's daughter due to cognitive incapacity- patient's mentation waxing/waning and she asked that her daughter sign.     Thank you for allowing [facility] to participate in Olive's care.     Oneida STEPHENS, RN  [facility]  C:[phone]  Main [facility]: [phone]    After 1900, weekends, holidays, and Thurdays: please call main [facility] number for assistance.

## 2024-03-21 ENCOUNTER — TELEPHONE (OUTPATIENT)
Dept: FAMILY MEDICINE CLINIC | Age: 89
End: 2024-03-21

## 2024-03-21 NOTE — DISCHARGE SUMMARY
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    Idiopathic progressive neuropathy    Venous insufficiency of both lower extremities    Acute respiratory failure with hypoxia (HCC)    Acute bronchitis    Pulmonary fibrosis (HCC)    History of COVID-19    Acute respiratory failure (HCC)    Increased oxygen demand    Elevated brain natriuretic peptide (BNP) level    Acute pulmonary embolism (HCC)    PAF (paroxysmal atrial fibrillation) (HCC)    Atrial fibrillation with RVR (HCC)    Acute diastolic congestive heart failure (HCC)    Acute pulmonary edema (HCC)    Post-COVID-19 syndrome    Severe malnutrition (HCC)       Admission Condition: poor    Discharged Condition: serious    Discharge Exam:  VITALS:  /62   Pulse 84   Temp 98 °F (36.7 °C) (Oral)   Resp 18   Ht 1.727 m (5' 8\")   Wt 63.2 kg (139 lb 5.3 oz)   SpO2 97%   BMI 21.19 kg/m²   CONSTITUTIONAL:  awake, alert, cooperative, no apparent distress, and appears stated age  LUNGS:  clear to auscultation bilaterally, No increased work of breathing, good air exchange, no crackles or wheezing  CARDIOVASCULAR:  Regular rate and rhythm, normal S1 and S2, no S3 or S4, and no significant murmurs, rubs or gallops noted.   ABDOMEN:  Normal active bowel sounds, soft, non-tender, non-distended, no masses palpated,  MUSCULOSKELETAL:  Full range of motion noted.     NEUROLOGIC:  Awake, alert, oriented to name, place and time.  Cranial nerves II-XII are grossly intact.    SKIN:  normal skin

## 2024-03-21 NOTE — TELEPHONE ENCOUNTER
Care Transitions Initial Follow Up Call    Outreach made within 2 business days of discharge: Yes    Patient: Olive Mckeon Patient : 1935   MRN: [id number]  Reason for Admission: There are no discharge diagnoses documented for the most recent discharge.  Discharge Date: 3/20/24       Spoke with: SAMANTHA    Discharge department/facility:         Scheduled appointment with PCP within 7-14 days    Follow Up  Future Appointments   Date Time Provider Department Center   3/27/2024  3:30 PM Randy Fuentes MD [facility]   2024  3:00 PM Blane Hale MD [facility] [place] [facility]   2024  2:00 PM Jg Mart, APRN - CNP [facility]       Keyana Bolivar LPN

## 2024-03-23 LAB
MYELOPEROXIDASE AB SER-ACNC: 0 AU/ML (ref 0–19)
PROTEINASE3 AB SER-ACNC: 0 AU/ML (ref 0–19)